# Patient Record
Sex: FEMALE | Race: BLACK OR AFRICAN AMERICAN | Employment: OTHER | URBAN - METROPOLITAN AREA
[De-identification: names, ages, dates, MRNs, and addresses within clinical notes are randomized per-mention and may not be internally consistent; named-entity substitution may affect disease eponyms.]

---

## 2021-04-22 ENCOUNTER — TRANSCRIBE ORDER (OUTPATIENT)
Dept: SCHEDULING | Age: 66
End: 2021-04-22

## 2021-04-22 DIAGNOSIS — Z12.31 ENCOUNTER FOR SCREENING MAMMOGRAM FOR MALIGNANT NEOPLASM OF BREAST: Primary | ICD-10-CM

## 2021-05-07 ENCOUNTER — HOSPITAL ENCOUNTER (OUTPATIENT)
Dept: MAMMOGRAPHY | Age: 66
Discharge: HOME | End: 2021-05-07
Attending: INTERNAL MEDICINE
Payer: COMMERCIAL

## 2021-05-07 DIAGNOSIS — Z12.31 ENCOUNTER FOR SCREENING MAMMOGRAM FOR MALIGNANT NEOPLASM OF BREAST: ICD-10-CM

## 2021-05-07 PROCEDURE — 77067 SCR MAMMO BI INCL CAD: CPT

## 2021-05-18 ENCOUNTER — TRANSCRIBE ORDER (OUTPATIENT)
Dept: SCHEDULING | Age: 66
End: 2021-05-18

## 2021-05-18 DIAGNOSIS — R92.8 ABNORMAL MAMMOGRAM: Primary | ICD-10-CM

## 2021-06-14 ENCOUNTER — HOSPITAL ENCOUNTER (OUTPATIENT)
Dept: MAMMOGRAPHY | Age: 66
Discharge: HOME | End: 2021-06-14
Attending: INTERNAL MEDICINE
Payer: COMMERCIAL

## 2021-06-14 ENCOUNTER — HOSPITAL ENCOUNTER (OUTPATIENT)
Dept: ULTRASOUND IMAGING | Age: 66
Discharge: HOME | End: 2021-06-14
Attending: INTERNAL MEDICINE
Payer: COMMERCIAL

## 2021-06-14 DIAGNOSIS — R92.8 ABNORMAL MAMMOGRAM: ICD-10-CM

## 2021-06-14 PROCEDURE — 76641 ULTRASOUND BREAST COMPLETE: CPT

## 2022-01-20 ENCOUNTER — HOSPITAL ENCOUNTER (EMERGENCY)
Age: 67
Discharge: HOME | End: 2022-01-20
Attending: EMERGENCY MEDICINE | Admitting: EMERGENCY MEDICINE
Payer: MEDICARE

## 2022-01-20 ENCOUNTER — APPOINTMENT (OUTPATIENT)
Dept: ULTRASOUND IMAGING | Age: 67
End: 2022-01-20
Attending: EMERGENCY MEDICINE
Payer: MEDICARE

## 2022-01-20 VITALS
SYSTOLIC BLOOD PRESSURE: 138 MMHG | HEIGHT: 66 IN | RESPIRATION RATE: 18 BRPM | WEIGHT: 200 LBS | BODY MASS INDEX: 32.14 KG/M2 | HEART RATE: 102 BPM | OXYGEN SATURATION: 98 % | DIASTOLIC BLOOD PRESSURE: 77 MMHG | TEMPERATURE: 98.2 F

## 2022-01-20 DIAGNOSIS — K80.50 BILIARY COLIC: Primary | ICD-10-CM

## 2022-01-20 LAB
ALBUMIN SERPL-MCNC: 3.9 G/DL (ref 3.5–5)
ALBUMIN/GLOB SERPL: 0.7 {RATIO} (ref 1.1–2.2)
ALP SERPL-CCNC: 81 U/L (ref 45–117)
ALT SERPL-CCNC: 18 U/L (ref 12–78)
ANION GAP SERPL CALC-SCNC: 12 MMOL/L (ref 5–15)
AST SERPL W P-5'-P-CCNC: 20 U/L (ref 15–37)
BASOPHILS # BLD: 0 K/UL (ref 0–0.2)
BASOPHILS NFR BLD: 0 % (ref 0–2.5)
BILIRUB SERPL-MCNC: 0.4 MG/DL (ref 0.2–1)
BUN SERPL-MCNC: 5 MG/DL (ref 6–20)
BUN/CREAT SERPL: 9 (ref 12–20)
CA-I BLD-MCNC: 8.8 MG/DL (ref 8.5–10.1)
CHLORIDE SERPL-SCNC: 101 MMOL/L (ref 97–108)
CO2 SERPL-SCNC: 27 MMOL/L (ref 21–32)
CREAT SERPL-MCNC: 0.57 MG/DL (ref 0.55–1.02)
EOSINOPHIL # BLD: 0 K/UL (ref 0–0.7)
EOSINOPHIL NFR BLD: 0 % (ref 0.9–2.9)
ERYTHROCYTE [DISTWIDTH] IN BLOOD BY AUTOMATED COUNT: 13.9 % (ref 11.5–14.5)
GLOBULIN SER CALC-MCNC: 5.3 G/DL (ref 2–4)
GLUCOSE SERPL-MCNC: 144 MG/DL (ref 65–100)
HCT VFR BLD AUTO: 37.8 % (ref 36–46)
HGB BLD-MCNC: 13.3 G/DL (ref 13.5–17.5)
LIPASE SERPL-CCNC: 28 U/L (ref 73–393)
LYMPHOCYTES # BLD: 0.4 K/UL (ref 1–4.8)
LYMPHOCYTES NFR BLD: 3 % (ref 20.5–51.1)
MCH RBC QN AUTO: 31.6 PG (ref 31–34)
MCHC RBC AUTO-ENTMCNC: 35.3 G/DL (ref 31–36)
MCV RBC AUTO: 89.6 FL (ref 80–100)
MONOCYTES # BLD: 0.8 K/UL (ref 0.2–2.4)
MONOCYTES NFR BLD: 7 % (ref 1.7–9.3)
NEUTS SEG # BLD: 10.7 K/UL (ref 1.8–7.7)
NEUTS SEG NFR BLD: 90 % (ref 42–75)
NRBC # BLD: 0 K/UL
NRBC BLD-RTO: 0 PER 100 WBC
PLATELET # BLD AUTO: 228 K/UL (ref 150–400)
PMV BLD AUTO: 8.4 FL (ref 6.5–11.5)
POTASSIUM SERPL-SCNC: 4 MMOL/L (ref 3.5–5.1)
PROT SERPL-MCNC: 9.2 G/DL (ref 6.4–8.2)
RBC # BLD AUTO: 4.22 M/UL (ref 4.5–5.9)
SODIUM SERPL-SCNC: 140 MMOL/L (ref 136–145)
WBC # BLD AUTO: 11.9 K/UL (ref 4.4–11.3)

## 2022-01-20 PROCEDURE — 80053 COMPREHEN METABOLIC PANEL: CPT

## 2022-01-20 PROCEDURE — 36415 COLL VENOUS BLD VENIPUNCTURE: CPT

## 2022-01-20 PROCEDURE — 99283 EMERGENCY DEPT VISIT LOW MDM: CPT

## 2022-01-20 PROCEDURE — 74011250637 HC RX REV CODE- 250/637: Performed by: EMERGENCY MEDICINE

## 2022-01-20 PROCEDURE — 96374 THER/PROPH/DIAG INJ IV PUSH: CPT

## 2022-01-20 PROCEDURE — 74011250636 HC RX REV CODE- 250/636: Performed by: EMERGENCY MEDICINE

## 2022-01-20 PROCEDURE — 76705 ECHO EXAM OF ABDOMEN: CPT

## 2022-01-20 PROCEDURE — 83690 ASSAY OF LIPASE: CPT

## 2022-01-20 PROCEDURE — 96375 TX/PRO/DX INJ NEW DRUG ADDON: CPT

## 2022-01-20 PROCEDURE — 96376 TX/PRO/DX INJ SAME DRUG ADON: CPT

## 2022-01-20 PROCEDURE — 85025 COMPLETE CBC W/AUTO DIFF WBC: CPT

## 2022-01-20 RX ORDER — MORPHINE SULFATE 4 MG/ML
4 INJECTION INTRAVENOUS
Status: COMPLETED | OUTPATIENT
Start: 2022-01-20 | End: 2022-01-20

## 2022-01-20 RX ORDER — OXYCODONE AND ACETAMINOPHEN 5; 325 MG/1; MG/1
1 TABLET ORAL
Qty: 12 TABLET | Refills: 0 | Status: SHIPPED | OUTPATIENT
Start: 2022-01-20 | End: 2022-01-23

## 2022-01-20 RX ORDER — ONDANSETRON 4 MG/1
4 TABLET, ORALLY DISINTEGRATING ORAL
Status: COMPLETED | OUTPATIENT
Start: 2022-01-20 | End: 2022-01-20

## 2022-01-20 RX ORDER — ONDANSETRON 2 MG/ML
4 INJECTION INTRAMUSCULAR; INTRAVENOUS
Status: COMPLETED | OUTPATIENT
Start: 2022-01-20 | End: 2022-01-20

## 2022-01-20 RX ADMIN — ONDANSETRON 4 MG: 2 INJECTION INTRAMUSCULAR; INTRAVENOUS at 16:00

## 2022-01-20 RX ADMIN — ONDANSETRON 4 MG: 4 TABLET, ORALLY DISINTEGRATING ORAL at 17:29

## 2022-01-20 RX ADMIN — MORPHINE SULFATE 4 MG: 4 INJECTION, SOLUTION INTRAMUSCULAR; INTRAVENOUS at 16:00

## 2022-01-20 RX ADMIN — SODIUM CHLORIDE 1000 ML: 9 INJECTION, SOLUTION INTRAVENOUS at 15:59

## 2022-01-20 RX ADMIN — MORPHINE SULFATE 4 MG: 4 INJECTION, SOLUTION INTRAMUSCULAR; INTRAVENOUS at 17:29

## 2022-01-20 NOTE — ED PROVIDER NOTES
HPI   Patient reports continuous and worsening right upper quadrant abdominal pain, nausea, and anorexia for the past 12 hours or so. Is described as a sharp and stabbing and is at times severe. She denies vomiting, GI bleeding,  complaints. Nothing has alleviated the pain and is worsened by palpation and certain positions. No past medical history on file. Past Surgical History:   Procedure Laterality Date    HX HYSTERECTOMY      HX OOPHORECTOMY           No family history on file. Social History     Socioeconomic History    Marital status:      Spouse name: Not on file    Number of children: Not on file    Years of education: Not on file    Highest education level: Not on file   Occupational History    Not on file   Tobacco Use    Smoking status: Not on file    Smokeless tobacco: Not on file   Substance and Sexual Activity    Alcohol use: Not on file    Drug use: Not on file    Sexual activity: Not on file   Other Topics Concern    Not on file   Social History Narrative    Not on file     Social Determinants of Health     Financial Resource Strain:     Difficulty of Paying Living Expenses: Not on file   Food Insecurity:     Worried About Running Out of Food in the Last Year: Not on file    David of Food in the Last Year: Not on file   Transportation Needs:     Lack of Transportation (Medical): Not on file    Lack of Transportation (Non-Medical):  Not on file   Physical Activity:     Days of Exercise per Week: Not on file    Minutes of Exercise per Session: Not on file   Stress:     Feeling of Stress : Not on file   Social Connections:     Frequency of Communication with Friends and Family: Not on file    Frequency of Social Gatherings with Friends and Family: Not on file    Attends Jain Services: Not on file    Active Member of Clubs or Organizations: Not on file    Attends Club or Organization Meetings: Not on file    Marital Status: Not on file   Intimate Partner Violence:     Fear of Current or Ex-Partner: Not on file    Emotionally Abused: Not on file    Physically Abused: Not on file    Sexually Abused: Not on file   Housing Stability:     Unable to Pay for Housing in the Last Year: Not on file    Number of [place] in the Last Year: Not on file    Unstable Housing in the Last Year: Not on file         ALLERGIES: Patient has no known allergies. Review of Systems   Constitutional: Negative. HENT: Negative. Eyes: Negative. Respiratory: Negative. Cardiovascular: Negative. Gastrointestinal: Positive for abdominal pain and nausea. Endocrine: Negative. Genitourinary: Negative. Musculoskeletal: Negative. Allergic/Immunologic: Negative. Neurological: Negative. Hematological: Negative. Psychiatric/Behavioral: Negative. All other systems reviewed and are negative. Vitals:    01/20/22 1410   BP: 138/77   Pulse: (!) 102   Resp: 18   Temp: 98.2 °F (36.8 °C)   SpO2: 98%   Weight: 90.7 kg (200 lb)   Height: 5' 6\" (1.676 m)            Physical Exam  Vitals and nursing note reviewed. Constitutional:       General: She is not in acute distress. Appearance: Normal appearance. She is ill-appearing. She is not toxic-appearing or diaphoretic. Comments: Very uncomfortable appearing   HENT:      Head: Normocephalic and atraumatic. Nose: Nose normal.      Mouth/Throat:      Mouth: Mucous membranes are moist.      Pharynx: Oropharynx is clear. Eyes:      Extraocular Movements: Extraocular movements intact. Conjunctiva/sclera: Conjunctivae normal.      Pupils: Pupils are equal, round, and reactive to light. Cardiovascular:      Rate and Rhythm: Normal rate and regular rhythm. Pulses: Normal pulses. Heart sounds: Normal heart sounds. No murmur heard. No friction rub. No gallop. Pulmonary:      Effort: Pulmonary effort is normal. No respiratory distress. Abdominal:      General: Abdomen is flat. There is no distension. Palpations: There is no mass. Tenderness: There is abdominal tenderness. There is guarding. There is no right CVA tenderness, left CVA tenderness or rebound. Hernia: No hernia is present. Comments: Firm and very tender in the right upper quadrant with guarding. Positive Richard sign. Musculoskeletal:         General: No swelling, tenderness, deformity or signs of injury. Normal range of motion. Cervical back: Normal range of motion. No rigidity. Right lower leg: No edema. Left lower leg: No edema. Lymphadenopathy:      Cervical: No cervical adenopathy. Skin:     General: Skin is warm and dry. Coloration: Skin is not jaundiced or pale. Findings: No bruising, erythema, lesion or rash. Neurological:      General: No focal deficit present. Mental Status: She is alert and oriented to person, place, and time. Mental status is at baseline. Cranial Nerves: No cranial nerve deficit. Sensory: No sensory deficit. Motor: No weakness. Coordination: Coordination normal.      Gait: Gait normal.   Psychiatric:         Mood and Affect: Mood normal.         Behavior: Behavior normal.          MDM     Is concerning for acute cholecystitis. Differential includes cholangitis, pancreatitis, common bile duct stone, malignancy. Will check labs and ultrasound. Provide pain relief and IV fluids. Admit versus transfer. Evaluation reveals likely acute cholecystitis. Reexamined patient remains very tender with guarding. Discussed with surgeon Dr. Cathie Merchant, who recommends transfer to [facility] and will see her there. Addendum: Patient reports that she wishes to follow-up outpatient with surgery. Given that the ultrasound was not definitive, patient is afebrile without much leukocytosis this is very reasonable.   Procedures

## 2022-04-29 ENCOUNTER — TRANSCRIBE ORDER (OUTPATIENT)
Dept: SCHEDULING | Age: 67
End: 2022-04-29

## 2022-04-29 DIAGNOSIS — Z12.31 SCREENING MAMMOGRAM FOR HIGH-RISK PATIENT: Primary | ICD-10-CM

## 2022-05-10 ENCOUNTER — HOSPITAL ENCOUNTER (OUTPATIENT)
Dept: MAMMOGRAPHY | Age: 67
Discharge: HOME | End: 2022-05-10
Attending: INTERNAL MEDICINE
Payer: MEDICARE

## 2022-05-10 DIAGNOSIS — Z12.31 SCREENING MAMMOGRAM FOR HIGH-RISK PATIENT: ICD-10-CM

## 2022-05-10 PROCEDURE — 77063 BREAST TOMOSYNTHESIS BI: CPT

## 2022-07-19 ENCOUNTER — HOSPITAL ENCOUNTER (OUTPATIENT)
Dept: RADIATION THERAPY | Age: 67
Discharge: HOME | End: 2022-07-19

## 2022-07-20 ENCOUNTER — HOSPITAL ENCOUNTER (OUTPATIENT)
Dept: RADIATION THERAPY | Age: 67
Discharge: HOME | End: 2022-07-20
Attending: INTERNAL MEDICINE
Payer: MEDICARE

## 2022-07-20 ENCOUNTER — HOSPITAL ENCOUNTER (OUTPATIENT)
Dept: INTERVENTIONAL RADIOLOGY/VASCULAR | Age: 67
Discharge: HOME | End: 2022-07-20
Attending: INTERNAL MEDICINE
Payer: MEDICARE

## 2022-07-20 VITALS
RESPIRATION RATE: 18 BRPM | SYSTOLIC BLOOD PRESSURE: 129 MMHG | HEIGHT: 66 IN | TEMPERATURE: 97.9 F | BODY MASS INDEX: 30.7 KG/M2 | OXYGEN SATURATION: 100 % | DIASTOLIC BLOOD PRESSURE: 99 MMHG | HEART RATE: 70 BPM | WEIGHT: 191 LBS

## 2022-07-20 DIAGNOSIS — C15.9 ESOPHAGEAL CANCER (HCC): ICD-10-CM

## 2022-07-20 DIAGNOSIS — K80.20 BILIARY CALCULUS: ICD-10-CM

## 2022-07-20 LAB
ANION GAP SERPL CALC-SCNC: 7 MMOL/L (ref 5–15)
BASOPHILS # BLD: 0 K/UL (ref 0–0.1)
BASOPHILS NFR BLD: 0 % (ref 0–1)
BUN SERPL-MCNC: 12 MG/DL (ref 6–20)
BUN/CREAT SERPL: 27 (ref 12–20)
CA-I BLD-MCNC: 8.9 MG/DL (ref 8.5–10.1)
CHLORIDE SERPL-SCNC: 108 MMOL/L (ref 97–108)
CO2 SERPL-SCNC: 24 MMOL/L (ref 21–32)
CREAT SERPL-MCNC: 0.45 MG/DL (ref 0.55–1.02)
DIFFERENTIAL METHOD BLD: ABNORMAL
EOSINOPHIL # BLD: 0 K/UL (ref 0–0.4)
EOSINOPHIL NFR BLD: 1 % (ref 0–7)
ERYTHROCYTE [DISTWIDTH] IN BLOOD BY AUTOMATED COUNT: 13.7 % (ref 11.5–14.5)
GLUCOSE SERPL-MCNC: 95 MG/DL (ref 65–100)
HCT VFR BLD AUTO: 30.1 % (ref 35–47)
HGB BLD-MCNC: 10.1 G/DL (ref 11.5–16)
IMM GRANULOCYTES # BLD AUTO: 0 K/UL (ref 0–0.04)
IMM GRANULOCYTES NFR BLD AUTO: 0 % (ref 0–0.5)
INR PPP: 1 (ref 0.9–1.1)
LYMPHOCYTES # BLD: 1.7 K/UL (ref 0.8–3.5)
LYMPHOCYTES NFR BLD: 38 % (ref 12–49)
MCH RBC QN AUTO: 30.5 PG (ref 26–34)
MCHC RBC AUTO-ENTMCNC: 33.6 G/DL (ref 30–36.5)
MCV RBC AUTO: 90.9 FL (ref 80–99)
MONOCYTES # BLD: 0.5 K/UL (ref 0–1)
MONOCYTES NFR BLD: 11 % (ref 5–13)
NEUTS SEG # BLD: 2.2 K/UL (ref 1.8–8)
NEUTS SEG NFR BLD: 50 % (ref 32–75)
NRBC # BLD: 0 K/UL (ref 0–0.01)
NRBC BLD-RTO: 0 PER 100 WBC
PLATELET # BLD AUTO: 235 K/UL (ref 150–400)
PMV BLD AUTO: 10.2 FL (ref 8.9–12.9)
POTASSIUM SERPL-SCNC: 3.7 MMOL/L (ref 3.5–5.1)
PROTHROMBIN TIME: 12.9 SEC (ref 11.9–14.6)
RBC # BLD AUTO: 3.31 M/UL (ref 3.8–5.2)
SODIUM SERPL-SCNC: 139 MMOL/L (ref 136–145)
WBC # BLD AUTO: 4.4 K/UL (ref 3.6–11)

## 2022-07-20 PROCEDURE — 99152 MOD SED SAME PHYS/QHP 5/>YRS: CPT

## 2022-07-20 PROCEDURE — 36415 COLL VENOUS BLD VENIPUNCTURE: CPT

## 2022-07-20 PROCEDURE — 85025 COMPLETE CBC W/AUTO DIFF WBC: CPT

## 2022-07-20 PROCEDURE — 76937 US GUIDE VASCULAR ACCESS: CPT

## 2022-07-20 PROCEDURE — C1788 PORT, INDWELLING, IMP: HCPCS

## 2022-07-20 PROCEDURE — 77334 RADIATION TREATMENT AID(S): CPT

## 2022-07-20 PROCEDURE — 80048 BASIC METABOLIC PNL TOTAL CA: CPT

## 2022-07-20 PROCEDURE — 77001 FLUOROGUIDE FOR VEIN DEVICE: CPT

## 2022-07-20 PROCEDURE — 99153 MOD SED SAME PHYS/QHP EA: CPT

## 2022-07-20 PROCEDURE — 77290 THER RAD SIMULAJ FIELD CPLX: CPT

## 2022-07-20 PROCEDURE — 74011250636 HC RX REV CODE- 250/636: Performed by: STUDENT IN AN ORGANIZED HEALTH CARE EDUCATION/TRAINING PROGRAM

## 2022-07-20 PROCEDURE — 85610 PROTHROMBIN TIME: CPT

## 2022-07-20 PROCEDURE — 36561 INSERT TUNNELED CV CATH: CPT

## 2022-07-20 RX ORDER — LISINOPRIL 5 MG/1
10 TABLET ORAL DAILY
Status: ON HOLD | COMMUNITY

## 2022-07-20 RX ORDER — MIDAZOLAM HYDROCHLORIDE 1 MG/ML
.5-1 INJECTION, SOLUTION INTRAMUSCULAR; INTRAVENOUS
Status: DISCONTINUED | OUTPATIENT
Start: 2022-07-20 | End: 2022-07-29 | Stop reason: HOSPADM

## 2022-07-20 RX ORDER — FENTANYL CITRATE 50 UG/ML
12.5-5 INJECTION, SOLUTION INTRAMUSCULAR; INTRAVENOUS
Status: DISCONTINUED | OUTPATIENT
Start: 2022-07-20 | End: 2022-07-29 | Stop reason: HOSPADM

## 2022-07-20 RX ORDER — CEFAZOLIN SODIUM 1 G/3ML
2 INJECTION, POWDER, FOR SOLUTION INTRAMUSCULAR; INTRAVENOUS ONCE
Status: COMPLETED | OUTPATIENT
Start: 2022-07-20 | End: 2022-07-20

## 2022-07-20 RX ORDER — ATORVASTATIN CALCIUM 20 MG/1
TABLET, FILM COATED ORAL DAILY
Status: ON HOLD | COMMUNITY

## 2022-07-20 RX ORDER — AMLODIPINE BESYLATE 5 MG/1
5 TABLET ORAL DAILY
Status: ON HOLD | COMMUNITY

## 2022-07-20 RX ADMIN — FENTANYL CITRATE 50 MCG: 0.05 INJECTION, SOLUTION INTRAMUSCULAR; INTRAVENOUS at 09:26

## 2022-07-20 RX ADMIN — MIDAZOLAM 1 MG: 1 INJECTION INTRAMUSCULAR; INTRAVENOUS at 09:34

## 2022-07-20 RX ADMIN — MIDAZOLAM 1 MG: 1 INJECTION INTRAMUSCULAR; INTRAVENOUS at 09:26

## 2022-07-20 RX ADMIN — CEFAZOLIN SODIUM 2 G: 1 INJECTION, POWDER, FOR SOLUTION INTRAMUSCULAR; INTRAVENOUS at 09:20

## 2022-07-20 RX ADMIN — FENTANYL CITRATE 50 MCG: 0.05 INJECTION, SOLUTION INTRAMUSCULAR; INTRAVENOUS at 09:34

## 2022-07-20 NOTE — H&P
Radiology History and Physical    Patient: Toya Betts 73 y.o. female       Chief Complaint: No chief complaint on file. History of Present Illness: 73year old woman presents for port placement, for chemotherapy access. History:    Past Medical History:   Diagnosis Date    Cancer ([facility] [address]      Family History   Problem Relation Age of Onset    Breast Cancer Mother      Social History     Socioeconomic History    Marital status:      Spouse name: Not on file    Number of children: Not on file    Years of education: Not on file    Highest education level: Not on file   Occupational History    Not on file   Tobacco Use    Smoking status: Never    Smokeless tobacco: Never   Vaping Use    Vaping Use: Never used   Substance and Sexual Activity    Alcohol use: Not Currently    Drug use: Never    Sexual activity: Not on file   Other Topics Concern    Not on file   Social History Narrative    Not on file     Social Determinants of Health     Financial Resource Strain: Not on file   Food Insecurity: Not on file   Transportation Needs: Not on file   Physical Activity: Not on file   Stress: Not on file   Social Connections: Not on file   Intimate Partner Violence: Not on file   Housing Stability: Not on file       Allergies: No Known Allergies    Current Medications:  Current Outpatient Medications   Medication Sig    lisinopriL (PRINIVIL, ZESTRIL) 5 mg tablet Take 10 mg by mouth in the morning. amLODIPine (NORVASC) 5 mg tablet Take 5 mg by mouth in the morning. atorvastatin (LIPITOR) 20 mg tablet Take  by mouth daily. Current Facility-Administered Medications   Medication Dose Route Frequency    fentaNYL citrate (PF) injection 12.5-50 mcg  12.5-50 mcg IntraVENous Multiple    midazolam (VERSED) injection 0.5-1 mg  0.5-1 mg IntraVENous Multiple    ceFAZolin (ANCEF) injection 2 g  2 g IntraVENous ONCE        Physical Exam:  Blood pressure 128/70, pulse 64, temperature 98.2 °F (36.8 °C), resp.  rate 18, height 5' 6\" (1.676 m), weight 86.6 kg (191 lb), SpO2 100 %. GENERAL: alert, cooperative, no distress, appears stated age,   LUNG: Nonlabored respiration on room air  HEART: regular rate and rhythm    Alerts:      Laboratory:      Recent Labs     07/20/22  0819   HGB 10.1*   HCT 30.1*   WBC 4.4      INR 1.0   BUN 12   CREA 0.45*   K 3.7         Plan of Care/Planned Procedure:  Risks, benefits, and alternatives reviewed with patient and she agrees to proceed with the procedure. Port placement    Deemed appropriate for moderate sedation with versed and fentanyl.     Ingrid Washington MD  Interventional Radiology  [facility]  9:28 AM, 7/20/2022

## 2022-07-20 NOTE — PROCEDURES
Interventional Radiology  Procedure Note        7/20/2022 9:47 AM    Patient: John Wells     Informed consent obtained    Diagnosis: Cancer, need for IV access    Procedure(s): Port placement    Specimens removed:  none    Complications: None    Primary Physician: Cyrus Rudolph MD    Recommendations: N/A    Discharge Disposition: Stable; discharge to home    Full dictated report to follow    Cyrus Rudolph MD  Interventional Radiology  [facility].  9:47 AM, 7/20/2022

## 2022-07-20 NOTE — PROGRESS NOTES
Upon arrival to sds unit, Patient states okay to review and give discharge instructions to  Anup Florence/ daughter.

## 2022-07-20 NOTE — PROGRESS NOTES
Patient's two port incision sites are clean, dry and intact with skin glue.  Assessed at 1001am, and no new finding at reassessment at 1030 am.

## 2022-07-20 NOTE — PROGRESS NOTES
Patient and Daughter, Maryam Matos, given discharge education verballly and gave back verbal understanding. Maryam Matos, daughter signed discharge page. Patient taken to ride's car via wheelchair.

## 2022-07-22 ENCOUNTER — HOSPITAL ENCOUNTER (OUTPATIENT)
Dept: RADIATION THERAPY | Age: 67
Discharge: HOME | End: 2022-07-22
Payer: MEDICARE

## 2022-07-22 PROCEDURE — 77338 DESIGN MLC DEVICE FOR IMRT: CPT

## 2022-07-22 PROCEDURE — 77300 RADIATION THERAPY DOSE PLAN: CPT

## 2022-07-22 PROCEDURE — 77301 RADIOTHERAPY DOSE PLAN IMRT: CPT

## 2022-07-22 PROCEDURE — 77470 SPECIAL RADIATION TREATMENT: CPT

## 2022-07-28 ENCOUNTER — HOSPITAL ENCOUNTER (OUTPATIENT)
Dept: RADIATION THERAPY | Age: 67
Discharge: HOME | End: 2022-07-28
Payer: MEDICARE

## 2022-07-28 PROCEDURE — 77386 HC IMRT TRMT DLVR COMPL: CPT

## 2022-07-29 ENCOUNTER — HOSPITAL ENCOUNTER (OUTPATIENT)
Dept: RADIATION THERAPY | Age: 67
Discharge: HOME | End: 2022-07-29
Payer: MEDICARE

## 2022-07-29 PROCEDURE — 77386 HC IMRT TRMT DLVR COMPL: CPT

## 2022-08-01 ENCOUNTER — HOSPITAL ENCOUNTER (OUTPATIENT)
Dept: RADIATION THERAPY | Age: 67
Discharge: HOME | End: 2022-08-01
Payer: MEDICARE

## 2022-08-01 PROCEDURE — 77386 HC IMRT TRMT DLVR COMPL: CPT

## 2022-08-02 ENCOUNTER — HOSPITAL ENCOUNTER (OUTPATIENT)
Dept: RADIATION THERAPY | Age: 67
Discharge: HOME | End: 2022-08-02
Payer: MEDICARE

## 2022-08-02 PROCEDURE — 77386 HC IMRT TRMT DLVR COMPL: CPT

## 2022-08-03 ENCOUNTER — HOSPITAL ENCOUNTER (OUTPATIENT)
Dept: RADIATION THERAPY | Age: 67
Discharge: HOME | End: 2022-08-03
Payer: MEDICARE

## 2022-08-03 PROCEDURE — 77386 HC IMRT TRMT DLVR COMPL: CPT

## 2022-08-03 PROCEDURE — 77336 RADIATION PHYSICS CONSULT: CPT

## 2022-08-04 ENCOUNTER — HOSPITAL ENCOUNTER (OUTPATIENT)
Dept: RADIATION THERAPY | Age: 67
Discharge: HOME | End: 2022-08-04
Payer: MEDICARE

## 2022-08-04 PROCEDURE — 77386 HC IMRT TRMT DLVR COMPL: CPT

## 2022-08-05 ENCOUNTER — HOSPITAL ENCOUNTER (OUTPATIENT)
Dept: RADIATION THERAPY | Age: 67
Discharge: HOME | End: 2022-08-05
Payer: MEDICARE

## 2022-08-05 PROCEDURE — 77386 HC IMRT TRMT DLVR COMPL: CPT

## 2022-08-08 ENCOUNTER — HOSPITAL ENCOUNTER (OUTPATIENT)
Dept: RADIATION THERAPY | Age: 67
Discharge: HOME | End: 2022-08-08
Payer: MEDICARE

## 2022-08-08 PROCEDURE — 77386 HC IMRT TRMT DLVR COMPL: CPT

## 2022-08-09 ENCOUNTER — HOSPITAL ENCOUNTER (OUTPATIENT)
Dept: RADIATION THERAPY | Age: 67
Discharge: HOME | End: 2022-08-09
Payer: MEDICARE

## 2022-08-09 PROCEDURE — 77386 HC IMRT TRMT DLVR COMPL: CPT

## 2022-08-10 ENCOUNTER — HOSPITAL ENCOUNTER (OUTPATIENT)
Dept: RADIATION THERAPY | Age: 67
Discharge: HOME | End: 2022-08-10
Payer: MEDICARE

## 2022-08-10 PROCEDURE — 77336 RADIATION PHYSICS CONSULT: CPT

## 2022-08-10 PROCEDURE — 77386 HC IMRT TRMT DLVR COMPL: CPT

## 2022-08-11 ENCOUNTER — HOSPITAL ENCOUNTER (OUTPATIENT)
Dept: RADIATION THERAPY | Age: 67
Discharge: HOME | End: 2022-08-11
Payer: MEDICARE

## 2022-08-11 PROCEDURE — 77386 HC IMRT TRMT DLVR COMPL: CPT

## 2022-08-12 ENCOUNTER — HOSPITAL ENCOUNTER (OUTPATIENT)
Dept: RADIATION THERAPY | Age: 67
Discharge: HOME | End: 2022-08-12
Payer: MEDICARE

## 2022-08-12 PROCEDURE — 77386 HC IMRT TRMT DLVR COMPL: CPT

## 2022-08-15 ENCOUNTER — HOSPITAL ENCOUNTER (OUTPATIENT)
Dept: RADIATION THERAPY | Age: 67
Discharge: HOME | End: 2022-08-15
Payer: MEDICARE

## 2022-08-15 PROCEDURE — 77386 HC IMRT TRMT DLVR COMPL: CPT

## 2022-08-16 ENCOUNTER — HOSPITAL ENCOUNTER (OUTPATIENT)
Dept: RADIATION THERAPY | Age: 67
Discharge: HOME | End: 2022-08-16
Payer: MEDICARE

## 2022-08-16 PROCEDURE — 77386 HC IMRT TRMT DLVR COMPL: CPT

## 2022-08-17 ENCOUNTER — HOSPITAL ENCOUNTER (OUTPATIENT)
Dept: RADIATION THERAPY | Age: 67
Discharge: HOME | End: 2022-08-17
Payer: MEDICARE

## 2022-08-17 PROCEDURE — 77336 RADIATION PHYSICS CONSULT: CPT

## 2022-08-17 PROCEDURE — 77386 HC IMRT TRMT DLVR COMPL: CPT

## 2022-08-18 ENCOUNTER — HOSPITAL ENCOUNTER (OUTPATIENT)
Dept: RADIATION THERAPY | Age: 67
Discharge: HOME | End: 2022-08-18
Payer: MEDICARE

## 2022-08-18 PROCEDURE — 77386 HC IMRT TRMT DLVR COMPL: CPT

## 2022-08-19 ENCOUNTER — HOSPITAL ENCOUNTER (OUTPATIENT)
Dept: RADIATION THERAPY | Age: 67
Discharge: HOME | End: 2022-08-19
Payer: MEDICARE

## 2022-08-19 PROCEDURE — 77386 HC IMRT TRMT DLVR COMPL: CPT

## 2022-08-22 ENCOUNTER — HOSPITAL ENCOUNTER (OUTPATIENT)
Dept: RADIATION THERAPY | Age: 67
Discharge: HOME | End: 2022-08-22
Payer: MEDICARE

## 2022-08-22 PROCEDURE — 77386 HC IMRT TRMT DLVR COMPL: CPT

## 2022-08-23 ENCOUNTER — HOSPITAL ENCOUNTER (OUTPATIENT)
Dept: RADIATION THERAPY | Age: 67
Discharge: HOME | End: 2022-08-23
Payer: MEDICARE

## 2022-08-23 PROCEDURE — 77386 HC IMRT TRMT DLVR COMPL: CPT

## 2022-08-24 ENCOUNTER — HOSPITAL ENCOUNTER (OUTPATIENT)
Dept: RADIATION THERAPY | Age: 67
Discharge: HOME | End: 2022-08-24
Payer: MEDICARE

## 2022-08-24 PROCEDURE — 77336 RADIATION PHYSICS CONSULT: CPT

## 2022-08-24 PROCEDURE — 77386 HC IMRT TRMT DLVR COMPL: CPT

## 2022-08-25 ENCOUNTER — HOSPITAL ENCOUNTER (OUTPATIENT)
Dept: RADIATION THERAPY | Age: 67
Discharge: HOME | End: 2022-08-25
Payer: MEDICARE

## 2022-08-25 PROCEDURE — 77386 HC IMRT TRMT DLVR COMPL: CPT

## 2022-08-26 ENCOUNTER — HOSPITAL ENCOUNTER (OUTPATIENT)
Dept: RADIATION THERAPY | Age: 67
Discharge: HOME | End: 2022-08-26
Payer: MEDICARE

## 2022-08-26 PROCEDURE — 77386 HC IMRT TRMT DLVR COMPL: CPT

## 2022-08-29 ENCOUNTER — HOSPITAL ENCOUNTER (OUTPATIENT)
Dept: RADIATION THERAPY | Age: 67
Discharge: HOME | End: 2022-08-29
Payer: MEDICARE

## 2022-08-30 ENCOUNTER — HOSPITAL ENCOUNTER (OUTPATIENT)
Dept: RADIATION THERAPY | Age: 67
Discharge: HOME | End: 2022-08-30
Payer: MEDICARE

## 2022-08-30 PROCEDURE — 77338 DESIGN MLC DEVICE FOR IMRT: CPT

## 2022-08-30 PROCEDURE — 77300 RADIATION THERAPY DOSE PLAN: CPT

## 2022-09-02 ENCOUNTER — HOSPITAL ENCOUNTER (OUTPATIENT)
Dept: RADIATION THERAPY | Age: 67
Discharge: HOME | End: 2022-09-02
Payer: MEDICARE

## 2022-09-06 ENCOUNTER — HOSPITAL ENCOUNTER (OUTPATIENT)
Dept: RADIATION THERAPY | Age: 67
Discharge: HOME | End: 2022-09-06
Payer: MEDICARE

## 2022-09-06 PROCEDURE — 77386 HC IMRT TRMT DLVR COMPL: CPT

## 2022-09-07 ENCOUNTER — HOSPITAL ENCOUNTER (OUTPATIENT)
Dept: RADIATION THERAPY | Age: 67
Discharge: HOME | End: 2022-09-07
Payer: MEDICARE

## 2022-09-07 PROCEDURE — 77386 HC IMRT TRMT DLVR COMPL: CPT

## 2022-09-08 ENCOUNTER — HOSPITAL ENCOUNTER (OUTPATIENT)
Dept: RADIATION THERAPY | Age: 67
Discharge: HOME | End: 2022-09-08
Payer: MEDICARE

## 2022-09-08 PROCEDURE — 77386 HC IMRT TRMT DLVR COMPL: CPT

## 2022-09-08 PROCEDURE — 77336 RADIATION PHYSICS CONSULT: CPT

## 2022-09-09 ENCOUNTER — HOSPITAL ENCOUNTER (OUTPATIENT)
Dept: RADIATION THERAPY | Age: 67
Discharge: HOME | End: 2022-09-09
Payer: MEDICARE

## 2022-09-09 PROCEDURE — 77386 HC IMRT TRMT DLVR COMPL: CPT

## 2022-09-12 ENCOUNTER — HOSPITAL ENCOUNTER (OUTPATIENT)
Dept: RADIATION THERAPY | Age: 67
Discharge: HOME | End: 2022-09-12
Payer: MEDICARE

## 2022-09-12 PROCEDURE — 77386 HC IMRT TRMT DLVR COMPL: CPT

## 2022-09-13 ENCOUNTER — HOSPITAL ENCOUNTER (OUTPATIENT)
Dept: RADIATION THERAPY | Age: 67
Discharge: HOME | End: 2022-09-13
Payer: MEDICARE

## 2022-09-13 PROCEDURE — 77386 HC IMRT TRMT DLVR COMPL: CPT

## 2022-09-14 ENCOUNTER — HOSPITAL ENCOUNTER (OUTPATIENT)
Dept: RADIATION THERAPY | Age: 67
Discharge: HOME | End: 2022-09-14
Payer: MEDICARE

## 2022-09-14 PROCEDURE — 77386 HC IMRT TRMT DLVR COMPL: CPT

## 2022-09-15 ENCOUNTER — HOSPITAL ENCOUNTER (OUTPATIENT)
Dept: RADIATION THERAPY | Age: 67
Discharge: HOME | End: 2022-09-15
Payer: MEDICARE

## 2022-09-15 PROCEDURE — 77386 HC IMRT TRMT DLVR COMPL: CPT

## 2022-09-15 PROCEDURE — 77336 RADIATION PHYSICS CONSULT: CPT

## 2022-09-16 ENCOUNTER — APPOINTMENT (OUTPATIENT)
Dept: RADIATION THERAPY | Age: 67
End: 2022-09-16
Payer: MEDICARE

## 2022-10-20 ENCOUNTER — HOSPITAL ENCOUNTER (OUTPATIENT)
Dept: RADIATION THERAPY | Age: 67
Discharge: HOME | End: 2022-10-20

## 2022-11-03 ENCOUNTER — APPOINTMENT (OUTPATIENT)
Dept: GENERAL RADIOLOGY | Age: 67
DRG: 375 | End: 2022-11-03
Attending: EMERGENCY MEDICINE
Payer: MEDICARE

## 2022-11-03 ENCOUNTER — HOSPITAL ENCOUNTER (INPATIENT)
Age: 67
LOS: 8 days | Discharge: HOME | DRG: 375 | End: 2022-11-11
Attending: EMERGENCY MEDICINE | Admitting: INTERNAL MEDICINE
Payer: MEDICARE

## 2022-11-03 DIAGNOSIS — E87.20 METABOLIC ACIDOSIS: ICD-10-CM

## 2022-11-03 DIAGNOSIS — E87.6 HYPOKALEMIA: Primary | ICD-10-CM

## 2022-11-03 LAB
ALBUMIN SERPL-MCNC: 3.6 G/DL (ref 3.5–5)
ALBUMIN/GLOB SERPL: 0.8 {RATIO} (ref 1.1–2.2)
ALP SERPL-CCNC: 99 U/L (ref 45–117)
ALT SERPL-CCNC: 10 U/L (ref 12–78)
ANION GAP SERPL CALC-SCNC: 12 MMOL/L (ref 5–15)
ANION GAP SERPL CALC-SCNC: 18 MMOL/L (ref 5–15)
APAP SERPL-MCNC: <10 UG/ML (ref 10–30)
AST SERPL W P-5'-P-CCNC: 16 U/L (ref 15–37)
ATRIAL RATE: 96 BPM
BASOPHILS # BLD: 0 K/UL (ref 0–0.1)
BASOPHILS NFR BLD: 0 % (ref 0–1)
BILIRUB SERPL-MCNC: 0.4 MG/DL (ref 0.2–1)
BUN SERPL-MCNC: 5 MG/DL (ref 6–20)
BUN SERPL-MCNC: 5 MG/DL (ref 6–20)
BUN/CREAT SERPL: 4 (ref 12–20)
BUN/CREAT SERPL: 6 (ref 12–20)
CA-I BLD-MCNC: 8.9 MG/DL (ref 8.5–10.1)
CA-I BLD-MCNC: 9.9 MG/DL (ref 8.5–10.1)
CALCULATED P AXIS, ECG09: 71 DEGREES
CALCULATED R AXIS, ECG10: 45 DEGREES
CALCULATED T AXIS, ECG11: 49 DEGREES
CHLORIDE SERPL-SCNC: 115 MMOL/L (ref 97–108)
CHLORIDE SERPL-SCNC: 121 MMOL/L (ref 97–108)
CO2 SERPL-SCNC: 12 MMOL/L (ref 21–32)
CO2 SERPL-SCNC: 13 MMOL/L (ref 21–32)
CREAT SERPL-MCNC: 0.88 MG/DL (ref 0.55–1.02)
CREAT SERPL-MCNC: 1.35 MG/DL (ref 0.55–1.02)
DIAGNOSIS, 93000: NORMAL
DIFFERENTIAL METHOD BLD: ABNORMAL
EOSINOPHIL # BLD: 0 K/UL (ref 0–0.4)
EOSINOPHIL NFR BLD: 0 % (ref 0–7)
ERYTHROCYTE [DISTWIDTH] IN BLOOD BY AUTOMATED COUNT: 20 % (ref 11.5–14.5)
ETHANOL SERPL-MCNC: <10 MG/DL
GLOBULIN SER CALC-MCNC: 4.7 G/DL (ref 2–4)
GLUCOSE SERPL-MCNC: 126 MG/DL (ref 65–100)
GLUCOSE SERPL-MCNC: 155 MG/DL (ref 65–100)
HCT VFR BLD AUTO: 33 % (ref 35–47)
HGB BLD-MCNC: 11.8 G/DL (ref 11.5–16)
IMM GRANULOCYTES # BLD AUTO: 0 K/UL (ref 0–0.04)
IMM GRANULOCYTES NFR BLD AUTO: 0 % (ref 0–0.5)
LYMPHOCYTES # BLD: 1 K/UL (ref 0.8–3.5)
LYMPHOCYTES NFR BLD: 12 % (ref 12–49)
MAGNESIUM SERPL-MCNC: 1.9 MG/DL (ref 1.6–2.4)
MCH RBC QN AUTO: 32.9 PG (ref 26–34)
MCHC RBC AUTO-ENTMCNC: 35.8 G/DL (ref 30–36.5)
MCV RBC AUTO: 91.9 FL (ref 80–99)
MONOCYTES # BLD: 0.6 K/UL (ref 0–1)
MONOCYTES NFR BLD: 7 % (ref 5–13)
NEUTS SEG # BLD: 6.4 K/UL (ref 1.8–8)
NEUTS SEG NFR BLD: 81 % (ref 32–75)
NRBC # BLD: 0.02 K/UL (ref 0–0.01)
NRBC BLD-RTO: 0.3 PER 100 WBC
P-R INTERVAL, ECG05: 152 MS
PLATELET # BLD AUTO: 129 K/UL (ref 150–400)
PMV BLD AUTO: 11.1 FL (ref 8.9–12.9)
POTASSIUM SERPL-SCNC: 2 MMOL/L (ref 3.5–5.1)
POTASSIUM SERPL-SCNC: 2.5 MMOL/L (ref 3.5–5.1)
PROT SERPL-MCNC: 8.3 G/DL (ref 6.4–8.2)
Q-T INTERVAL, ECG07: 464 MS
QRS DURATION, ECG06: 86 MS
QTC CALCULATION (BEZET), ECG08: 586 MS
RBC # BLD AUTO: 3.59 M/UL (ref 3.8–5.2)
SALICYLATES SERPL-MCNC: <1.7 MG/DL (ref 2.8–20)
SODIUM SERPL-SCNC: 145 MMOL/L (ref 136–145)
SODIUM SERPL-SCNC: 146 MMOL/L (ref 136–145)
TROPONIN-HIGH SENSITIVITY: 37 NG/L (ref 0–51)
VENTRICULAR RATE, ECG03: 96 BPM
WBC # BLD AUTO: 8 K/UL (ref 3.6–11)

## 2022-11-03 PROCEDURE — 80143 DRUG ASSAY ACETAMINOPHEN: CPT

## 2022-11-03 PROCEDURE — 74011000250 HC RX REV CODE- 250: Performed by: INTERNAL MEDICINE

## 2022-11-03 PROCEDURE — 85025 COMPLETE CBC W/AUTO DIFF WBC: CPT

## 2022-11-03 PROCEDURE — 82077 ASSAY SPEC XCP UR&BREATH IA: CPT

## 2022-11-03 PROCEDURE — 74011250636 HC RX REV CODE- 250/636: Performed by: EMERGENCY MEDICINE

## 2022-11-03 PROCEDURE — 65270000029 HC RM PRIVATE

## 2022-11-03 PROCEDURE — 84484 ASSAY OF TROPONIN QUANT: CPT

## 2022-11-03 PROCEDURE — 71045 X-RAY EXAM CHEST 1 VIEW: CPT

## 2022-11-03 PROCEDURE — 96368 THER/DIAG CONCURRENT INF: CPT

## 2022-11-03 PROCEDURE — 94761 N-INVAS EAR/PLS OXIMETRY MLT: CPT

## 2022-11-03 PROCEDURE — 80179 DRUG ASSAY SALICYLATE: CPT

## 2022-11-03 PROCEDURE — 93005 ELECTROCARDIOGRAM TRACING: CPT

## 2022-11-03 PROCEDURE — 80048 BASIC METABOLIC PNL TOTAL CA: CPT

## 2022-11-03 PROCEDURE — 74011250636 HC RX REV CODE- 250/636: Performed by: INTERNAL MEDICINE

## 2022-11-03 PROCEDURE — 96366 THER/PROPH/DIAG IV INF ADDON: CPT

## 2022-11-03 PROCEDURE — 36415 COLL VENOUS BLD VENIPUNCTURE: CPT

## 2022-11-03 PROCEDURE — 96365 THER/PROPH/DIAG IV INF INIT: CPT

## 2022-11-03 PROCEDURE — 83735 ASSAY OF MAGNESIUM: CPT

## 2022-11-03 PROCEDURE — 74011250637 HC RX REV CODE- 250/637: Performed by: EMERGENCY MEDICINE

## 2022-11-03 PROCEDURE — 99285 EMERGENCY DEPT VISIT HI MDM: CPT

## 2022-11-03 PROCEDURE — 80053 COMPREHEN METABOLIC PANEL: CPT

## 2022-11-03 RX ORDER — ACETAMINOPHEN 325 MG/1
650 TABLET ORAL
Status: DISCONTINUED | OUTPATIENT
Start: 2022-11-03 | End: 2022-11-11 | Stop reason: HOSPADM

## 2022-11-03 RX ORDER — MAGNESIUM SULFATE 1 G/100ML
1 INJECTION INTRAVENOUS
Status: COMPLETED | OUTPATIENT
Start: 2022-11-03 | End: 2022-11-03

## 2022-11-03 RX ORDER — ATORVASTATIN CALCIUM 20 MG/1
20 TABLET, FILM COATED ORAL DAILY
Status: DISCONTINUED | OUTPATIENT
Start: 2022-11-04 | End: 2022-11-11 | Stop reason: HOSPADM

## 2022-11-03 RX ORDER — ACETAMINOPHEN 650 MG/1
650 SUPPOSITORY RECTAL
Status: DISCONTINUED | OUTPATIENT
Start: 2022-11-03 | End: 2022-11-11 | Stop reason: HOSPADM

## 2022-11-03 RX ORDER — ONDANSETRON 4 MG/1
4 TABLET, ORALLY DISINTEGRATING ORAL
Status: DISCONTINUED | OUTPATIENT
Start: 2022-11-03 | End: 2022-11-11 | Stop reason: HOSPADM

## 2022-11-03 RX ORDER — POTASSIUM CHLORIDE 750 MG/1
60 TABLET, FILM COATED, EXTENDED RELEASE ORAL
Status: COMPLETED | OUTPATIENT
Start: 2022-11-03 | End: 2022-11-03

## 2022-11-03 RX ORDER — LISINOPRIL 10 MG/1
10 TABLET ORAL DAILY
Status: DISCONTINUED | OUTPATIENT
Start: 2022-11-04 | End: 2022-11-11 | Stop reason: HOSPADM

## 2022-11-03 RX ORDER — SODIUM CHLORIDE 0.9 % (FLUSH) 0.9 %
5-40 SYRINGE (ML) INJECTION EVERY 8 HOURS
Status: DISCONTINUED | OUTPATIENT
Start: 2022-11-03 | End: 2022-11-11 | Stop reason: HOSPADM

## 2022-11-03 RX ORDER — SODIUM CHLORIDE 0.9 % (FLUSH) 0.9 %
5-40 SYRINGE (ML) INJECTION AS NEEDED
Status: DISCONTINUED | OUTPATIENT
Start: 2022-11-03 | End: 2022-11-11 | Stop reason: HOSPADM

## 2022-11-03 RX ORDER — DEXTROSE, SODIUM CHLORIDE, AND POTASSIUM CHLORIDE 5; .45; .15 G/100ML; G/100ML; G/100ML
50 INJECTION INTRAVENOUS CONTINUOUS
Status: DISCONTINUED | OUTPATIENT
Start: 2022-11-03 | End: 2022-11-08

## 2022-11-03 RX ORDER — AMLODIPINE BESYLATE 5 MG/1
5 TABLET ORAL DAILY
Status: DISCONTINUED | OUTPATIENT
Start: 2022-11-04 | End: 2022-11-11 | Stop reason: HOSPADM

## 2022-11-03 RX ORDER — ENOXAPARIN SODIUM 100 MG/ML
40 INJECTION SUBCUTANEOUS DAILY
Status: DISCONTINUED | OUTPATIENT
Start: 2022-11-04 | End: 2022-11-07

## 2022-11-03 RX ORDER — POTASSIUM CHLORIDE 1.5 G/1.77G
POWDER, FOR SOLUTION ORAL
Status: DISCONTINUED
Start: 2022-11-03 | End: 2022-11-03 | Stop reason: WASHOUT

## 2022-11-03 RX ORDER — ONDANSETRON 2 MG/ML
4 INJECTION INTRAMUSCULAR; INTRAVENOUS
Status: DISCONTINUED | OUTPATIENT
Start: 2022-11-03 | End: 2022-11-11 | Stop reason: HOSPADM

## 2022-11-03 RX ORDER — POLYETHYLENE GLYCOL 3350 17 G/17G
17 POWDER, FOR SOLUTION ORAL DAILY PRN
Status: DISCONTINUED | OUTPATIENT
Start: 2022-11-03 | End: 2022-11-11 | Stop reason: HOSPADM

## 2022-11-03 RX ORDER — POTASSIUM CHLORIDE 7.45 MG/ML
10 INJECTION INTRAVENOUS
Status: COMPLETED | OUTPATIENT
Start: 2022-11-03 | End: 2022-11-03

## 2022-11-03 RX ADMIN — ONDANSETRON 4 MG: 2 INJECTION INTRAMUSCULAR; INTRAVENOUS at 18:23

## 2022-11-03 RX ADMIN — SODIUM CHLORIDE, PRESERVATIVE FREE 10 ML: 5 INJECTION INTRAVENOUS at 23:38

## 2022-11-03 RX ADMIN — POTASSIUM CHLORIDE 10 MEQ: 7.46 INJECTION, SOLUTION INTRAVENOUS at 14:27

## 2022-11-03 RX ADMIN — POTASSIUM CHLORIDE 10 MEQ: 7.46 INJECTION, SOLUTION INTRAVENOUS at 13:29

## 2022-11-03 RX ADMIN — DEXTROSE MONOHYDRATE, SODIUM CHLORIDE, AND POTASSIUM CHLORIDE 50 ML/HR: 50; 4.5; 1.49 INJECTION, SOLUTION INTRAVENOUS at 18:23

## 2022-11-03 RX ADMIN — MAGNESIUM SULFATE HEPTAHYDRATE 1 G: 1 INJECTION, SOLUTION INTRAVENOUS at 14:22

## 2022-11-03 RX ADMIN — ONDANSETRON 4 MG: 2 INJECTION INTRAMUSCULAR; INTRAVENOUS at 23:38

## 2022-11-03 RX ADMIN — POTASSIUM CHLORIDE 60 MEQ: 750 TABLET, FILM COATED, EXTENDED RELEASE ORAL at 13:29

## 2022-11-03 RX ADMIN — POTASSIUM CHLORIDE 10 MEQ: 7.46 INJECTION, SOLUTION INTRAVENOUS at 16:38

## 2022-11-03 RX ADMIN — POTASSIUM CHLORIDE 10 MEQ: 7.46 INJECTION, SOLUTION INTRAVENOUS at 15:27

## 2022-11-03 RX ADMIN — SODIUM BICARBONATE: 84 INJECTION, SOLUTION INTRAVENOUS at 18:22

## 2022-11-03 NOTE — ED PROVIDER NOTES
EMERGENCY DEPARTMENT HISTORY AND PHYSICAL EXAM      Date: 11/3/2022  Patient Name: Letty Wooten    History of Presenting Illness     Chief Complaint   Patient presents with    Shortness of Breath    Fatigue    Vomiting       History Provided By: Patient    HPI: Letty Wooten, 78 y.o. female with no past medical history significant for CVA undergoing treatment presents with weakness and nausea and fatigue with some mild shortness of breath. Is going on for the past couple days and worse each day. Have not treated with anything is felt to improve. She does endorse some diarrhea at times. She has not treated it with anything either. There are no other complaints, changes, or physical findings at this time. PCP: Lisa Myers MD    Current Facility-Administered Medications   Medication Dose Route Frequency Provider Last Rate Last Admin    potassium chloride 10 mEq in 100 ml IVPB  10 mEq IntraVENous Q1H Juanjo Nava  mL/hr at 11/03/22 1638 10 mEq at 11/03/22 1638     Current Outpatient Medications   Medication Sig Dispense Refill    lisinopriL (PRINIVIL, ZESTRIL) 5 mg tablet Take 10 mg by mouth in the morning. amLODIPine (NORVASC) 5 mg tablet Take 5 mg by mouth in the morning. atorvastatin (LIPITOR) 20 mg tablet Take  by mouth daily.          Past History   Past Medical History:  Past Medical History:   Diagnosis Date    Cancer [facility])        Past Surgical History:  Past Surgical History:   Procedure Laterality Date    HX CATARACT REMOVAL      HX HYSTERECTOMY      HX OOPHORECTOMY      IR CHOLECYSTOSTOMY PERCUTANEOUS      IR INSERT TUNL CVC W PORT OVER 5 YEARS  7/20/2022       Family History:  Family History   Problem Relation Age of Onset    Breast Cancer Mother        Social History:  Social History     Tobacco Use    Smoking status: Never    Smokeless tobacco: Never   Vaping Use    Vaping Use: Never used   Substance Use Topics    Alcohol use: Not Currently    Drug use: Never Allergies:  No Known Allergies  Review of Systems   Review of Systems   Constitutional:  Positive for fatigue. Gastrointestinal:  Positive for diarrhea. Neurological:  Positive for weakness. Physical Exam   Physical Exam  Vitals and nursing note reviewed. Constitutional:       General: She is not in acute distress. Appearance: She is well-developed. HENT:      Head: Normocephalic and atraumatic. Nose: Nose normal.      Mouth/Throat:      Mouth: Mucous membranes are moist.      Pharynx: Oropharynx is clear. No oropharyngeal exudate. Eyes:      General:         Right eye: No discharge. Left eye: No discharge. Conjunctiva/sclera: Conjunctivae normal.      Pupils: Pupils are equal, round, and reactive to light. Cardiovascular:      Rate and Rhythm: Normal rate and regular rhythm. Chest Wall: PMI is not displaced. No thrill. Heart sounds: Normal heart sounds. No murmur heard. No friction rub. No gallop. Comments: Port in right upper chest  Pulmonary:      Effort: Pulmonary effort is normal. No respiratory distress. Breath sounds: Normal breath sounds. No wheezing or rales. Chest:      Chest wall: No tenderness. Abdominal:      General: Bowel sounds are normal. There is no distension. Palpations: Abdomen is soft. There is no mass. Tenderness: There is no abdominal tenderness. There is no guarding or rebound. Musculoskeletal:         General: Normal range of motion. Cervical back: Normal range of motion and neck supple. Lymphadenopathy:      Cervical: No cervical adenopathy. Skin:     General: Skin is warm and dry. Capillary Refill: Capillary refill takes less than 2 seconds. Findings: No erythema or rash. Neurological:      Mental Status: She is alert and oriented to person, place, and time. Cranial Nerves: No cranial nerve deficit.       Coordination: Coordination normal.   Psychiatric:         Mood and Affect: Mood normal.         Behavior: Behavior normal.       Lab and Diagnostic Study Results   Labs -     Recent Results (from the past 12 hour(s))   EKG, 12 LEAD, INITIAL    Collection Time: 11/03/22 11:55 AM   Result Value Ref Range    Ventricular Rate 96 BPM    Atrial Rate 96 BPM    P-R Interval 152 ms    QRS Duration 86 ms    Q-T Interval 464 ms    QTC Calculation (Bezet) 586 ms    Calculated P Axis 71 degrees    Calculated R Axis 45 degrees    Calculated T Axis 49 degrees    Diagnosis       Normal sinus rhythm  ST & T wave abnormality, consider inferior ischemia  ST & T wave abnormality, consider anterolateral ischemia  Abnormal ECG  No previous ECGs available  Confirmed by AYAZ ELLER, Mira Redding ([id number]) on 11/3/2022 12:35:14 PM     CBC WITH AUTOMATED DIFF    Collection Time: 11/03/22 12:08 PM   Result Value Ref Range    WBC 8.0 3.6 - 11.0 K/uL    RBC 3.59 (L) 3.80 - 5.20 M/uL    HGB 11.8 11.5 - 16.0 g/dL    HCT 33.0 (L) 35.0 - 47.0 %    MCV 91.9 80.0 - 99.0 FL    MCH 32.9 26.0 - 34.0 PG    MCHC 35.8 30.0 - 36.5 g/dL    RDW 20.0 (H) 11.5 - 14.5 %    PLATELET 266 (L) 405 - 400 K/uL    MPV 11.1 8.9 - 12.9 FL    NRBC 0.3 (H) 0.0  WBC    ABSOLUTE NRBC 0.02 (H) 0.00 - 0.01 K/uL    NEUTROPHILS 81 (H) 32 - 75 %    LYMPHOCYTES 12 12 - 49 %    MONOCYTES 7 5 - 13 %    EOSINOPHILS 0 0 - 7 %    BASOPHILS 0 0 - 1 %    IMMATURE GRANULOCYTES 0 0 - 0.5 %    ABS. NEUTROPHILS 6.4 1.8 - 8.0 K/UL    ABS. LYMPHOCYTES 1.0 0.8 - 3.5 K/UL    ABS. MONOCYTES 0.6 0.0 - 1.0 K/UL    ABS. EOSINOPHILS 0.0 0.0 - 0.4 K/UL    ABS. BASOPHILS 0.0 0.0 - 0.1 K/UL    ABS. IMM.  GRANS. 0.0 0.00 - 0.04 K/UL    DF AUTOMATED     METABOLIC PANEL, COMPREHENSIVE    Collection Time: 11/03/22 12:08 PM   Result Value Ref Range    Sodium 145 136 - 145 mmol/L    Potassium 2.0 (LL) 3.5 - 5.1 mmol/L    Chloride 115 (H) 97 - 108 mmol/L    CO2 12 (LL) 21 - 32 mmol/L    Anion gap 18 (H) 5 - 15 mmol/L    Glucose 155 (H) 65 - 100 mg/dL    BUN 5 (L) 6 - 20 mg/dL    Creatinine 1.35 (H) 0.55 - 1.02 mg/dL    BUN/Creatinine ratio 4 (L) 12 - 20      eGFR 43 (L) >60 ml/min/1.73m2    Calcium 9.9 8.5 - 10.1 mg/dL    Bilirubin, total 0.4 0.2 - 1.0 mg/dL    AST (SGOT) 16 15 - 37 U/L    ALT (SGPT) 10 (L) 12 - 78 U/L    Alk. phosphatase 99 45 - 117 U/L    Protein, total 8.3 (H) 6.4 - 8.2 g/dL    Albumin 3.6 3.5 - 5.0 g/dL    Globulin 4.7 (H) 2.0 - 4.0 g/dL    A-G Ratio 0.8 (L) 1.1 - 2.2     TROPONIN-HIGH SENSITIVITY    Collection Time: 11/03/22 12:08 PM   Result Value Ref Range    Troponin-High Sensitivity 37 0 - 51 ng/L   MAGNESIUM    Collection Time: 11/03/22  2:26 PM   Result Value Ref Range    Magnesium 1.9 1.6 - 2.4 mg/dL       Radiologic Studies -   [unfilled]  CT Results  (Last 48 hours)      None          CXR Results  (Last 48 hours)                 11/03/22 1521  XR CHEST PORT Final result    Impression:  No Acute Disease. Narrative:  EXAM: Portable CXR. 1507 hours. INDICATION: Chest Pain       FINDINGS:   The lungs appear clear. Heart is normal in size. There is no pulmonary edema. There is no evident pneumothorax or pleural effusion. Port catheter appears   satisfactory. Medical Decision Making and ED Course   Differential Diagnosis & Medical Decision Making Provider Note:   Hydration, also an abnormality, UTI, ACS, arrhythmia. Will assess with basic cardiac labs EKG. - I am the first and primary provider for this patient. I reviewed the vital signs, available nursing notes, past medical history, past surgical history, family history and social history. The patient's presenting problems have been discussed, and the staff are in agreement with the care plan formulated and outlined with them. I have encouraged them to ask questions as they arise throughout their visit. Vital Signs-Reviewed the patient's vital signs.   Patient Vitals for the past 12 hrs:   Temp Pulse Resp BP SpO2   11/03/22 1639 98.4 °F (36.9 °C) 89 20 136/83 100 %   11/03/22 1600 -- 81 14 126/80 100 %   11/03/22 1526 98.2 °F (36.8 °C) 81 18 134/85 100 %   11/03/22 1430 -- -- -- -- 100 %   11/03/22 1400 -- 80 16 131/89 --   11/03/22 1315 98.6 °F (37 °C) 68 -- 134/83 100 %   11/03/22 1152 98.2 °F (36.8 °C) 98 19 (!) 128/90 100 %       EKG interpretation: (Preliminary): EKG Interpreted by me. Shows: Rate 96 bpm,  ms, QRS duration 86 ms,  ms. Interpretation: Normal sinus rhythm with ST-T wave abnormality. Abnormal EKG. ED Course:   Has low potassium. Will be repleted. Profound loss of potassium we will need to admit the patient for further treatment and causality. Procedures and Critical Care     Performed by: Naima Banuelos MD  Procedures      CRITICAL CARE NOTE :    2:38 PM    IMPENDING DETERIORATION -Metabolic  ASSOCIATED RISK FACTORS - Metabolic changes  MANAGEMENT- Bedside Assessment and Supervision of Care  INTERPRETATION -  ECG and Cardiac Output Measures   INTERVENTIONS - Metobolic interventions  CASE REVIEW - Medical Sub-Specialist  TREATMENT RESPONSE -Improved  PERFORMED BY - Self    NOTES   :  I have spent 45 minutes of critical care time involved in lab review, consultations with specialist, family decision- making, bedside attention and documentation. This time excludes time spent in any separate billed procedures. During this entire length of time I was immediately available to the patient . Naima Banuelos MD    Disposition   Disposition: Condition stable        Diagnosis/Clinical Impression     Clinical Impression:   1. Hypokalemia    2. Metabolic acidosis        Attestations: Edy Ch MD, am the primary clinician of record. Please note that this dictation was completed with [employer], the [employer] voice recognition software. Quite often unanticipated grammatical, syntax, homophones, and other interpretive errors are inadvertently transcribed by the computer software. Please disregard these errors.   Please excuse any errors that have escaped final proofreading. Thank you.

## 2022-11-03 NOTE — H&P
History and Physical    Patient: Kevin El MRN: [id number]  SSN: [id number]    YOB: 1955  Age: 78 y.o. Sex: female      Subjective:      Kevin El is a 78 y.o. female who follows with Dr. Jonnathan Reddy for Esophageal CA and is s/p XRT and Chemo over past 2 months with increasing fatigue, n/v. Outpatient w/up showed low potassium for which she was prescribed replacement and she took. However, d/t persistent weakness, she came to ER. She is accompanied by her daughter at bedside. Patient lives by herself. + Sob, cough. No definite fever. In ER, K+ was 2.0 with CO2 of 12. Replacement as started. PMH: HTN, HLD, Esphageal CA    Past Medical History:   Diagnosis Date    Cancer ([facility] [address]      Past Surgical History:   Procedure Laterality Date    HX CATARACT REMOVAL      HX HYSTERECTOMY      HX OOPHORECTOMY      IR CHOLECYSTOSTOMY PERCUTANEOUS      IR INSERT TUNL CVC W PORT OVER 5 YEARS  7/20/2022      Family History   Problem Relation Age of Onset    Breast Cancer Mother      Social History     Tobacco Use    Smoking status: Never    Smokeless tobacco: Never   Substance Use Topics    Alcohol use: Not Currently      Prior to Admission medications    Medication Sig Start Date End Date Taking? Authorizing Provider   lisinopriL (PRINIVIL, ZESTRIL) 5 mg tablet Take 10 mg by mouth in the morning. Provider, Historical   amLODIPine (NORVASC) 5 mg tablet Take 5 mg by mouth in the morning. Provider, Historical   atorvastatin (LIPITOR) 20 mg tablet Take  by mouth daily.     Provider, Historical        No Known Allergies    Review of Systems:  Constitutional: No fevers, No chills, No night sweats, + fatigue/weakness, No significant weight change  Eyes: No visual disturbance, No loss of vision  HENT: No nasal congestion, No sore throat, No head lesion, No hearing deficit  Respiratory: No cough, No sputum, No wheezing, No SOB, No hemoptysis, No pleuritic CP  Cardiovascular: No chest pain, No lower extremity edema, No palpitations, No PND, No orthopnea, No JIANG  Gastrointestinal: + nausea/vomiting, No diarrhea, No constipation, No abdominal pain, No Melena, No hematemesis, No BRBPR. PEG  Genitourinary: No frequency, No dysuria, No hematuria, No discharge  Integument: No rash, No new skin lesion(s), No dryness, No new palpable nodule  Musculoskeletal: No arthralgias, No neck pain, No back pain, No joint pain, No fall or injury  Neurological: No headaches, No dizziness, No confusion,  No seizures, No focal weakness, No LOC, No paresthesia  Heme/Onc: No overt bleeding noted, No obvious swollen glands  Behavioral/Psychiatric: No change in mood; no SI; no hallucination      Objective:     Vitals:    11/03/22 1430 11/03/22 1526 11/03/22 1600 11/03/22 1639   BP:  134/85 126/80 136/83   Pulse:  81 81 89   Resp:  18 14 20   Temp:  98.2 °F (36.8 °C)  98.4 °F (36.9 °C)   SpO2: 100% 100% 100% 100%   Weight:       Height:            Physical Exam:    General: Awake and responsive, NAD  Head: atraumatic  Eye: No overt orbital findings, PERRLA   ENT: No overt hearing loss noted; No nasal lesion; Throat clear  Neck: Supple, radiation scarring and inflammatory changes  Vascular: No carotid bruit, palpable pulses bilat  Lung: CTA bilat, vesicular breath sounds  Heart: S1S2, No significant murmur appreciated; Sinus on Tele  Abdomen: Soft, NT, No rigidity, No rebound, Bowel sounds +; PEG in place  Extremities: No edema  M/S: No traumatic change, active mobility noted  Skin: Keloid scar upper chest  Neurologic: No overt focal motor deficit. AxOx3.    Psychiatric: Coherent and age appropriate affect    Recent Results (from the past 24 hour(s))   EKG, 12 LEAD, INITIAL    Collection Time: 11/03/22 11:55 AM   Result Value Ref Range    Ventricular Rate 96 BPM    Atrial Rate 96 BPM    P-R Interval 152 ms    QRS Duration 86 ms    Q-T Interval 464 ms    QTC Calculation (Bezet) 586 ms    Calculated P Axis 71 degrees    Calculated R Axis 45 degrees    Calculated T Axis 49 degrees    Diagnosis       Normal sinus rhythm  ST & T wave abnormality, consider inferior ischemia  ST & T wave abnormality, consider anterolateral ischemia  Abnormal ECG  No previous ECGs available  Confirmed by AYAZ ELLER, Jermaine John ([id number]) on 11/3/2022 12:35:14 PM     CBC WITH AUTOMATED DIFF    Collection Time: 11/03/22 12:08 PM   Result Value Ref Range    WBC 8.0 3.6 - 11.0 K/uL    RBC 3.59 (L) 3.80 - 5.20 M/uL    HGB 11.8 11.5 - 16.0 g/dL    HCT 33.0 (L) 35.0 - 47.0 %    MCV 91.9 80.0 - 99.0 FL    MCH 32.9 26.0 - 34.0 PG    MCHC 35.8 30.0 - 36.5 g/dL    RDW 20.0 (H) 11.5 - 14.5 %    PLATELET 999 (L) 508 - 400 K/uL    MPV 11.1 8.9 - 12.9 FL    NRBC 0.3 (H) 0.0  WBC    ABSOLUTE NRBC 0.02 (H) 0.00 - 0.01 K/uL    NEUTROPHILS 81 (H) 32 - 75 %    LYMPHOCYTES 12 12 - 49 %    MONOCYTES 7 5 - 13 %    EOSINOPHILS 0 0 - 7 %    BASOPHILS 0 0 - 1 %    IMMATURE GRANULOCYTES 0 0 - 0.5 %    ABS. NEUTROPHILS 6.4 1.8 - 8.0 K/UL    ABS. LYMPHOCYTES 1.0 0.8 - 3.5 K/UL    ABS. MONOCYTES 0.6 0.0 - 1.0 K/UL    ABS. EOSINOPHILS 0.0 0.0 - 0.4 K/UL    ABS. BASOPHILS 0.0 0.0 - 0.1 K/UL    ABS. IMM. GRANS. 0.0 0.00 - 0.04 K/UL    DF AUTOMATED     METABOLIC PANEL, COMPREHENSIVE    Collection Time: 11/03/22 12:08 PM   Result Value Ref Range    Sodium 145 136 - 145 mmol/L    Potassium 2.0 (LL) 3.5 - 5.1 mmol/L    Chloride 115 (H) 97 - 108 mmol/L    CO2 12 (LL) 21 - 32 mmol/L    Anion gap 18 (H) 5 - 15 mmol/L    Glucose 155 (H) 65 - 100 mg/dL    BUN 5 (L) 6 - 20 mg/dL    Creatinine 1.35 (H) 0.55 - 1.02 mg/dL    BUN/Creatinine ratio 4 (L) 12 - 20      eGFR 43 (L) >60 ml/min/1.73m2    Calcium 9.9 8.5 - 10.1 mg/dL    Bilirubin, total 0.4 0.2 - 1.0 mg/dL    AST (SGOT) 16 15 - 37 U/L    ALT (SGPT) 10 (L) 12 - 78 U/L    Alk.  phosphatase 99 45 - 117 U/L    Protein, total 8.3 (H) 6.4 - 8.2 g/dL    Albumin 3.6 3.5 - 5.0 g/dL    Globulin 4.7 (H) 2.0 - 4.0 g/dL    A-G Ratio 0.8 (L) 1.1 - 2.2     TROPONIN-HIGH SENSITIVITY    Collection Time: 11/03/22 12:08 PM   Result Value Ref Range    Troponin-High Sensitivity 37 0 - 51 ng/L   MAGNESIUM    Collection Time: 11/03/22  2:26 PM   Result Value Ref Range    Magnesium 1.9 1.6 - 2.4 mg/dL   ETHYL ALCOHOL    Collection Time: 11/03/22  4:42 PM   Result Value Ref Range    ALCOHOL(ETHYL),SERUM <10 <10 mg/dL   ACETAMINOPHEN    Collection Time: 11/03/22  4:42 PM   Result Value Ref Range    Acetaminophen level <10 (L) 10 - 30 ug/mL   SALICYLATE    Collection Time: 11/03/22  4:42 PM   Result Value Ref Range    Salicylate level <6.0 (L) 2.8 - 20.0 mg/dL     Assessment/Plan:     78 AA Female with Esophageal and has completed XRT + Chemo with:    # Critical Life-threatening Hypokalemia  # SEBAS with Metabolic Acidosis  # Hx HTN    => IVF with NaHCO3 + K+  => Repeat labs  => Sx control  => C/s Renal & GI  => Hold ACE-I  => Reviewed home meds in Epic    Disposition Status: Admit to tele  DVT Prophylaxis: Lovenox  GI Prophylaxis: Protonix  Code Status: FULL  POA: Self/Daughter  Advanced Directive Discussion: N/A    Critical Care Total Time: N/A      Signed By: Shahram Lewis MD     November 3, 2022

## 2022-11-03 NOTE — ED TRIAGE NOTES
Presents with c/o sob, fatigue and vomiting x 1 week. Hx of esophageal CA, last chemo appt sept 2022.

## 2022-11-04 LAB
ALBUMIN SERPL-MCNC: 2.8 G/DL (ref 3.5–5)
ALBUMIN/GLOB SERPL: 0.8 {RATIO} (ref 1.1–2.2)
ALP SERPL-CCNC: 77 U/L (ref 45–117)
ALT SERPL-CCNC: 8 U/L (ref 12–78)
ANION GAP SERPL CALC-SCNC: 10 MMOL/L (ref 5–15)
ANION GAP SERPL CALC-SCNC: 7 MMOL/L (ref 5–15)
AST SERPL W P-5'-P-CCNC: 13 U/L (ref 15–37)
ATRIAL RATE: 82 BPM
BASOPHILS # BLD: 0 K/UL (ref 0–0.1)
BASOPHILS NFR BLD: 0 % (ref 0–1)
BILIRUB SERPL-MCNC: 0.6 MG/DL (ref 0.2–1)
BUN SERPL-MCNC: 14 MG/DL (ref 6–20)
BUN SERPL-MCNC: 14 MG/DL (ref 6–20)
BUN/CREAT SERPL: 17 (ref 12–20)
BUN/CREAT SERPL: 18 (ref 12–20)
CA-I BLD-MCNC: 8.6 MG/DL (ref 8.5–10.1)
CA-I BLD-MCNC: 8.7 MG/DL (ref 8.5–10.1)
CALCULATED P AXIS, ECG09: 68 DEGREES
CALCULATED R AXIS, ECG10: 70 DEGREES
CALCULATED T AXIS, ECG11: 50 DEGREES
CHLORIDE SERPL-SCNC: 117 MMOL/L (ref 97–108)
CHLORIDE SERPL-SCNC: 118 MMOL/L (ref 97–108)
CO2 SERPL-SCNC: 19 MMOL/L (ref 21–32)
CO2 SERPL-SCNC: 23 MMOL/L (ref 21–32)
CREAT SERPL-MCNC: 0.8 MG/DL (ref 0.55–1.02)
CREAT SERPL-MCNC: 0.82 MG/DL (ref 0.55–1.02)
DIAGNOSIS, 93000: NORMAL
DIFFERENTIAL METHOD BLD: ABNORMAL
EOSINOPHIL # BLD: 0 K/UL (ref 0–0.4)
EOSINOPHIL NFR BLD: 0 % (ref 0–7)
ERYTHROCYTE [DISTWIDTH] IN BLOOD BY AUTOMATED COUNT: 19.3 % (ref 11.5–14.5)
GLOBULIN SER CALC-MCNC: 3.6 G/DL (ref 2–4)
GLUCOSE SERPL-MCNC: 111 MG/DL (ref 65–100)
GLUCOSE SERPL-MCNC: 95 MG/DL (ref 65–100)
HCT VFR BLD AUTO: 24.9 % (ref 35–47)
HGB BLD-MCNC: 9.1 G/DL (ref 11.5–16)
IMM GRANULOCYTES # BLD AUTO: 0 K/UL (ref 0–0.04)
IMM GRANULOCYTES NFR BLD AUTO: 1 % (ref 0–0.5)
LIPASE SERPL-CCNC: 140 U/L (ref 73–393)
LYMPHOCYTES # BLD: 0.8 K/UL (ref 0.8–3.5)
LYMPHOCYTES NFR BLD: 14 % (ref 12–49)
MAGNESIUM SERPL-MCNC: 2 MG/DL (ref 1.6–2.4)
MCH RBC QN AUTO: 32.7 PG (ref 26–34)
MCHC RBC AUTO-ENTMCNC: 36.5 G/DL (ref 30–36.5)
MCV RBC AUTO: 89.6 FL (ref 80–99)
MONOCYTES # BLD: 0.5 K/UL (ref 0–1)
MONOCYTES NFR BLD: 8 % (ref 5–13)
NEUTS SEG # BLD: 4.5 K/UL (ref 1.8–8)
NEUTS SEG NFR BLD: 77 % (ref 32–75)
NRBC # BLD: 0.02 K/UL (ref 0–0.01)
NRBC BLD-RTO: 0.3 PER 100 WBC
P-R INTERVAL, ECG05: 156 MS
PLATELET # BLD AUTO: 102 K/UL (ref 150–400)
PMV BLD AUTO: 11.2 FL (ref 8.9–12.9)
POTASSIUM SERPL-SCNC: 2.6 MMOL/L (ref 3.5–5.1)
POTASSIUM SERPL-SCNC: 2.8 MMOL/L (ref 3.5–5.1)
PROCALCITONIN SERPL-MCNC: <0.05 NG/ML
PROT SERPL-MCNC: 6.4 G/DL (ref 6.4–8.2)
Q-T INTERVAL, ECG07: 416 MS
QRS DURATION, ECG06: 84 MS
QTC CALCULATION (BEZET), ECG08: 486 MS
RBC # BLD AUTO: 2.78 M/UL (ref 3.8–5.2)
SODIUM SERPL-SCNC: 147 MMOL/L (ref 136–145)
SODIUM SERPL-SCNC: 147 MMOL/L (ref 136–145)
VENTRICULAR RATE, ECG03: 82 BPM
WBC # BLD AUTO: 5.8 K/UL (ref 3.6–11)

## 2022-11-04 PROCEDURE — 97161 PT EVAL LOW COMPLEX 20 MIN: CPT

## 2022-11-04 PROCEDURE — 83735 ASSAY OF MAGNESIUM: CPT

## 2022-11-04 PROCEDURE — 97530 THERAPEUTIC ACTIVITIES: CPT

## 2022-11-04 PROCEDURE — 74011250636 HC RX REV CODE- 250/636: Performed by: INTERNAL MEDICINE

## 2022-11-04 PROCEDURE — 85025 COMPLETE CBC W/AUTO DIFF WBC: CPT

## 2022-11-04 PROCEDURE — 84145 PROCALCITONIN (PCT): CPT

## 2022-11-04 PROCEDURE — 74011250637 HC RX REV CODE- 250/637: Performed by: INTERNAL MEDICINE

## 2022-11-04 PROCEDURE — 65270000029 HC RM PRIVATE

## 2022-11-04 PROCEDURE — 97165 OT EVAL LOW COMPLEX 30 MIN: CPT

## 2022-11-04 PROCEDURE — 36415 COLL VENOUS BLD VENIPUNCTURE: CPT

## 2022-11-04 PROCEDURE — 80048 BASIC METABOLIC PNL TOTAL CA: CPT

## 2022-11-04 PROCEDURE — 93005 ELECTROCARDIOGRAM TRACING: CPT

## 2022-11-04 PROCEDURE — 74011000250 HC RX REV CODE- 250: Performed by: INTERNAL MEDICINE

## 2022-11-04 PROCEDURE — C9113 INJ PANTOPRAZOLE SODIUM, VIA: HCPCS | Performed by: INTERNAL MEDICINE

## 2022-11-04 PROCEDURE — 83690 ASSAY OF LIPASE: CPT

## 2022-11-04 PROCEDURE — 80053 COMPREHEN METABOLIC PANEL: CPT

## 2022-11-04 RX ORDER — LIDOCAINE HYDROCHLORIDE 20 MG/ML
SOLUTION ORAL; TOPICAL
COMMUNITY
Start: 2022-08-16

## 2022-11-04 RX ORDER — ONDANSETRON 4 MG/1
TABLET, ORALLY DISINTEGRATING ORAL
COMMUNITY
Start: 2022-08-22

## 2022-11-04 RX ORDER — SODIUM CHLORIDE 9 MG/ML
75 INJECTION, SOLUTION INTRAVENOUS CONTINUOUS
Status: CANCELLED | OUTPATIENT
Start: 2022-11-04

## 2022-11-04 RX ORDER — POTASSIUM CHLORIDE 7.45 MG/ML
10 INJECTION INTRAVENOUS
Status: ACTIVE | OUTPATIENT
Start: 2022-11-04 | End: 2022-11-04

## 2022-11-04 RX ORDER — POTASSIUM CHLORIDE 40 MEQ/15ML
SOLUTION ORAL
COMMUNITY
Start: 2022-10-24 | End: 2022-11-11

## 2022-11-04 RX ORDER — SODIUM CHLORIDE 0.9 % (FLUSH) 0.9 %
5-40 SYRINGE (ML) INJECTION AS NEEDED
Status: CANCELLED | OUTPATIENT
Start: 2022-11-04

## 2022-11-04 RX ORDER — SODIUM CHLORIDE 0.9 % (FLUSH) 0.9 %
5-40 SYRINGE (ML) INJECTION EVERY 8 HOURS
Status: CANCELLED | OUTPATIENT
Start: 2022-11-04

## 2022-11-04 RX ORDER — PROCHLORPERAZINE EDISYLATE 5 MG/ML
10 INJECTION INTRAMUSCULAR; INTRAVENOUS
Status: DISCONTINUED | OUTPATIENT
Start: 2022-11-04 | End: 2022-11-11 | Stop reason: HOSPADM

## 2022-11-04 RX ORDER — SODIUM BICARBONATE 650 MG/1
650 TABLET ORAL 3 TIMES DAILY
Status: DISCONTINUED | OUTPATIENT
Start: 2022-11-04 | End: 2022-11-09

## 2022-11-04 RX ORDER — POTASSIUM CHLORIDE 1.5 G/1.77G
40 POWDER, FOR SOLUTION ORAL
Status: COMPLETED | OUTPATIENT
Start: 2022-11-04 | End: 2022-11-04

## 2022-11-04 RX ORDER — POTASSIUM CHLORIDE 1.5 G/1.77G
20 POWDER, FOR SOLUTION ORAL
Status: COMPLETED | OUTPATIENT
Start: 2022-11-04 | End: 2022-11-04

## 2022-11-04 RX ORDER — SILVER SULFADIAZINE 10 G/1000G
CREAM TOPICAL
COMMUNITY
Start: 2022-09-21

## 2022-11-04 RX ADMIN — AMLODIPINE BESYLATE 5 MG: 5 TABLET ORAL at 11:42

## 2022-11-04 RX ADMIN — SODIUM CHLORIDE, PRESERVATIVE FREE 10 ML: 5 INJECTION INTRAVENOUS at 18:34

## 2022-11-04 RX ADMIN — ATORVASTATIN CALCIUM 20 MG: 20 TABLET, FILM COATED ORAL at 18:34

## 2022-11-04 RX ADMIN — DEXTROSE MONOHYDRATE, SODIUM CHLORIDE, AND POTASSIUM CHLORIDE 50 ML/HR: 50; 4.5; 1.49 INJECTION, SOLUTION INTRAVENOUS at 22:47

## 2022-11-04 RX ADMIN — SODIUM CHLORIDE, PRESERVATIVE FREE 10 ML: 5 INJECTION INTRAVENOUS at 22:52

## 2022-11-04 RX ADMIN — SODIUM CHLORIDE, PRESERVATIVE FREE 10 ML: 5 INJECTION INTRAVENOUS at 11:30

## 2022-11-04 RX ADMIN — SODIUM BICARBONATE 650 MG: 650 TABLET ORAL at 22:44

## 2022-11-04 RX ADMIN — SODIUM CHLORIDE, PRESERVATIVE FREE 40 MG: 5 INJECTION INTRAVENOUS at 22:44

## 2022-11-04 RX ADMIN — ENOXAPARIN SODIUM 40 MG: 100 INJECTION SUBCUTANEOUS at 11:41

## 2022-11-04 RX ADMIN — POTASSIUM CHLORIDE 40 MEQ: 1.5 FOR SOLUTION ORAL at 11:42

## 2022-11-04 RX ADMIN — POTASSIUM CHLORIDE 20 MEQ: 1.5 FOR SOLUTION ORAL at 18:34

## 2022-11-04 RX ADMIN — SODIUM CHLORIDE, PRESERVATIVE FREE 40 MG: 5 INJECTION INTRAVENOUS at 11:41

## 2022-11-04 NOTE — PROGRESS NOTES
Hospitalist Progress Note    Daily Progress Note: 11/4/2022 2:10 PM    Assessment/Plan     78 AA Female with Esophageal and has completed XRT + Chemo with:     # Critical Life-threatening Hypokalemia  # SEBAS with Metabolic Acidosis  # Hx HTN     => IVF with NaHCO3 + K+  => Repeat labs  => Sx control  => C/s Renal & GI  => Hold ACE-I  => Reviewed home meds in Epic     Disposition Status: Continue Inpatient Mgmt; SNF ~24-48h  DVT Prophylaxis: Lovenox  GI Prophylaxis: Protonix  Code Status: FULL  POA: Self/Daughter  Advanced Directive Discussion: N/A  -----------------------------------------------------    Admission Hx/HPI:  Krish Sinha is a 78 y.o. female who follows with Dr. Keturah Gillespie for Esophageal CA and is s/p XRT and Chemo over past 2 months with increasing fatigue, n/v. Outpatient w/up showed low potassium for which she was prescribed replacement and she took. However, d/t persistent weakness, she came to ER. She is accompanied by her daughter at bedside. Patient lives by herself. + Sob, cough. No definite fever. In ER, K+ was 2.0 with CO2 of 12. Replacement as started. ----------------------------------------    Subjective:  Feeling better. No active nausea. No recurrent vomiting. K still low, CO2 improving. Spoke with Dr. Nereida Ferreira; will be seeing shortly. Per Nutrition:    Advance diet when medically appropriate to Puree   Consult SLP for diet texture/consistency   When able Restart Feeding via Peg (per home regimen) of Two Alok 237 ml   Bolus 4 x day   4.   Flush tube with 180 ml water q 4 hours  This will provide 1896 kcal (100%), 79 g pro (85%), 1744 ml (97%)  Monitor diet advancement, initiation of enteral feeding, I/O's, weight     Per PT: SNF      Review of Systems    Constitutional: No fevers, No chills, No night sweats, + fatigue/weakness, No significant weight change  Eyes: No visual disturbance, No loss of vision  HENT: No nasal congestion, No sore throat, No head lesion, No hearing deficit  Respiratory: No cough, No sputum, No wheezing, No SOB, No hemoptysis, No pleuritic CP  Cardiovascular: No chest pain, No lower extremity edema, No palpitations, No PND, No orthopnea, No JIANG  Gastrointestinal: + nausea/vomiting, No diarrhea, No constipation, No abdominal pain, No Melena, No hematemesis, No BRBPR.  PEG  Genitourinary: No frequency, No dysuria, No hematuria, No discharge  Integument: No rash, No new skin lesion(s), No dryness, No new palpable nodule  Musculoskeletal: No arthralgias, No neck pain, No back pain, No joint pain, No fall or injury  Neurological: No headaches, No dizziness, No confusion,  No seizures, No focal weakness, No LOC, No paresthesia  Heme/Onc: No overt bleeding noted, No obvious swollen glands  Behavioral/Psychiatric: No change in mood; no SI; no hallucination      Current Facility-Administered Medications   Medication Dose Route Frequency    famotidine (PF) (PEPCID) 20 mg in 0.9% sodium chloride 10 mL injection  20 mg IntraVENous Q12H PRN    sodium chloride (NS) flush 5-40 mL  5-40 mL IntraVENous Q8H    sodium chloride (NS) flush 5-40 mL  5-40 mL IntraVENous PRN    acetaminophen (TYLENOL) tablet 650 mg  650 mg Oral Q6H PRN    Or    acetaminophen (TYLENOL) suppository 650 mg  650 mg Rectal Q6H PRN    polyethylene glycol (MIRALAX) packet 17 g  17 g Oral DAILY PRN    ondansetron (ZOFRAN ODT) tablet 4 mg  4 mg Oral Q8H PRN    Or    ondansetron (ZOFRAN) injection 4 mg  4 mg IntraVENous Q6H PRN    enoxaparin (LOVENOX) injection 40 mg  40 mg SubCUTAneous DAILY    amLODIPine (NORVASC) tablet 5 mg  5 mg Oral DAILY    atorvastatin (LIPITOR) tablet 10 mg  10 mg Oral DAILY    [Held by provider] lisinopriL (PRINIVIL, ZESTRIL) tablet 10 mg  10 mg Oral DAILY    dextrose 5% - 0.45% NaCl with KCl 20 mEq/L infusion  50 mL/hr IntraVENous CONTINUOUS    sodium bicarbonate (8.4%) 50 mEq in 0.45% sodium chloride 1,000 mL infusion   IntraVENous CONTINUOUS    pantoprazole (PROTONIX) 40 mg in 0.9% sodium chloride 10 mL injection  40 mg IntraVENous Q12H       Objective     Visit Vitals  /76 (BP 1 Location: Left upper arm, BP Patient Position: Semi fowlers)   Pulse 78   Temp 97.9 °F (36.6 °C)   Resp 18   Ht 5' 6\" (1.676 m)   Wt 71.8 kg (158 lb 4.8 oz)   SpO2 100%   Breastfeeding No   BMI 25.55 kg/m²      O2 Device: None (Room air)    Temp (24hrs), Av.2 °F (36.8 °C), Min:97.9 °F (36.6 °C), Max:98.4 °F (36.9 °C)      No intake/output data recorded.  1901 -  0700  In: -   Out: 450 [Urine:450]      PHYSICAL EXAM    General: Awake and responsive, NAD; sitting in chair  Neck: Supple, radiation scarring and inflammatory changes  Vascular: No carotid bruit, palpable pulses bilat  Lung: CTA bilat, vesicular breath sounds  Heart: S1S2, No significant murmur appreciated; Sinus on Tele  Abdomen: Soft, NT, No rigidity, No rebound, Bowel sounds +; PEG in place  Extremities: No edema  M/S: No traumatic change, active mobility noted  Skin: Keloid scar upper chest  Neurologic: No overt focal motor deficit. AxOx3.    Psychiatric: Coherent and age appropriate affect      Data Review    Recent Results (from the past 24 hour(s))   MAGNESIUM    Collection Time: 22  2:26 PM   Result Value Ref Range    Magnesium 1.9 1.6 - 2.4 mg/dL   ETHYL ALCOHOL    Collection Time: 22  4:42 PM   Result Value Ref Range    ALCOHOL(ETHYL),SERUM <10 <10 mg/dL   ACETAMINOPHEN    Collection Time: 22  4:42 PM   Result Value Ref Range    Acetaminophen level <10 (L) 10 - 30 ug/mL   SALICYLATE    Collection Time: 22  4:42 PM   Result Value Ref Range    Salicylate level <6.1 (L) 2.8 - 53.8 mg/dL   METABOLIC PANEL, BASIC    Collection Time: 22  8:29 PM   Result Value Ref Range    Sodium 146 (H) 136 - 145 mmol/L    Potassium 2.5 (LL) 3.5 - 5.1 mmol/L    Chloride 121 (H) 97 - 108 mmol/L    CO2 13 (LL) 21 - 32 mmol/L    Anion gap 12 5 - 15 mmol/L    Glucose 126 (H) 65 - 100 mg/dL    BUN 5 (L) 6 - 20 mg/dL Creatinine 0.88 0.55 - 1.02 mg/dL    BUN/Creatinine ratio 6 (L) 12 - 20      eGFR >60 >60 ml/min/1.73m2    Calcium 8.9 8.5 - 10.1 mg/dL   EKG, 12 LEAD, INITIAL    Collection Time: 11/04/22  5:07 AM   Result Value Ref Range    Ventricular Rate 82 BPM    Atrial Rate 82 BPM    P-R Interval 156 ms    QRS Duration 84 ms    Q-T Interval 416 ms    QTC Calculation (Bezet) 486 ms    Calculated P Axis 68 degrees    Calculated R Axis 70 degrees    Calculated T Axis 50 degrees    Diagnosis       Normal sinus rhythm  ST & T wave abnormality, consider anterior ischemia  Abnormal ECG  No previous ECGs available  Confirmed by AYAZ ELLER, Marimar Suarez ([id number]) on 11/4/2022 2:05:55 PM     CBC WITH AUTOMATED DIFF    Collection Time: 11/04/22  7:57 AM   Result Value Ref Range    WBC 5.8 3.6 - 11.0 K/uL    RBC 2.78 (L) 3.80 - 5.20 M/uL    HGB 9.1 (L) 11.5 - 16.0 g/dL    HCT 24.9 (L) 35.0 - 47.0 %    MCV 89.6 80.0 - 99.0 FL    MCH 32.7 26.0 - 34.0 PG    MCHC 36.5 30.0 - 36.5 g/dL    RDW 19.3 (H) 11.5 - 14.5 %    PLATELET 663 (L) 692 - 400 K/uL    MPV 11.2 8.9 - 12.9 FL    NRBC 0.3 (H) 0.0  WBC    ABSOLUTE NRBC 0.02 (H) 0.00 - 0.01 K/uL    NEUTROPHILS 77 (H) 32 - 75 %    LYMPHOCYTES 14 12 - 49 %    MONOCYTES 8 5 - 13 %    EOSINOPHILS 0 0 - 7 %    BASOPHILS 0 0 - 1 %    IMMATURE GRANULOCYTES 1 (H) 0 - 0.5 %    ABS. NEUTROPHILS 4.5 1.8 - 8.0 K/UL    ABS. LYMPHOCYTES 0.8 0.8 - 3.5 K/UL    ABS. MONOCYTES 0.5 0.0 - 1.0 K/UL    ABS. EOSINOPHILS 0.0 0.0 - 0.4 K/UL    ABS. BASOPHILS 0.0 0.0 - 0.1 K/UL    ABS. IMM.  GRANS. 0.0 0.00 - 0.04 K/UL    DF AUTOMATED     METABOLIC PANEL, COMPREHENSIVE    Collection Time: 11/04/22  7:57 AM   Result Value Ref Range    Sodium 147 (H) 136 - 145 mmol/L    Potassium 2.6 (LL) 3.5 - 5.1 mmol/L    Chloride 118 (H) 97 - 108 mmol/L    CO2 19 (L) 21 - 32 mmol/L    Anion gap 10 5 - 15 mmol/L    Glucose 111 (H) 65 - 100 mg/dL    BUN 14 6 - 20 mg/dL    Creatinine 0.80 0.55 - 1.02 mg/dL    BUN/Creatinine ratio 18 12 - 20 eGFR >60 >60 ml/min/1.73m2    Calcium 8.6 8.5 - 10.1 mg/dL    Bilirubin, total 0.6 0.2 - 1.0 mg/dL    AST (SGOT) 13 (L) 15 - 37 U/L    ALT (SGPT) 8 (L) 12 - 78 U/L    Alk.  phosphatase 77 45 - 117 U/L    Protein, total 6.4 6.4 - 8.2 g/dL    Albumin 2.8 (L) 3.5 - 5.0 g/dL    Globulin 3.6 2.0 - 4.0 g/dL    A-G Ratio 0.8 (L) 1.1 - 2.2     MAGNESIUM    Collection Time: 11/04/22  7:57 AM   Result Value Ref Range    Magnesium 2.0 1.6 - 2.4 mg/dL   PROCALCITONIN    Collection Time: 11/04/22  7:57 AM   Result Value Ref Range    Procalcitonin <0.05 (H) 0 ng/mL   LIPASE    Collection Time: 11/04/22  7:57 AM   Result Value Ref Range    Lipase 140 73 - 393 U/L       XR CHEST PORT   Final Result   No Acute Disease.              ________________________________________  Randi Starr MD

## 2022-11-04 NOTE — CONSULTS
Gastroenterology Consult     Referring Physician: Jordan Cleveland MD     Consult Date: 11/4/2022     Subjective:     Chief Complaint: shortness of breath, fatigue, and vomiting. History of Present Illness: Chris Winn is a 78 y.o. female who is seen in consultation for  nausea, vomiting esophageal cancer. Ms. Virgie Alexandre has a past medical history significant for Esophageal cancer status post chemo and radiation, She had her last treatment in September, CVA, hypertension,and  hyperlipidemia. She presented to the ED on 11/3 with complaints of increased weakness, nausea and fatigue, with mild shortness of breath over the past couple days. She denies hematemesis, or abdominal pain. She does have a peg tube but she does eat by mouth. The peg tube has not been used since admission. She reports she is feeling weak and tires. She does admit to weight loss. Plan for EGD on Monday 11/7. Start on dysphagia puree diet.      Past Medical History:   Diagnosis Date    Cancer [facility])      Past Surgical History:   Procedure Laterality Date    HX CATARACT REMOVAL      HX HYSTERECTOMY      HX OOPHORECTOMY      IR CHOLECYSTOSTOMY PERCUTANEOUS      IR INSERT TUNL CVC W PORT OVER 5 YEARS  7/20/2022      Family History   Problem Relation Age of Onset    Breast Cancer Mother      Social History     Tobacco Use    Smoking status: Never    Smokeless tobacco: Never   Substance Use Topics    Alcohol use: Not Currently      No Known Allergies  Current Facility-Administered Medications   Medication Dose Route Frequency    famotidine (PF) (PEPCID) 20 mg in 0.9% sodium chloride 10 mL injection  20 mg IntraVENous Q12H PRN    potassium chloride (KLOR-CON) packet for solution 20 mEq  20 mEq Per G Tube NOW    sodium bicarbonate tablet 650 mg  650 mg Per G Tube TID    sodium chloride (NS) flush 5-40 mL  5-40 mL IntraVENous Q8H    sodium chloride (NS) flush 5-40 mL  5-40 mL IntraVENous PRN    acetaminophen (TYLENOL) tablet 650 mg  650 mg Oral Q6H PRN    Or    acetaminophen (TYLENOL) suppository 650 mg  650 mg Rectal Q6H PRN    polyethylene glycol (MIRALAX) packet 17 g  17 g Oral DAILY PRN    ondansetron (ZOFRAN ODT) tablet 4 mg  4 mg Oral Q8H PRN    Or    ondansetron (ZOFRAN) injection 4 mg  4 mg IntraVENous Q6H PRN    enoxaparin (LOVENOX) injection 40 mg  40 mg SubCUTAneous DAILY    amLODIPine (NORVASC) tablet 5 mg  5 mg Oral DAILY    atorvastatin (LIPITOR) tablet 20 mg  20 mg Oral DAILY    [Held by provider] lisinopriL (PRINIVIL, ZESTRIL) tablet 10 mg  10 mg Oral DAILY    dextrose 5% - 0.45% NaCl with KCl 20 mEq/L infusion  50 mL/hr IntraVENous CONTINUOUS    pantoprazole (PROTONIX) 40 mg in 0.9% sodium chloride 10 mL injection  40 mg IntraVENous Q12H        Review of Systems:  A detailed 10 organ review of systems is obtained with pertinent positives as listed in the History of Present Illness and Past Medical History. All others are negative. Objective:     Physical Exam:  Visit Vitals  BP (!) 145/73   Pulse 85   Temp 98.1 °F (36.7 °C)   Resp 18   Ht 5' 6\" (1.676 m)   Wt 71.8 kg (158 lb 4.8 oz)   SpO2 100%   Breastfeeding No   BMI 25.55 kg/m²        Skin:  Extremities and face reveal no rashes. No beavers erythema. No telangiectasias on the chest wall. HEENT: Sclerae anicteric. Extra-occular muscles are intact. No oral ulcers. Cardiovascular: Regular rate and rhythm. Respiratory:  Comfortable breathing with no accessory muscle use. GI:  Abdomen nondistended, soft, and nontender. Normal active bowel sounds. No enlargement of the liver or spleen. No masses palpable. Rectal:  Deferred  Musculoskeletal: Generalized weakness  Neurological:  Gross memory appears intact. Patient is alert and oriented. Psychiatric:  Mood appears appropriate with judgement intact. Lymphatic:  No cervical or supraclavicular adenopathy.     Lab/Data Review:  CMP:   Lab Results   Component Value Date/Time     (H) 11/04/2022 03:26 PM    K 2.8 (L) 11/04/2022 03:26 PM     (H) 11/04/2022 03:26 PM    CO2 23 11/04/2022 03:26 PM    AGAP 7 11/04/2022 03:26 PM    GLU 95 11/04/2022 03:26 PM    BUN 14 11/04/2022 03:26 PM    CREA 0.82 11/04/2022 03:26 PM    CA 8.7 11/04/2022 03:26 PM    MG 2.0 11/04/2022 07:57 AM    ALB 2.8 (L) 11/04/2022 07:57 AM    TP 6.4 11/04/2022 07:57 AM    GLOB 3.6 11/04/2022 07:57 AM    AGRAT 0.8 (L) 11/04/2022 07:57 AM    ALT 8 (L) 11/04/2022 07:57 AM     CBC:   Lab Results   Component Value Date/Time    WBC 5.8 11/04/2022 07:57 AM    HGB 9.1 (L) 11/04/2022 07:57 AM    HCT 24.9 (L) 11/04/2022 07:57 AM     (L) 11/04/2022 07:57 AM         Assessment/Plan:   Nausea, vomiting, Esophageal Cancer      Zofran as needed      Compazine as needed      Low fat dysphagia diet, strict aspiration Precautions      EGD on 11/7/22      NPO at midnight on 11/7/22    Thank you for allowing me to participate in this patients care  Plan discussed with Dr. Elva Aguirre and he approves.

## 2022-11-04 NOTE — ROUTINE PROCESS
TRANSFER - OUT REPORT:    Verbal report given to Brenda Calvillo RN on [facility]  being transferred to [address] [place] [address] for routine progression of care       Report consisted of patients Situation, Background, Assessment and   Recommendations(SBAR). Information from the following report(s) SBAR and ED Summary was reviewed with the receiving nurse. Lines:   Peripheral IV 11/03/22 Right Antecubital (Active)       Peripheral IV 11/03/22 Left Antecubital (Active)        Opportunity for questions and clarification was provided.       Patient transported with:   Monitor  Tech

## 2022-11-04 NOTE — PROGRESS NOTES
OCCUPATIONAL THERAPY EVALUATION  Patient: Lakia Felipe (44 y.o. female)  Date: 11/4/2022  Primary Diagnosis: Hypokalemia [P43.6]  Metabolic acidosis [N32.35]       Precautions: fall risk       ASSESSMENT  Pt is a 78 y.o. female presenting to [facility] with c/o increasing fatigue, N/V; per chart outpatient workup showed low potassium, however pt with persistent weakness therefore came to ED. In ED, K+ was 2.0 with CO2 of 12. Pt admitted 11/3/22 and currently being treated for hypokalemia, SEBAS with metabolic acidosis. Pt received semi-supine in bed upon arrival, AXO x4, and agreeable to OT evaluation. Based on current observations, pt presents with deficits in generalized strength/AROM, static/dynamic standing balance (see PT note for gait details), and functional activity tolerance currently impacting overall performance of ADLs and functional transfers/mobility (see below for objective details and assist levels). Overall, pt tolerates session fair with no c/o of pain or dizziness during session; requesting to get cleaned up s/t wet gown/linens and able to sit EOB for ADLs and transfer bed>chair using gt belt and RW w/CGA with additional time for balance/safety. Pt completed remainder of ADLs sitting in chair before transferring back to bedside CGA at end of session. Overall mobility limited s/t low potassium today. Pt would benefit from continued skilled OT services to address current impairments and improve IND and safety with self cares and functional transfers/mobility. Current OT d/c recommendation Alex Richardson once medically appropriate, however may progress to Pavan Boss with family care and RW pending overall progress during hospital stay. Other factors to consider for discharge: family/social support, DME, time since onset, severity of deficits, functional baseline     Patient will benefit from skilled therapy intervention to address the above noted impairments.        PLAN :  Recommendations and Planned Interventions: self care training, functional mobility training, therapeutic exercise, balance training, therapeutic activities, endurance activities, neuromuscular re-education, patient education, home safety training, and family training/education    Recommend with staff: Out of bed to chair for meals and Amb to bathroom for toileting with gt belt and AD    Recommend next session: Toileting    Frequency/Duration: Patient will be followed by occupational therapy:  3-5x/week to address goals. Recommendation for discharge: (in order for the patient to meet his/her long term goals)  Alex Richardson    This discharge recommendation:  Has been made in collaboration with the attending provider and/or case management    IF patient discharges home will need the following DME: walker: rolling       SUBJECTIVE:   Patient stated Jojo Furlough you help me get cleaned up some?     OBJECTIVE DATA SUMMARY:   HISTORY:   Past Medical History:   Diagnosis Date    Cancer ([address]      Past Surgical History:   Procedure Laterality Date    HX CATARACT REMOVAL      HX HYSTERECTOMY      HX OOPHORECTOMY      IR CHOLECYSTOSTOMY PERCUTANEOUS      IR INSERT TUNL CVC W PORT OVER 5 YEARS  7/20/2022       Per pt report:   Home Situation  Home Environment: Private residence (Replaced by [facility])  # Steps to Enter: 3  Rails to Enter: Yes  Hand Rails : Bilateral  Wheelchair Ramp: No  One/Two Story Residence: One story  Living Alone: Yes  Support Systems: Other Family Member(s) (daughter nearby)  Patient Expects to be Discharged to[de-identified] Unable to determine at this time  Current DME Used/Available at Home: None      EXAMINATION OF PERFORMANCE DEFICITS:  Cognitive/Behavioral Status:  Neurologic State: Alert  Orientation Level: Oriented X4  Cognition: Follows commands             Hearing:   Auditory  Auditory Impairment: None    Range of Motion:  AROM: Generally decreased, functional                         Strength:  Strength: Generally decreased, functional                Coordination:  Coordination: Within functional limits  Fine Motor Skills-Upper: Left Intact; Right Intact    Gross Motor Skills-Upper: Left Intact; Right Intact    Tone & Sensation:  Tone: Normal  Sensation: Intact                      Balance:  Sitting: Impaired; Without support  Sitting - Static: Good (unsupported)  Sitting - Dynamic: Fair (occasional)  Standing: Impaired; With support  Standing - Static: Good;Constant support  Standing - Dynamic : Fair;Constant support    Functional Mobility and Transfers for ADLs:  Bed Mobility:  Rolling: Stand-by assistance  Supine to Sit: Stand-by assistance  Scooting: Stand-by assistance    Transfers:  Sit to Stand: Contact guard assistance  Stand to Sit: Contact guard assistance  Bed to Chair: Contact guard assistance      ADL Intervention and task modifications:       Grooming  Grooming Assistance: Independent  Position Performed: Seated in chair  Washing Face: Independent              Upper [address]: Minimum  assistance         Toileting  Bowel Hygiene: Contact guard assistance         Therapeutic Exercise:  Pt would benefit from UE HEP to improve overall UE AROM/strength and can be further educated in next treatment session. Functional Measure:    [employer] AM-PACTM \"6 Clicks\"                                                       Daily Activity Inpatient Short Form  How much help from another person does the patient currently need. .. Total; A Lot A Little None   1. Putting on and taking off regular lower body clothing? []  1 []  2 [x]  3 []  4   2. Bathing (including washing, rinsing, drying)? []  1 []  2 [x]  3 []  4   3. Toileting, which includes using toilet, bedpan or urinal? [] 1 []  2 [x]  3 []  4   4. Putting on and taking off regular upper body clothing? []  1 []  2 [x]  3 []  4   5. Taking care of personal grooming such as brushing teeth? []  1 []  2 []  3 [x]  4   6. Eating meals?  []  1 [] 2 []  3 [x]  4   © , Trustees of [address] [place] [address] [address] [id number], under license to [employer]. All rights reserved     Score: 20/24     Interpretation of Tool:  Represents clinically-significant functional categories (i.e. Activities of daily living). Percentage of Impairment CH    0%   CI    1-19% CJ    20-39% CK    40-59% CL    60-79% CM    80-99% CN     100%   AMPA  Score 6-24 24 23 20-22 15-19 10-14 7-9 6     Occupational Therapy Evaluation Charge Determination   History Examination Decision-Making   LOW Complexity : Brief history review  LOW Complexity : 1-3 performance deficits relating to physical, cognitive , or psychosocial skils that result in activity limitations and / or participation restrictions  MEDIUM Complexity : Patient may present with comorbidities that affect occupational performnce. Miniml to moderate modification of tasks or assistance (eg, physical or verbal ) with assesment(s) is necessary to enable patient to complete evaluation       Based on the above components, the patient evaluation is determined to be of the following complexity level: LOW   Pain Ratin/10    Activity Tolerance:   Fair    After treatment patient left in no apparent distress:    Supine in bed, Call bell within reach, Bed / chair alarm activated, and Side rails x 3, bed locked and in lowest position    COMMUNICATION/EDUCATION:   The patients plan of care was discussed with: Physical therapist and Registered nurse. Patient/family have participated as able in goal setting and plan of care. and Patient/family agree to work toward stated goals and plan of care. This patients plan of care is appropriate for delegation to [facility]. Thank you for this referral.  Afshin Scott  Time Calculation: 31 mins   Problem: Self Care Deficits Care Plan (Adult)  Goal: *Acute Goals and Plan of Care (Insert Text)  Description: FUNCTIONAL STATUS PRIOR TO ADMISSION: Patient was independent and active without use of DME.  Patient was independent for basic and instrumental ADLs. HOME SUPPORT: The patient lived alone, daughter nearby. Pt states her daughter does not work and pt may potentially d/c to daughter's house to stay with her for a while.      Occupational Therapy Goals  Initiated 11/4/2022    Pt stated goal \"to go home\"  Pt will be IND sup <> sit in prep for EOB ADLs  Pt will be MI grooming standing sink side LRAD  Pt will be IND UB dressing sitting EOB/long sit   Pt will be MI LE dressing sitting EOB/long sit  Pt will be MI sit <>  prep for toileting LRAD  Pt will be IND toileting/toilet transfer/cloth mgmt LRAD  Pt will be IND following UE HEP in prep for self care tasks      Outcome: Not Met

## 2022-11-04 NOTE — PROGRESS NOTES
Tele Call:      Tele call at [phone] to notify nurse that \"patient looks like they might be going into a flutter\". Nurse requested strips be printed and brought over, patient assessed, and md on call notified. 12 lead EKG ordered per protocol. Patient asymptomatic, heart rate of 80 in Normal sinus per tele read out on strips and on monitor. 12 lead showed normal sinus with artifact. Tele and MD on call updated with results of 12lead. Will continue to monitor and follow plan of care for patient.  Will update with any further changes

## 2022-11-04 NOTE — PROGRESS NOTES
Comprehensive Nutrition Assessment    Type and Reason for Visit: Consult, Initial, Positive nutrition screen        Nutrition Recommendations/Plan:   Advance diet when medically appropriate to Puree   Consult SLP for diet texture/consistency   When able Restart Feeding via Peg (per home regimen) of Two Alok 237 ml   Bolus 4 x day   4. Flush tube with 180 ml water q 4 hours  This will provide 1896 kcal (100%), 79 g pro (85%), 1744 ml (97%)  Monitor diet advancement, initiation of enteral feeding, I/O's, weight        Malnutrition Assessment:  Malnutrition Status: Moderate malnutrition (11/04/22 1331)    Context:  Acute illness     Findings of the 6 clinical characteristics of malnutrition:   Energy Intake:  No significant decrease in energy intake (receives feeding via peg at home x 60d 100% needs)  Weight Loss:  Greater than 7.5% over 3 months (#, showing~>7.5% x 3m)     Body Fat Loss:  Mild body fat loss, Triceps   Muscle Mass Loss:  Mild muscle mass loss, Scapula (trapezius), Thigh (quadriceps), Hand (interosseous)  Fluid Accumulation:  No significant fluid accumulation,     Strength:  Not performed     Nutrition Assessment:    Pt admitted with hypokalemia and metabolic acidosis. Pt with increasing fatigue over 2 mo, n/v and weakness. Currently Npo. She does have a peg tube in place for most nutritional needs @ home s/t esophageal CA. Takes some foods, but limited. Reports weight loss but weight noted as stable since FT placement in September. Meds: Norvasc, Lovenox, Zofran PRN, Klor-con. Labs:  Na 147, K 2.6, Gluc 111, Alb 2.8, H/H 9.1/24.9. Nutrition Related Findings:    NFPE findings for Moderate malnutrition. No n/v/c/d. +PEG. Requires puree foods s/t esophageal ca, no coughing reported with liquids. No Bm recorded. No edema. + taste changes.  Wound Type: None    Current Nutrition Intake & Therapies:  DIET NPO    Anthropometric Measures:  Height: 5' 6\" (167.6 cm)  Ideal Body Weight (IBW): 130 lbs (59 kg)  Current Body Wt:  71.8 kg (158 lb 4.6 oz), 121.8 % IBW. Bed scale  Current BMI (kg/m2): 25.6  Weight Adjustment: No adjustment  BMI Category: Normal weight (BMI 22.0-24.9) age over 72      Estimated Daily Nutrient Needs:  Energy Requirements Based On: Kcal/kg  Weight Used for Energy Requirements: Current  Energy (kcal/day): 8244-6464 kcal (25-28 kcal/kg)  Protein (g/day):  g (1.3-1.4 g/kg, CA)  Method Used for Fluid Requirements: 1 ml/kcal  Fluid (ml/day): ~1800 ml        Nutrition Diagnosis: In context of acute illness or injury, Moderate malnutrition related to catabolic illness, other (specify) (esophageal cancer) as evidenced by Criteria as identified in malnutrition assessment, weight loss 7.5% in 3 months    Nutrition Interventions:   Food and/or Nutrient Delivery: Start oral diet, Start tube feeding  Nutrition Education/Counseling: No recommendations at this time  Coordination of Nutrition Care: Continue to monitor while inpatient, Speech therapy  Plan of Care discussed with: Pt    Goals:  Goals: Initiate nutrition support, Initiate PO diet, by next RD assessment       Nutrition Monitoring and Evaluation:   Behavioral-Environmental Outcomes: None identified  Food/Nutrient Intake Outcomes: Diet advancement/tolerance, Enteral nutrition intake/tolerance  Physical Signs/Symptoms Outcomes: Chewing or swallowing, Weight, Biochemical data    Discharge Planning:     Too soon to determine    Iris Boyd RD  Contact: [phone]

## 2022-11-04 NOTE — PROGRESS NOTES
Reason for Admission:   Hypokalemia                    RUR Score:     17%             PCP: First and Last name:   Asha Saldaña MD     Name of Practice:    Are you a current patient: Yes/No: yes   Approximate date of last visit: Sept, per patient   Can you participate in a virtual visit if needed: with assistance    Do you (patient/family) have any concerns for transition/discharge? None reported at this time              Plan for utilizing home health:   patient deciding between [facility] and SNF    Current Advanced Directive/Advance Care Plan:  Full Code      Healthcare Decision Maker:   Click here to complete [address] including selection of the Healthcare Decision Maker Relationship (ie \"Primary\")              Transition of Care Plan:      undecided at this time    CM met with patient and her sister in room to complete DCP assessment. Patient reported that she lives alone in a duplex [place] [address] in [facility], [facility] , patient reports that she has no DME, was independent in her ADL's prior to admission. Patient's daughter transports her to all her appointments, reports no issues obtaining her medications. CM discussed PT/OT recc of HH with 24/7 care vs SNF. Patient hesitant to make a decision, stated her daughter would allow her to come to her home, daughter does not work and can be home with the patient 24/7, but she was also considering SNF. CM provided patient with weekend CM contact info if she had questions, CM to follow up about DCP at a later time. CM continues to follow and monitor for needs.

## 2022-11-04 NOTE — CONSULTS
Renal Consult Note    Admit Date: 11/3/2022      HPI:   58-year-old unfortunate lady  was diagnosed with esophageal cancer earlier this year. This was then followed up by radiation therapy and intravenous chemo. She was admitted with potassium of 2 mEq and a bicarbonate of 12 with complains of weakness and shortness of breath. She was started on IV fluids with bicarbonate and potassium and today the potassium is 2.6 mEq and the bicarbonate is 19. Her renal function has been normal at 14 and 0.8 mg BUN and creatinine respectively. She is known to have hypertension and hyperlipidemia. She has intermittent diarrhea. She has been fed through PEG tube. She takes about 8 cans of TwoCal HN daily. Her last chemotherapy was in September. There is no history of nonsteroidal drug use. There is no history of renal stones. At present she feels weak. Her legs are heavy. She denies orthopnea or PND. She denies abdominal pain.     Current Facility-Administered Medications   Medication Dose Route Frequency    famotidine (PF) (PEPCID) 20 mg in 0.9% sodium chloride 10 mL injection  20 mg IntraVENous Q12H PRN    sodium chloride (NS) flush 5-40 mL  5-40 mL IntraVENous Q8H    sodium chloride (NS) flush 5-40 mL  5-40 mL IntraVENous PRN    acetaminophen (TYLENOL) tablet 650 mg  650 mg Oral Q6H PRN    Or    acetaminophen (TYLENOL) suppository 650 mg  650 mg Rectal Q6H PRN    polyethylene glycol (MIRALAX) packet 17 g  17 g Oral DAILY PRN    ondansetron (ZOFRAN ODT) tablet 4 mg  4 mg Oral Q8H PRN    Or    ondansetron (ZOFRAN) injection 4 mg  4 mg IntraVENous Q6H PRN    enoxaparin (LOVENOX) injection 40 mg  40 mg SubCUTAneous DAILY    amLODIPine (NORVASC) tablet 5 mg  5 mg Oral DAILY    atorvastatin (LIPITOR) tablet 20 mg  20 mg Oral DAILY    [Held by provider] lisinopriL (PRINIVIL, ZESTRIL) tablet 10 mg  10 mg Oral DAILY    dextrose 5% - 0.45% NaCl with KCl 20 mEq/L infusion  50 mL/hr IntraVENous CONTINUOUS    sodium bicarbonate (8.4%) 50 mEq in 0.45% sodium chloride 1,000 mL infusion   IntraVENous CONTINUOUS    pantoprazole (PROTONIX) 40 mg in 0.9% sodium chloride 10 mL injection  40 mg IntraVENous Q12H        Past Medical History:   Diagnosis Date    Cancer ([address]       Past Surgical History:   Procedure Laterality Date    HX CATARACT REMOVAL      HX HYSTERECTOMY      HX OOPHORECTOMY      IR CHOLECYSTOSTOMY PERCUTANEOUS      IR INSERT TUNL CVC W PORT OVER 5 YEARS  7/20/2022     Family History   Problem Relation Age of Onset    Breast Cancer Mother       Social History     Tobacco Use    Smoking status: Never    Smokeless tobacco: Never   Substance Use Topics    Alcohol use: Not Currently         Review of Systems    Review of Systems   Constitutional:  Negative for fever. Respiratory:  Negative for cough and shortness of breath. Gastrointestinal:  Negative for abdominal pain. Neurological:  Negative for headaches. Physical Exam:     Physical Exam  Cardiovascular:      Rate and Rhythm: Regular rhythm. Pulmonary:      Breath sounds: Normal breath sounds. Abdominal:      General: Bowel sounds are normal.      Palpations: Abdomen is soft. Comments: PEG tube present   Musculoskeletal:         General: No swelling. Neurological:      Mental Status: She is alert. Hyperpigmentation her on the neck since radiation therapy. No edema. Patient Vitals for the past 8 hrs:   BP Temp Pulse Resp SpO2 Height   11/04/22 1601 (!) 145/73 98.1 °F (36.7 °C) 85 18 100 % --   11/04/22 1150 -- -- -- -- -- 5' 6\" (1.676 m)     No intake/output data recorded. 11/02 1901 - 11/04 0700  In: -   Out: 450 [Urine:450]          XR CHEST PORT   Final Result   No Acute Disease.                Data Review   Recent Labs     11/04/22  0757 11/03/22  1208   WBC 5.8 8.0   HGB 9.1* 11.8   HCT 24.9* 33.0*   * 129*     Recent Labs     11/04/22  0757 11/03/22 2029 11/03/22  1426 11/03/22  1208   * 146*  --  145   K 2.6* 2.5*  --  2.0*   CL 118* 121*  --  115*   CO2 19* 13*  --  12*   * 126*  --  155*   BUN 14 5*  --  5*   CREA 0.80 0.88  --  1.35*   CA 8.6 8.9  --  9.9   MG 2.0  --  1.9  --    ALB 2.8*  --   --  3.6   ALT 8*  --   --  10*     No components found for: GLPOC  No results for input(s): PH, PCO2, PO2, HCO3, FIO2 in the last 72 hours. No results for input(s): INR, INREXT, INREXT in the last 72 hours. Assessment:           Active Problems:    Hypokalemia (95/8/5043)      Metabolic acidosis (32/7/3355)    Hypokalemia, and metabolic acidosis and acute kidney injury with a creatinine of 1.35 mg on admission. Suspect that this is due to diarrhea. Chemotherapy induced Fanconi syndrome is a possibility as well. I wonder if she received carboplatinum based chemotherapy. SEBAS secondary to volume contraction resolved. Hypertension, essential, under fairly good control. Anemia    Thrombocytopenia    Esophageal cancer s/p radiation and chemotherapy    Plan:   Check a phosphorus level  Discontinue bicarbonate infusion  IV D5 half-normal saline +20 mEq of potassium  Check urine electrolytes. Add  sodium bicarbonate via the PEG. Will follow electrolytes and renal function closely.   Thank you for this consult

## 2022-11-04 NOTE — PROGRESS NOTES
Problem: Mobility Impaired (Adult and Pediatric)  Goal: *Acute Goals and Plan of Care (Insert Text)  Description: FUNCTIONAL STATUS PRIOR TO ADMISSION: Patient was independent and active without use of DME.    HOME SUPPORT PRIOR TO ADMISSION: The patient lived  alone and did not require assist.    Physical Therapy Goals  Initiated 11/4/2022  Patient/family stated goal: I want to get stronger  1. Patient will move from supine to sit and sit to supine  in bed with modified independence within 7 day(s). 2.  Patient will transfer from bed to chair and chair to bed with modified independence using the least restrictive device within 7 day(s). 3.  Patient will perform sit to stand with modified independence within 7 day(s). 4.  Patient will ambulate with modified independence for 100 feet with the least restrictive device within 7 day(s). 5.  Patient will participate in lower extremity therapeutic exercise with independence  within 7 day(s). Outcome: Not Met    PHYSICAL THERAPY EVALUATION  Patient: Skyla Bernstein (10 y.o. female)  Date: 11/4/2022  Primary Diagnosis: Hypokalemia [N99.9]  Metabolic acidosis [G19.62]       Precautions: falls      ASSESSMENT  Pt is a 78 y.o. female with PMH of Esophageal CA and is s/p XRT and Chemo over past 2 months with increasing fatigue, n/v, came to [facility] with c/o persistent weakness, admitted on 11/3/2022 for hypokalemia and metabolic acidosis. Pt is A&O x 4, received semi-supine in bed, agreeable for PT evaluation. Based on current observations, pt  presents with deficits in generalized strength/AROM, balance in sitting and standing, functional activity tolerance, coordination impacting overall performance of functional transfers/mobility.  Pt  needs SBA for bed mobility, fair dynamic sitting balance , CGA/Lo for sit <>stand and CGA for bed>chair  transfers, good static  standing balance , is able to ambulate - 10' with RW, Lo with slow kelin and decreased step length b/l Le. Limited mobility sec to low potassium. Overall, pt tolerates session fair today and would benefit from  skilled PT services to address noted deficits and maximize IND/safety with functional transfers/mobility. Recommend d/c to SNF vs HHPT with 24 x 7 care pending caregiver availability when medically appropriate. Current Level of Function Impacting Discharge (mobility/balance): Pt requires CGA/Lo for functional transfers and Lo for ambulation with RW    Other factors to consider for discharge: Decline from functional baseline, lives alone, PLOF     PLAN :  Recommendations and Planned Interventions: bed mobility training, transfer training, gait training, therapeutic exercises, neuromuscular re-education, patient and family training/education, and therapeutic activities      Recommend for staff: Out of bed to chair for meals, Encourage HEP in prep for ADLs/mobility, Amb to bathroom for toileting with gt belt and AD, and Use of bed/chair alarm for safety    Frequency/Duration: Patient will be followed by physical therapy:  3-5x/week to address goals.     Recommendation for discharge: (in order for the patient to meet his/her long term goals)  Alex Richardson    This discharge recommendation:  Has been made in collaboration with the attending provider and/or case management    IF patient discharges home will need the following DME: rolling walker         SUBJECTIVE:   Patient stated  I feel weak    OBJECTIVE DATA SUMMARY:   HISTORY:    Past Medical History:   Diagnosis Date    Cancer ([facility] [address]      Past Surgical History:   Procedure Laterality Date    HX CATARACT REMOVAL      HX HYSTERECTOMY      HX OOPHORECTOMY      IR CHOLECYSTOSTOMY PERCUTANEOUS      IR INSERT TUNL CVC W PORT OVER 5 YEARS  7/20/2022       Home Situation  Home Environment: Apartment  # Steps to Enter: 3  Rails to Enter: Yes  Hand Rails : Bilateral  Wheelchair Ramp: No  One/Two Story Residence: Two story, live on 1st floor (duplex)  Living Alone: Yes  Support Systems: Child(arleen)  Patient Expects to be Discharged to[de-identified] Unable to determine at this time  Current DME Used/Available at Home: None    EXAMINATION/PRESENTATION/DECISION MAKING:   Critical Behavior:  Neurologic State: Alert  Orientation Level: Oriented X4  Cognition: Follows commands     Hearing: Auditory  Auditory Impairment: None  Skin:  intact where exposed    Range Of Motion:  AROM: Generally decreased, functional    Strength:    Strength: Generally decreased, functional    Tone & Sensation:   Tone: Normal    Sensation: Intact    Coordination:  Coordination: Generally decreased, functional    Functional Mobility:  Bed Mobility:  Rolling: Stand-by assistance  Supine to Sit: Stand-by assistance     Scooting: Stand-by assistance  Transfers:  Sit to Stand: Contact guard assistance;Minimum assistance  Stand to Sit: Contact guard assistance        Bed to Chair: Contact guard assistance    Balance:   Sitting: Impaired; Without support  Sitting - Static: Good (unsupported)  Sitting - Dynamic: Fair (occasional)  Standing: Impaired; With support  Standing - Static: Good;Constant support  Standing - Dynamic : Fair;Constant support  Ambulation/Gait Training:  Distance (ft): 10 Feet (ft)  Assistive Device: Walker, rolling;Gait belt  Ambulation - Level of Assistance: Minimal assistance    Gait Abnormalities: Decreased step clearance    Base of Support: Narrowed    Speed/Shelby: Slow  Step Length: Right shortened;Left shortened    Functional Measure:  Estefany Ovalle -PAC 6 Clicks         Basic Mobility Inpatient Short Form  How much difficulty does the patient currently have. .. Unable A Lot A Little None   1. Turning over in bed (including adjusting bedclothes, sheets and blankets)? [] 1   [] 2   [x] 3   [] 4   2. Sitting down on and standing up from a chair with arms ( e.g., wheelchair, bedside commode, etc.)   [] 1   [] 2   [x] 3   [] 4   3.   Moving from lying on back to sitting on the side of the bed? [] 1   [] 2   [x] 3   [] 4          How much help from another person does the patient currently need. .. Total A Lot A Little None   4. Moving to and from a bed to a chair (including a wheelchair)? [] 1   [] 2   [x] 3   [] 4   5. Need to walk in hospital room? [] 1   [] 2   [x] 3   [] 4   6. Climbing 3-5 steps with a railing? [] 1   [x] 2   [] 3   [] 4   © , Trustees of [facility], under license to [employer]. All rights reserved     Score:  Initial: 17 Most Recent: X (Date: 22 )   Interpretation of Tool:  Represents activities that are increasingly more difficult (i.e. Bed mobility, Transfers, Gait). Score 24 23 22-20 19-15 14-10 9-7 6   Modifier CH CI CJ CK CL CM CN         Physical Therapy Evaluation Charge Determination   History Examination Presentation Decision-Making   MEDIUM  Complexity : 1-2 comorbidities / personal factors will impact the outcome/ POC  MEDIUM Complexity : 3 Standardized tests and measures addressing body structure, function, activity limitation and / or participation in recreation  LOW Complexity : Stable, uncomplicated  Other outcome measures [facility] 6  medium complexity      Based on the above components, the patient evaluation is determined to be of the following complexity level: LOW     Pain Ratin/10     Activity Tolerance:   Fair    After treatment patient left in no apparent distress:    Sitting in chair, Call bell within reach, and Bed / chair alarm activated and nsg updated. COMMUNICATION/EDUCATION:   The patients plan of care was discussed with: Registered nurse. Fall prevention education was provided and the patient/caregiver indicated understanding. and Patient/family agree to work toward stated goals and plan of care.          Thank you for this referral.  Tessa De La Paz, PT   Time Calculation: 36 mins

## 2022-11-05 LAB
ALBUMIN SERPL-MCNC: 2.7 G/DL (ref 3.5–5)
ANION GAP SERPL CALC-SCNC: 5 MMOL/L (ref 5–15)
BASOPHILS # BLD: 0 K/UL (ref 0–0.1)
BASOPHILS NFR BLD: 0 % (ref 0–1)
BUN SERPL-MCNC: 9 MG/DL (ref 6–20)
BUN/CREAT SERPL: 15 (ref 12–20)
CA-I BLD-MCNC: 8.4 MG/DL (ref 8.5–10.1)
CHLORIDE SERPL-SCNC: 116 MMOL/L (ref 97–108)
CO2 SERPL-SCNC: 27 MMOL/L (ref 21–32)
CREAT SERPL-MCNC: 0.62 MG/DL (ref 0.55–1.02)
CREAT UR-MCNC: 62 MG/DL
DIFFERENTIAL METHOD BLD: ABNORMAL
EOSINOPHIL # BLD: 0 K/UL (ref 0–0.4)
EOSINOPHIL NFR BLD: 0 % (ref 0–7)
ERYTHROCYTE [DISTWIDTH] IN BLOOD BY AUTOMATED COUNT: 20.5 % (ref 11.5–14.5)
GLUCOSE SERPL-MCNC: 102 MG/DL (ref 65–100)
HCT VFR BLD AUTO: 24.8 % (ref 35–47)
HGB BLD-MCNC: 8.6 G/DL (ref 11.5–16)
IMM GRANULOCYTES # BLD AUTO: 0 K/UL (ref 0–0.04)
IMM GRANULOCYTES NFR BLD AUTO: 0 % (ref 0–0.5)
LYMPHOCYTES # BLD: 0.5 K/UL (ref 0.8–3.5)
LYMPHOCYTES NFR BLD: 15 % (ref 12–49)
MAGNESIUM SERPL-MCNC: 1.9 MG/DL (ref 1.6–2.4)
MCH RBC QN AUTO: 32.5 PG (ref 26–34)
MCHC RBC AUTO-ENTMCNC: 34.7 G/DL (ref 30–36.5)
MCV RBC AUTO: 93.6 FL (ref 80–99)
MONOCYTES # BLD: 0.3 K/UL (ref 0–1)
MONOCYTES NFR BLD: 9 % (ref 5–13)
NEUTS SEG # BLD: 2.6 K/UL (ref 1.8–8)
NEUTS SEG NFR BLD: 76 % (ref 32–75)
NRBC # BLD: 0 K/UL (ref 0–0.01)
NRBC BLD-RTO: 0 PER 100 WBC
PHOSPHATE SERPL-MCNC: 1.2 MG/DL (ref 2.6–4.7)
PLATELET # BLD AUTO: 91 K/UL (ref 150–400)
PMV BLD AUTO: 11 FL (ref 8.9–12.9)
POTASSIUM SERPL-SCNC: 2.6 MMOL/L (ref 3.5–5.1)
POTASSIUM UR-SCNC: 15 MMOL/L
RBC # BLD AUTO: 2.65 M/UL (ref 3.8–5.2)
SODIUM SERPL-SCNC: 148 MMOL/L (ref 136–145)
WBC # BLD AUTO: 3.5 K/UL (ref 3.6–11)

## 2022-11-05 PROCEDURE — 65270000029 HC RM PRIVATE

## 2022-11-05 PROCEDURE — 83735 ASSAY OF MAGNESIUM: CPT

## 2022-11-05 PROCEDURE — 74011250636 HC RX REV CODE- 250/636: Performed by: INTERNAL MEDICINE

## 2022-11-05 PROCEDURE — 74011250637 HC RX REV CODE- 250/637: Performed by: INTERNAL MEDICINE

## 2022-11-05 PROCEDURE — 74011000250 HC RX REV CODE- 250: Performed by: INTERNAL MEDICINE

## 2022-11-05 PROCEDURE — 85025 COMPLETE CBC W/AUTO DIFF WBC: CPT

## 2022-11-05 PROCEDURE — 84133 ASSAY OF URINE POTASSIUM: CPT

## 2022-11-05 PROCEDURE — 80069 RENAL FUNCTION PANEL: CPT

## 2022-11-05 PROCEDURE — 82570 ASSAY OF URINE CREATININE: CPT

## 2022-11-05 PROCEDURE — C9113 INJ PANTOPRAZOLE SODIUM, VIA: HCPCS | Performed by: INTERNAL MEDICINE

## 2022-11-05 RX ORDER — POTASSIUM CHLORIDE 1.5 G/1.77G
40 POWDER, FOR SOLUTION ORAL EVERY 6 HOURS
Status: COMPLETED | OUTPATIENT
Start: 2022-11-05 | End: 2022-11-05

## 2022-11-05 RX ADMIN — SODIUM BICARBONATE 650 MG: 650 TABLET ORAL at 09:25

## 2022-11-05 RX ADMIN — SODIUM BICARBONATE 650 MG: 650 TABLET ORAL at 21:00

## 2022-11-05 RX ADMIN — AMLODIPINE BESYLATE 5 MG: 5 TABLET ORAL at 09:25

## 2022-11-05 RX ADMIN — POTASSIUM CHLORIDE 40 MEQ: 1.5 FOR SOLUTION ORAL at 16:51

## 2022-11-05 RX ADMIN — SODIUM CHLORIDE, PRESERVATIVE FREE 40 MG: 5 INJECTION INTRAVENOUS at 20:58

## 2022-11-05 RX ADMIN — ATORVASTATIN CALCIUM 20 MG: 20 TABLET, FILM COATED ORAL at 09:25

## 2022-11-05 RX ADMIN — DEXTROSE MONOHYDRATE, SODIUM CHLORIDE, AND POTASSIUM CHLORIDE 50 ML/HR: 50; 4.5; 1.49 INJECTION, SOLUTION INTRAVENOUS at 20:57

## 2022-11-05 RX ADMIN — SODIUM CHLORIDE, PRESERVATIVE FREE 10 ML: 5 INJECTION INTRAVENOUS at 14:07

## 2022-11-05 RX ADMIN — SODIUM BICARBONATE 650 MG: 650 TABLET ORAL at 16:52

## 2022-11-05 RX ADMIN — SODIUM CHLORIDE, PRESERVATIVE FREE 10 ML: 5 INJECTION INTRAVENOUS at 21:01

## 2022-11-05 RX ADMIN — ENOXAPARIN SODIUM 40 MG: 100 INJECTION SUBCUTANEOUS at 09:25

## 2022-11-05 RX ADMIN — POTASSIUM PHOSPHATE, MONOBASIC 500 MG: 500 TABLET, SOLUBLE ORAL at 21:00

## 2022-11-05 RX ADMIN — SODIUM CHLORIDE, PRESERVATIVE FREE 10 ML: 5 INJECTION INTRAVENOUS at 05:27

## 2022-11-05 RX ADMIN — POTASSIUM CHLORIDE 40 MEQ: 1.5 FOR SOLUTION ORAL at 22:19

## 2022-11-05 RX ADMIN — SODIUM CHLORIDE, PRESERVATIVE FREE 40 MG: 5 INJECTION INTRAVENOUS at 09:25

## 2022-11-05 NOTE — PROGRESS NOTES
Problem: Falls - Risk of  Goal: *Absence of Falls  Description: Document Georgina Daly Fall Risk and appropriate interventions in the flowsheet.   Note: Fall Risk Interventions:  Mobility Interventions: Assess mobility with egress test, Bed/chair exit alarm         Medication Interventions: Assess postural VS orthostatic hypotension, Bed/chair exit alarm    Elimination Interventions: Bed/chair exit alarm, Call light in reach              Problem: Patient Education: Go to Patient Education Activity  Goal: Patient/Family Education  Outcome: Progressing Towards Goal

## 2022-11-05 NOTE — PROGRESS NOTES
Hospitalist Progress Note    Daily Progress Note: 11/5/2022 2:10 PM    Assessment/Plan     78 AA Female with Esophageal and has completed XRT + Chemo with:     # Critical Life-threatening Hypokalemia  # SEBAS with Metabolic Acidosis; resolved  # Hx HTN  # N/V     => S/p IVF with NaHCO3  => Repeat labs; cont replacing K+  => Sx control  => C/s Renal/Damodar following  => C/s GI/[place] --> for endoscopy Monday 11/7/22  => Hold ACE-I  => Reviewed home meds in Epic     Disposition Status: Continue Inpatient Mgmt; SNF   DVT Prophylaxis: Lovenox  GI Prophylaxis: Protonix  Code Status: FULL  POA: Self/Daughter  Advanced Directive Discussion: N/A  -----------------------------------------------------    Admission Hx/HPI:  Ellis Lopez is a 78 y.o. female who follows with Dr. Kendy Montemayor for Esophageal CA and is s/p XRT and Chemo over past 2 months with increasing fatigue, n/v. Outpatient w/up showed low potassium for which she was prescribed replacement and she took. However, d/t persistent weakness, she came to ER. She is accompanied by her daughter at bedside. Patient lives by herself. + Sob, cough. No definite fever. In ER, K+ was 2.0 with CO2 of 12. Replacement as started. ----------------------------------------    Subjective:  Continues to feel well. Ate a little last night orally. No active nausea. No recurrent vomiting. K still low, CO2 corrected. Per Nutrition:    Advance diet when medically appropriate to Puree   Consult SLP for diet texture/consistency   When able Restart Feeding via Peg (per home regimen) of Two Alok 237 ml   Bolus 4 x day   4.   Flush tube with 180 ml water q 4 hours  This will provide 1896 kcal (100%), 79 g pro (85%), 1744 ml (97%)  Monitor diet advancement, initiation of enteral feeding, I/O's, weight     Per Physical Therapist: [facility]      Review of Systems    Constitutional: No fevers, No chills, No night sweats, + fatigue/weakness, No significant weight change  Eyes: No visual disturbance, No loss of vision  HENT: No nasal congestion, No sore throat, No head lesion, No hearing deficit  Respiratory: No cough, No sputum, No wheezing, No SOB, No hemoptysis, No pleuritic CP  Cardiovascular: No chest pain, No lower extremity edema, No palpitations, No PND, No orthopnea, No JIANG  Gastrointestinal: + nausea/vomiting, No diarrhea, No constipation, No abdominal pain, No Melena, No hematemesis, No BRBPR.  PEG  Genitourinary: No frequency, No dysuria, No hematuria, No discharge  Integument: No rash, No new skin lesion(s), No dryness, No new palpable nodule  Musculoskeletal: No arthralgias, No neck pain, No back pain, No joint pain, No fall or injury  Neurological: No headaches, No dizziness, No confusion,  No seizures, No focal weakness, No LOC, No paresthesia  Heme/Onc: No overt bleeding noted, No obvious swollen glands  Behavioral/Psychiatric: No change in mood; no SI; no hallucination      Current Facility-Administered Medications   Medication Dose Route Frequency    famotidine (PF) (PEPCID) 20 mg in 0.9% sodium chloride 10 mL injection  20 mg IntraVENous Q12H PRN    sodium bicarbonate tablet 650 mg  650 mg Per G Tube TID    prochlorperazine (COMPAZINE) injection 10 mg  10 mg IntraVENous Q6H PRN    sodium chloride (NS) flush 5-40 mL  5-40 mL IntraVENous Q8H    sodium chloride (NS) flush 5-40 mL  5-40 mL IntraVENous PRN    acetaminophen (TYLENOL) tablet 650 mg  650 mg Oral Q6H PRN    Or    acetaminophen (TYLENOL) suppository 650 mg  650 mg Rectal Q6H PRN    polyethylene glycol (MIRALAX) packet 17 g  17 g Oral DAILY PRN    ondansetron (ZOFRAN ODT) tablet 4 mg  4 mg Oral Q8H PRN    Or    ondansetron (ZOFRAN) injection 4 mg  4 mg IntraVENous Q6H PRN    enoxaparin (LOVENOX) injection 40 mg  40 mg SubCUTAneous DAILY    amLODIPine (NORVASC) tablet 5 mg  5 mg Oral DAILY    atorvastatin (LIPITOR) tablet 20 mg  20 mg Oral DAILY    [Held by provider] lisinopriL (PRINIVIL, ZESTRIL) tablet 10 mg  10 mg Oral DAILY    dextrose 5% - 0.45% NaCl with KCl 20 mEq/L infusion  50 mL/hr IntraVENous CONTINUOUS    pantoprazole (PROTONIX) 40 mg in 0.9% sodium chloride 10 mL injection  40 mg IntraVENous Q12H       Objective     Visit Vitals  /68 (BP 1 Location: Left upper arm, BP Patient Position: Supine)   Pulse 77   Temp 99.3 °F (37.4 °C)   Resp 18   Ht 5' 6\" (1.676 m)   Wt 71.8 kg (158 lb 4.8 oz)   SpO2 100%   Breastfeeding No   BMI 25.55 kg/m²      O2 Device: None (Room air)    Temp (24hrs), Av.6 °F (37 °C), Min:98 °F (36.7 °C), Max:99.3 °F (37.4 °C)      No intake/output data recorded.  1901 -  0700  In: -   Out: 950 [Urine:950]      PHYSICAL EXAM    General: Awake and responsive, NAD; sitting in chair  Neck: Supple, radiation scarring and inflammatory changes  Vascular: No carotid bruit, palpable pulses bilat  Lung: CTA bilat, vesicular breath sounds  Heart: S1S2, No significant murmur appreciated; Sinus on Tele  Abdomen: Soft, NT, No rigidity, No rebound, Bowel sounds +; PEG in place  Extremities: No edema  M/S: No traumatic change, active mobility noted  Skin: Keloid scar upper chest  Neurologic: No overt focal motor deficit. AxOx3.    Psychiatric: Coherent and age appropriate affect      Data Review    Recent Results (from the past 24 hour(s))   METABOLIC PANEL, BASIC    Collection Time: 22  3:26 PM   Result Value Ref Range    Sodium 147 (H) 136 - 145 mmol/L    Potassium 2.8 (L) 3.5 - 5.1 mmol/L    Chloride 117 (H) 97 - 108 mmol/L    CO2 23 21 - 32 mmol/L    Anion gap 7 5 - 15 mmol/L    Glucose 95 65 - 100 mg/dL    BUN 14 6 - 20 mg/dL    Creatinine 0.82 0.55 - 1.02 mg/dL    BUN/Creatinine ratio 17 12 - 20      eGFR >60 >60 ml/min/1.73m2    Calcium 8.7 8.5 - 10.1 mg/dL   POTASSIUM, UR, RANDOM    Collection Time: 22 12:25 AM   Result Value Ref Range    Potassium urine, random 15 mmol/L   CREATININE, UR, RANDOM    Collection Time: 22 12:25 AM   Result Value Ref Range    Creatinine, urine random 62.00 mg/dL RENAL FUNCTION PANEL    Collection Time: 11/05/22  1:11 PM   Result Value Ref Range    Sodium 148 (H) 136 - 145 mmol/L    Potassium 2.6 (LL) 3.5 - 5.1 mmol/L    Chloride 116 (H) 97 - 108 mmol/L    CO2 27 21 - 32 mmol/L    Anion gap 5 5 - 15 mmol/L    Glucose 102 (H) 65 - 100 mg/dL    BUN 9 6 - 20 mg/dL    Creatinine 0.62 0.55 - 1.02 mg/dL    BUN/Creatinine ratio 15 12 - 20      eGFR >60 >60 ml/min/1.73m2    Calcium 8.4 (L) 8.5 - 10.1 mg/dL    Phosphorus 1.2 (L) 2.6 - 4.7 mg/dL    Albumin 2.7 (L) 3.5 - 5.0 g/dL   MAGNESIUM    Collection Time: 11/05/22  1:11 PM   Result Value Ref Range    Magnesium 1.9 1.6 - 2.4 mg/dL   CBC WITH AUTOMATED DIFF    Collection Time: 11/05/22  1:11 PM   Result Value Ref Range    WBC 3.5 (L) 3.6 - 11.0 K/uL    RBC 2.65 (L) 3.80 - 5.20 M/uL    HGB 8.6 (L) 11.5 - 16.0 g/dL    HCT 24.8 (L) 35.0 - 47.0 %    MCV 93.6 80.0 - 99.0 FL    MCH 32.5 26.0 - 34.0 PG    MCHC 34.7 30.0 - 36.5 g/dL    RDW 20.5 (H) 11.5 - 14.5 %    PLATELET 91 (L) 295 - 400 K/uL    MPV 11.0 8.9 - 12.9 FL    NRBC 0.0 0.0  WBC    ABSOLUTE NRBC 0.00 0.00 - 0.01 K/uL    NEUTROPHILS 76 (H) 32 - 75 %    LYMPHOCYTES 15 12 - 49 %    MONOCYTES 9 5 - 13 %    EOSINOPHILS 0 0 - 7 %    BASOPHILS 0 0 - 1 %    IMMATURE GRANULOCYTES 0 0 - 0.5 %    ABS. NEUTROPHILS 2.6 1.8 - 8.0 K/UL    ABS. LYMPHOCYTES 0.5 (L) 0.8 - 3.5 K/UL    ABS. MONOCYTES 0.3 0.0 - 1.0 K/UL    ABS. EOSINOPHILS 0.0 0.0 - 0.4 K/UL    ABS. BASOPHILS 0.0 0.0 - 0.1 K/UL    ABS. IMM.  GRANS. 0.0 0.00 - 0.04 K/UL    DF AUTOMATED         XR CHEST PORT   Final Result   No Acute Disease.              ________________________________________  Ju Elaine MD

## 2022-11-05 NOTE — PROGRESS NOTES
Pt has been compliant and stabled throughout the day. Meds given per MAR. POC was gone over with pt. Meds were administered through Pts PEG tube, she was able to tolerate it well. Vitals have been stabled. K+ has been treated with k+ medication. Call bell within reach, standard fall precautions implemented. Problem: Falls - Risk of  Goal: *Absence of Falls  Description: Document [place] Fall Risk and appropriate interventions in the flowsheet. Outcome: Progressing Towards Goal  Note: Fall Risk Interventions:  Mobility Interventions: Bed/chair exit alarm         Medication Interventions: Patient to call before getting OOB    Elimination Interventions: Bed/chair exit alarm, Call light in reach              Problem: Patient Education: Go to Patient Education Activity  Goal: Patient/Family Education  Outcome: Progressing Towards Goal     Problem: Pressure Injury - Risk of  Goal: *Prevention of pressure injury  Description: Document Wesly Scale and appropriate interventions in the flowsheet.   Outcome: Progressing Towards Goal  Note: Pressure Injury Interventions:  Sensory Interventions: Assess changes in LOC    Moisture Interventions: Absorbent underpads    Activity Interventions: Increase time out of bed    Mobility Interventions: PT/OT evaluation    Nutrition Interventions: Document food/fluid/supplement intake                     Problem: Patient Education: Go to Patient Education Activity  Goal: Patient/Family Education  Outcome: Progressing Towards Goal     Problem: Aspiration - Risk of  Goal: *Absence of aspiration  Outcome: Progressing Towards Goal     Problem: Patient Education: Go to Patient Education Activity  Goal: Patient/Family Education  Outcome: Progressing Towards Goal     Problem: Nausea/Vomiting (Adult)  Goal: *Absence of nausea/vomiting  Outcome: Progressing Towards Goal  Goal: *Palliation of nausea/vomiting (Palliative Care)  Outcome: Progressing Towards Goal     Problem: Patient Education: Go to Patient Education Activity  Goal: Patient/Family Education  Outcome: Progressing Towards Goal     Problem: Patient Education: Go to Patient Education Activity  Goal: Patient/Family Education  Outcome: Progressing Towards Goal     Problem: Patient Education: Go to Patient Education Activity  Goal: Patient/Family Education  Outcome: Progressing Towards Goal

## 2022-11-05 NOTE — PROGRESS NOTES
Renal Consult Note    Admit Date: 11/3/2022      HPI:   Seen and examined follow-up presents accompanied by daughter. States that she is feeling better. Mostly in bed. Generalized weakness. Not nauseous. No diarrhea reported.     Current Facility-Administered Medications   Medication Dose Route Frequency    famotidine (PF) (PEPCID) 20 mg in 0.9% sodium chloride 10 mL injection  20 mg IntraVENous Q12H PRN    sodium bicarbonate tablet 650 mg  650 mg Per G Tube TID    prochlorperazine (COMPAZINE) injection 10 mg  10 mg IntraVENous Q6H PRN    sodium chloride (NS) flush 5-40 mL  5-40 mL IntraVENous Q8H    sodium chloride (NS) flush 5-40 mL  5-40 mL IntraVENous PRN    acetaminophen (TYLENOL) tablet 650 mg  650 mg Oral Q6H PRN    Or    acetaminophen (TYLENOL) suppository 650 mg  650 mg Rectal Q6H PRN    polyethylene glycol (MIRALAX) packet 17 g  17 g Oral DAILY PRN    ondansetron (ZOFRAN ODT) tablet 4 mg  4 mg Oral Q8H PRN    Or    ondansetron (ZOFRAN) injection 4 mg  4 mg IntraVENous Q6H PRN    enoxaparin (LOVENOX) injection 40 mg  40 mg SubCUTAneous DAILY    amLODIPine (NORVASC) tablet 5 mg  5 mg Oral DAILY    atorvastatin (LIPITOR) tablet 20 mg  20 mg Oral DAILY    [Held by provider] lisinopriL (PRINIVIL, ZESTRIL) tablet 10 mg  10 mg Oral DAILY    dextrose 5% - 0.45% NaCl with KCl 20 mEq/L infusion  50 mL/hr IntraVENous CONTINUOUS    pantoprazole (PROTONIX) 40 mg in 0.9% sodium chloride 10 mL injection  40 mg IntraVENous Q12H        Past Medical History:   Diagnosis Date    Cancer ([facility] [address]       Past Surgical History:   Procedure Laterality Date    HX CATARACT REMOVAL      HX HYSTERECTOMY      HX OOPHORECTOMY      IR CHOLECYSTOSTOMY PERCUTANEOUS      IR INSERT TUNL CVC W PORT OVER 5 YEARS  7/20/2022     Family History   Problem Relation Age of Onset    Breast Cancer Mother       Social History     Tobacco Use    Smoking status: Never    Smokeless tobacco: Never   Substance Use Topics    Alcohol use: Not Currently Review of Systems    Review of Systems   Constitutional:  Negative for fever. Respiratory:  Negative for cough and shortness of breath. Gastrointestinal:  Negative for abdominal pain. Neurological:  Negative for headaches. Physical Exam:     Physical Exam  Cardiovascular:      Rate and Rhythm: Regular rhythm. Pulmonary:      Breath sounds: Normal breath sounds. Abdominal:      General: Bowel sounds are normal.      Palpations: Abdomen is soft. Comments: PEG tube present   Musculoskeletal:         General: No swelling. Neurological:      Mental Status: She is alert. Hyperpigmentation her on the neck since radiation therapy. No edema. Next  Alert oriented x3  No neuropsych abnormalities    Patient Vitals for the past 8 hrs:   BP Temp Pulse Resp SpO2   11/05/22 0913 115/68 99.3 °F (37.4 °C) 77 18 100 %   11/05/22 0800 -- -- 78 -- --       No intake/output data recorded. 11/03 1901 - 11/05 0700  In: -   Out: 950 [Urine:950]          XR CHEST PORT   Final Result   No Acute Disease. Data Review   Recent Labs     11/04/22  0757 11/03/22  1208   WBC 5.8 8.0   HGB 9.1* 11.8   HCT 24.9* 33.0*   * 129*       Recent Labs     11/04/22  1526 11/04/22  0757 11/03/22  2029 11/03/22  1426 11/03/22  1208   * 147* 146*  --  145   K 2.8* 2.6* 2.5*  --  2.0*   * 118* 121*  --  115*   CO2 23 19* 13*  --  12*   GLU 95 111* 126*  --  155*   BUN 14 14 5*  --  5*   CREA 0.82 0.80 0.88  --  1.35*   CA 8.7 8.6 8.9  --  9.9   MG  --  2.0  --  1.9  --    ALB  --  2.8*  --   --  3.6   ALT  --  8*  --   --  10*       No components found for: GLPOC  No results for input(s): PH, PCO2, PO2, HCO3, FIO2 in the last 72 hours. No results for input(s): INR, INREXT, INREXT, INREXT in the last 72 hours. Assessment:           Active Problems:    Hypokalemia (23/8/8946)      Metabolic acidosis (83/5/3734)  Hypokalemia, moderate - being corrected.     metabolic acidosis and acute kidney injury with a creatinine of 1.35 mg on admission.-Resolved ever since; unclear etiology not yet elucidated. Still with severe hypokalemia  Hypernatremia      Hypertension, essential, under fairly good control. Anemia    Thrombocytopenia    Esophageal cancer s/p radiation and chemotherapy    Plan:   Continue with current plan. Replace potassium. Acidosis resolved. If acidosis develops again, obtain UA and urine electrolytes concurrent with renal function panel. That way we can evaluate for possible Fanconi's.   Thank you for this consult

## 2022-11-05 NOTE — PROGRESS NOTES
Bedside shift change report given to OSCAR (oncoming nurse) by Ap Torres (offgoing nurse). Report included the following information SBAR, Kardex, MAR, and Recent Results.

## 2022-11-05 NOTE — PROGRESS NOTES
Bedside and Verbal shift change report given to Rajiv Shearer RN (oncoming nurse) by Eulalia Owens RN (offgoing nurse). Report included the following information SBAR, Kardex, and MAR.

## 2022-11-06 LAB
ANION GAP SERPL CALC-SCNC: 5 MMOL/L (ref 5–15)
BASOPHILS # BLD: 0 K/UL (ref 0–0.1)
BASOPHILS NFR BLD: 0 % (ref 0–1)
BUN SERPL-MCNC: 7 MG/DL (ref 6–20)
BUN/CREAT SERPL: 15 (ref 12–20)
CA-I BLD-MCNC: 8.1 MG/DL (ref 8.5–10.1)
CHLORIDE SERPL-SCNC: 115 MMOL/L (ref 97–108)
CO2 SERPL-SCNC: 27 MMOL/L (ref 21–32)
CREAT SERPL-MCNC: 0.46 MG/DL (ref 0.55–1.02)
DIFFERENTIAL METHOD BLD: ABNORMAL
EOSINOPHIL # BLD: 0 K/UL (ref 0–0.4)
EOSINOPHIL NFR BLD: 0 % (ref 0–7)
ERYTHROCYTE [DISTWIDTH] IN BLOOD BY AUTOMATED COUNT: 20.9 % (ref 11.5–14.5)
GLUCOSE SERPL-MCNC: 102 MG/DL (ref 65–100)
HCT VFR BLD AUTO: 27.3 % (ref 35–47)
HGB BLD-MCNC: 9.3 G/DL (ref 11.5–16)
IMM GRANULOCYTES # BLD AUTO: 0 K/UL (ref 0–0.04)
IMM GRANULOCYTES NFR BLD AUTO: 1 % (ref 0–0.5)
LYMPHOCYTES # BLD: 0.6 K/UL (ref 0.8–3.5)
LYMPHOCYTES NFR BLD: 19 % (ref 12–49)
MAGNESIUM SERPL-MCNC: 1.7 MG/DL (ref 1.6–2.4)
MCH RBC QN AUTO: 32.9 PG (ref 26–34)
MCHC RBC AUTO-ENTMCNC: 34.1 G/DL (ref 30–36.5)
MCV RBC AUTO: 96.5 FL (ref 80–99)
MONOCYTES # BLD: 0.4 K/UL (ref 0–1)
MONOCYTES NFR BLD: 11 % (ref 5–13)
NEUTS SEG # BLD: 2.1 K/UL (ref 1.8–8)
NEUTS SEG NFR BLD: 69 % (ref 32–75)
NRBC # BLD: 0 K/UL (ref 0–0.01)
NRBC BLD-RTO: 0 PER 100 WBC
PHOSPHATE SERPL-MCNC: 1.6 MG/DL (ref 2.6–4.7)
PLATELET # BLD AUTO: 85 K/UL (ref 150–400)
PMV BLD AUTO: 10.7 FL (ref 8.9–12.9)
POTASSIUM SERPL-SCNC: 2.9 MMOL/L (ref 3.5–5.1)
RBC # BLD AUTO: 2.83 M/UL (ref 3.8–5.2)
SODIUM SERPL-SCNC: 147 MMOL/L (ref 136–145)
WBC # BLD AUTO: 3.2 K/UL (ref 3.6–11)

## 2022-11-06 PROCEDURE — 74011250636 HC RX REV CODE- 250/636: Performed by: INTERNAL MEDICINE

## 2022-11-06 PROCEDURE — 83735 ASSAY OF MAGNESIUM: CPT

## 2022-11-06 PROCEDURE — 74011250637 HC RX REV CODE- 250/637: Performed by: INTERNAL MEDICINE

## 2022-11-06 PROCEDURE — 80048 BASIC METABOLIC PNL TOTAL CA: CPT

## 2022-11-06 PROCEDURE — 84100 ASSAY OF PHOSPHORUS: CPT

## 2022-11-06 PROCEDURE — 74011000250 HC RX REV CODE- 250: Performed by: INTERNAL MEDICINE

## 2022-11-06 PROCEDURE — 85025 COMPLETE CBC W/AUTO DIFF WBC: CPT

## 2022-11-06 PROCEDURE — C9113 INJ PANTOPRAZOLE SODIUM, VIA: HCPCS | Performed by: INTERNAL MEDICINE

## 2022-11-06 PROCEDURE — 36415 COLL VENOUS BLD VENIPUNCTURE: CPT

## 2022-11-06 PROCEDURE — 65270000029 HC RM PRIVATE

## 2022-11-06 RX ORDER — POTASSIUM CHLORIDE 1.5 G/1.77G
40 POWDER, FOR SOLUTION ORAL
Status: COMPLETED | OUTPATIENT
Start: 2022-11-06 | End: 2022-11-06

## 2022-11-06 RX ORDER — UREA 10 %
1 LOTION (ML) TOPICAL 3 TIMES DAILY
Status: DISCONTINUED | OUTPATIENT
Start: 2022-11-06 | End: 2022-11-11 | Stop reason: HOSPADM

## 2022-11-06 RX ORDER — PANTOPRAZOLE SODIUM 40 MG/1
40 GRANULE, DELAYED RELEASE ORAL 2 TIMES DAILY
Status: DISCONTINUED | OUTPATIENT
Start: 2022-11-06 | End: 2022-11-08

## 2022-11-06 RX ADMIN — SODIUM CHLORIDE, PRESERVATIVE FREE 10 ML: 5 INJECTION INTRAVENOUS at 23:36

## 2022-11-06 RX ADMIN — PANTOPRAZOLE SODIUM 40 MG: 40 GRANULE, DELAYED RELEASE ORAL at 22:41

## 2022-11-06 RX ADMIN — SODIUM BICARBONATE 650 MG: 650 TABLET ORAL at 22:42

## 2022-11-06 RX ADMIN — SODIUM CHLORIDE, PRESERVATIVE FREE 40 MG: 5 INJECTION INTRAVENOUS at 08:47

## 2022-11-06 RX ADMIN — POTASSIUM CHLORIDE 40 MEQ: 1.5 POWDER, FOR SOLUTION ORAL at 15:14

## 2022-11-06 RX ADMIN — SODIUM CHLORIDE, PRESERVATIVE FREE 10 ML: 5 INJECTION INTRAVENOUS at 05:13

## 2022-11-06 RX ADMIN — AMLODIPINE BESYLATE 5 MG: 5 TABLET ORAL at 08:47

## 2022-11-06 RX ADMIN — Medication 27 MG: at 22:42

## 2022-11-06 RX ADMIN — POTASSIUM PHOSPHATE, MONOBASIC 500 MG: 500 TABLET, SOLUBLE ORAL at 15:14

## 2022-11-06 RX ADMIN — SODIUM BICARBONATE 650 MG: 650 TABLET ORAL at 08:47

## 2022-11-06 RX ADMIN — POTASSIUM PHOSPHATE, MONOBASIC 500 MG: 500 TABLET, SOLUBLE ORAL at 22:00

## 2022-11-06 RX ADMIN — ATORVASTATIN CALCIUM 20 MG: 20 TABLET, FILM COATED ORAL at 08:47

## 2022-11-06 RX ADMIN — POTASSIUM PHOSPHATE, MONOBASIC 500 MG: 500 TABLET, SOLUBLE ORAL at 10:51

## 2022-11-06 RX ADMIN — ENOXAPARIN SODIUM 40 MG: 100 INJECTION SUBCUTANEOUS at 08:47

## 2022-11-06 RX ADMIN — Medication 27 MG: at 15:14

## 2022-11-06 RX ADMIN — SODIUM CHLORIDE, PRESERVATIVE FREE 10 ML: 5 INJECTION INTRAVENOUS at 14:24

## 2022-11-06 RX ADMIN — SODIUM BICARBONATE 650 MG: 650 TABLET ORAL at 15:14

## 2022-11-06 NOTE — PROGRESS NOTES
Problem: Falls - Risk of  Goal: *Absence of Falls  Description: Document Dot Kennedy Fall Risk and appropriate interventions in the flowsheet.   Outcome: Progressing Towards Goal  Note: Fall Risk Interventions:  Mobility Interventions: Bed/chair exit alarm         Medication Interventions: Bed/chair exit alarm, Patient to call before getting OOB    Elimination Interventions: Call light in reach, Bed/chair exit alarm              Problem: Patient Education: Go to Patient Education Activity  Goal: Patient/Family Education  Outcome: Progressing Towards Goal

## 2022-11-06 NOTE — PROGRESS NOTES
Bedside and Verbal shift change report given to Candance Deer, RN (oncoming nurse) by Nida Riley RN (offgoing nurse). Report included the following information SBAR, Kardex, and MAR.

## 2022-11-06 NOTE — PROGRESS NOTES
Hospitalist Progress Note    Daily Progress Note: 11/6/2022 2:10 PM    Assessment/Plan     78 AA Female with Esophageal Cancer and has completed XRT + Chemo with:     # Critical Life-threatening Hypokalemia  # SEBAS with Metabolic Acidosis; resolved  # Hx HTN  # N/V  # Pancytopenia     => S/p IVF with NaHCO3  => Repeat labs; cont replacing K+  => Sx control  => C/s Renal/Damodar following  => C/s GI/[place] --> for endoscopy Monday 11/7/22; however, patient now declining EGD  => Hold ACE-I  => Reviewed home meds in [facility]  => Free water 100cc 3x daily via PEG     Disposition Status: Continue Inpatient Mgmt; SNF   DVT Prophylaxis: Lovenox  GI Prophylaxis: Protonix  Code Status: FULL  POA: Self/Daughter  Advanced Directive Discussion: N/A  -----------------------------------------------------    Admission Hx/HPI:  Marc Quinn is a 78 y.o. female who follows with Dr. Murvin Bosworth for Esophageal CA and is s/p XRT and Chemo over past 2 months with increasing fatigue, n/v. Outpatient w/up showed low potassium for which she was prescribed replacement and she took. However, d/t persistent weakness, she came to ER. She is accompanied by her daughter at bedside. Patient lives by herself. + Sob, cough. No definite fever. In ER, K+ was 2.0 with CO2 of 12. Replacement as started. ----------------------------------------    Subjective:  Continues to feel well. No active nausea. No recurrent vomiting. Does not want to have EGD as she had one in September at [facility].    Per Nutrition:    Advance diet when medically appropriate to Puree   Consult SLP for diet texture/consistency   When able Restart Feeding via Peg (per home regimen) of Two Alok 237 ml   Bolus 4 x day   4.   Flush tube with 180 ml water q 4 hours  This will provide 1896 kcal (100%), 79 g pro (85%), 1744 ml (97%)  Monitor diet advancement, initiation of enteral feeding, I/O's, weight     Per Physical Therapist: SNF      Review of Systems    Constitutional: No fevers, No chills, No night sweats, + fatigue/weakness, No significant weight change  Eyes: No visual disturbance, No loss of vision  HENT: No nasal congestion, No sore throat, No head lesion, No hearing deficit  Respiratory: No cough, No sputum, No wheezing, No SOB, No hemoptysis, No pleuritic CP  Cardiovascular: No chest pain, No lower extremity edema, No palpitations, No PND, No orthopnea, No JIANG  Gastrointestinal: + nausea/vomiting, No diarrhea, No constipation, No abdominal pain, No Melena, No hematemesis, No BRBPR.  PEG  Genitourinary: No frequency, No dysuria, No hematuria, No discharge  Integument: No rash, No new skin lesion(s), No dryness, No new palpable nodule  Musculoskeletal: No arthralgias, No neck pain, No back pain, No joint pain, No fall or injury  Neurological: No headaches, No dizziness, No confusion,  No seizures, No focal weakness, No LOC, No paresthesia  Heme/Onc: No overt bleeding noted, No obvious swollen glands  Behavioral/Psychiatric: No change in mood; no SI; no hallucination      Current Facility-Administered Medications   Medication Dose Route Frequency    pantoprazole (PROTONIX) granules for oral suspension 40 mg  40 mg Per NG tube BID    potassium phosphate, monobasic (K-PHOS) tablet 500 mg  1 Tablet Oral TID    potassium chloride (KLOR-CON) packet for solution 40 mEq  40 mEq Per G Tube NOW    magnesium gluconate tablet 27 mg [elemental]  1 Tablet Oral TID    potassium phosphate, monobasic (K-PHOS) tablet 500 mg  1 Tablet Oral BID    famotidine (PF) (PEPCID) 20 mg in 0.9% sodium chloride 10 mL injection  20 mg IntraVENous Q12H PRN    sodium bicarbonate tablet 650 mg  650 mg Per G Tube TID    prochlorperazine (COMPAZINE) injection 10 mg  10 mg IntraVENous Q6H PRN    sodium chloride (NS) flush 5-40 mL  5-40 mL IntraVENous Q8H    sodium chloride (NS) flush 5-40 mL  5-40 mL IntraVENous PRN    acetaminophen (TYLENOL) tablet 650 mg  650 mg Oral Q6H PRN    Or    acetaminophen (TYLENOL) suppository 650 mg  650 mg Rectal Q6H PRN    polyethylene glycol (MIRALAX) packet 17 g  17 g Oral DAILY PRN    ondansetron (ZOFRAN ODT) tablet 4 mg  4 mg Oral Q8H PRN    Or    ondansetron (ZOFRAN) injection 4 mg  4 mg IntraVENous Q6H PRN    enoxaparin (LOVENOX) injection 40 mg  40 mg SubCUTAneous DAILY    amLODIPine (NORVASC) tablet 5 mg  5 mg Oral DAILY    atorvastatin (LIPITOR) tablet 20 mg  20 mg Oral DAILY    [Held by provider] lisinopriL (PRINIVIL, ZESTRIL) tablet 10 mg  10 mg Oral DAILY    dextrose 5% - 0.45% NaCl with KCl 20 mEq/L infusion  50 mL/hr IntraVENous CONTINUOUS       Objective     Visit Vitals  /71 (BP 1 Location: Left upper arm, BP Patient Position: At rest)   Pulse 68   Temp 98.2 °F (36.8 °C)   Resp 18   Ht 5' 6\" (1.676 m)   Wt 71.8 kg (158 lb 4.8 oz)   SpO2 100%   Breastfeeding No   BMI 25.55 kg/m²      O2 Device: None (Room air)    Temp (24hrs), Av.5 °F (36.9 °C), Min:98.2 °F (36.8 °C), Max:99 °F (37.2 °C)      No intake/output data recorded.  1901 -  0700  In: -   Out: 900 [Urine:900]      PHYSICAL EXAM    General: Awake and responsive, NAD; sitting in chair  Neck: Supple, radiation scarring and inflammatory changes  Vascular: No carotid bruit, palpable pulses bilat  Lung: CTA bilat, vesicular breath sounds  Heart: S1S2, No significant murmur appreciated; Sinus on Tele  Abdomen: Soft, NT, No rigidity, No rebound, Bowel sounds +; PEG in place  Extremities: No edema  M/S: No traumatic change, active mobility noted  Skin: Keloid scar upper chest  Neurologic: No overt focal motor deficit. AxOx3.    Psychiatric: Coherent and age appropriate affect      Data Review    Recent Results (from the past 24 hour(s))   CBC WITH AUTOMATED DIFF    Collection Time: 22 12:16 PM   Result Value Ref Range    WBC 3.2 (L) 3.6 - 11.0 K/uL    RBC 2.83 (L) 3.80 - 5.20 M/uL    HGB 9.3 (L) 11.5 - 16.0 g/dL    HCT 27.3 (L) 35.0 - 47.0 %    MCV 96.5 80.0 - 99.0 FL    MCH 32.9 26.0 - 34.0 PG    MCHC 34.1 30.0 - 36.5 g/dL    RDW 20.9 (H) 11.5 - 14.5 %    PLATELET 85 (L) 056 - 400 K/uL    MPV 10.7 8.9 - 12.9 FL    NRBC 0.0 0.0  WBC    ABSOLUTE NRBC 0.00 0.00 - 0.01 K/uL    NEUTROPHILS 69 32 - 75 %    LYMPHOCYTES 19 12 - 49 %    MONOCYTES 11 5 - 13 %    EOSINOPHILS 0 0 - 7 %    BASOPHILS 0 0 - 1 %    IMMATURE GRANULOCYTES 1 (H) 0 - 0.5 %    ABS. NEUTROPHILS 2.1 1.8 - 8.0 K/UL    ABS. LYMPHOCYTES 0.6 (L) 0.8 - 3.5 K/UL    ABS. MONOCYTES 0.4 0.0 - 1.0 K/UL    ABS. EOSINOPHILS 0.0 0.0 - 0.4 K/UL    ABS. BASOPHILS 0.0 0.0 - 0.1 K/UL    ABS. IMM.  GRANS. 0.0 0.00 - 0.04 K/UL    DF AUTOMATED     MAGNESIUM    Collection Time: 11/06/22 12:16 PM   Result Value Ref Range    Magnesium 1.7 1.6 - 2.4 mg/dL   METABOLIC PANEL, BASIC    Collection Time: 11/06/22 12:16 PM   Result Value Ref Range    Sodium 147 (H) 136 - 145 mmol/L    Potassium 2.9 (L) 3.5 - 5.1 mmol/L    Chloride 115 (H) 97 - 108 mmol/L    CO2 27 21 - 32 mmol/L    Anion gap 5 5 - 15 mmol/L    Glucose 102 (H) 65 - 100 mg/dL    BUN 7 6 - 20 mg/dL    Creatinine 0.46 (L) 0.55 - 1.02 mg/dL    BUN/Creatinine ratio 15 12 - 20      eGFR >60 >60 ml/min/1.73m2    Calcium 8.1 (L) 8.5 - 10.1 mg/dL   PHOSPHORUS    Collection Time: 11/06/22 12:16 PM   Result Value Ref Range    Phosphorus 1.6 (L) 2.6 - 4.7 mg/dL       XR CHEST PORT   Final Result   No Acute Disease.              ________________________________________  Giuseppe Loza MD

## 2022-11-06 NOTE — PROGRESS NOTES
Pt has been compliant and stabled throughout the day. Meds given per MAR. POC was gone over with pt. Pt stated to MD that she is not certain if she wants to do the EGD on 11/7/2022. Writer spoke with Pt and she stated she feels like she is on the fence about it, and will make a decision  tomorrow in the morning. Pt shows no signs of distress. Pt is tolerating meds through PEG with no issues. Call bel within reach. Problem: Falls - Risk of  Goal: *Absence of Falls  Description: Document Vernia Colder Fall Risk and appropriate interventions in the flowsheet. Outcome: Progressing Towards Goal  Note: Fall Risk Interventions:  Mobility Interventions: Patient to call before getting OOB         Medication Interventions: Patient to call before getting OOB    Elimination Interventions: Call light in reach              Problem: Patient Education: Go to Patient Education Activity  Goal: Patient/Family Education  Outcome: Progressing Towards Goal     Problem: Pressure Injury - Risk of  Goal: *Prevention of pressure injury  Description: Document Wesly Scale and appropriate interventions in the flowsheet.   Outcome: Progressing Towards Goal  Note: Pressure Injury Interventions:  Sensory Interventions: Assess changes in LOC    Moisture Interventions: Absorbent underpads    Activity Interventions: Increase time out of bed    Mobility Interventions: PT/OT evaluation    Nutrition Interventions: Document food/fluid/supplement intake                     Problem: Patient Education: Go to Patient Education Activity  Goal: Patient/Family Education  Outcome: Progressing Towards Goal     Problem: Aspiration - Risk of  Goal: *Absence of aspiration  Outcome: Progressing Towards Goal     Problem: Patient Education: Go to Patient Education Activity  Goal: Patient/Family Education  Outcome: Progressing Towards Goal     Problem: Nausea/Vomiting (Adult)  Goal: *Absence of nausea/vomiting  Outcome: Progressing Towards Goal  Goal: *Palliation of nausea/vomiting (Palliative Care)  Outcome: Progressing Towards Goal     Problem: Patient Education: Go to Patient Education Activity  Goal: Patient/Family Education  Outcome: Progressing Towards Goal     Problem: Patient Education: Go to Patient Education Activity  Goal: Patient/Family Education  Outcome: Progressing Towards Goal     Problem: Patient Education: Go to Patient Education Activity  Goal: Patient/Family Education  Outcome: Progressing Towards Goal

## 2022-11-07 ENCOUNTER — APPOINTMENT (OUTPATIENT)
Dept: ENDOSCOPY | Age: 67
DRG: 375 | End: 2022-11-07
Attending: INTERNAL MEDICINE
Payer: MEDICARE

## 2022-11-07 LAB
ANION GAP SERPL CALC-SCNC: 8 MMOL/L (ref 5–15)
BASOPHILS # BLD: 0 K/UL (ref 0–0.1)
BASOPHILS NFR BLD: 0 % (ref 0–1)
BUN SERPL-MCNC: 4 MG/DL (ref 6–20)
BUN/CREAT SERPL: 9 (ref 12–20)
CA-I BLD-MCNC: 7.9 MG/DL (ref 8.5–10.1)
CHLORIDE SERPL-SCNC: 113 MMOL/L (ref 97–108)
CO2 SERPL-SCNC: 27 MMOL/L (ref 21–32)
CREAT SERPL-MCNC: 0.47 MG/DL (ref 0.55–1.02)
DIFFERENTIAL METHOD BLD: ABNORMAL
EOSINOPHIL # BLD: 0 K/UL (ref 0–0.4)
EOSINOPHIL NFR BLD: 0 % (ref 0–7)
ERYTHROCYTE [DISTWIDTH] IN BLOOD BY AUTOMATED COUNT: 21.2 % (ref 11.5–14.5)
GLUCOSE BLD STRIP.AUTO-MCNC: 81 MG/DL (ref 65–100)
GLUCOSE BLD STRIP.AUTO-MCNC: 90 MG/DL (ref 65–100)
GLUCOSE BLD STRIP.AUTO-MCNC: 95 MG/DL (ref 65–100)
GLUCOSE SERPL-MCNC: 96 MG/DL (ref 65–100)
HCT VFR BLD AUTO: 26.7 % (ref 35–47)
HGB BLD-MCNC: 9.1 G/DL (ref 11.5–16)
IMM GRANULOCYTES # BLD AUTO: 0 K/UL (ref 0–0.04)
IMM GRANULOCYTES NFR BLD AUTO: 1 % (ref 0–0.5)
LYMPHOCYTES # BLD: 0.8 K/UL (ref 0.8–3.5)
LYMPHOCYTES NFR BLD: 23 % (ref 12–49)
MAGNESIUM SERPL-MCNC: 1.6 MG/DL (ref 1.6–2.4)
MCH RBC QN AUTO: 33.2 PG (ref 26–34)
MCHC RBC AUTO-ENTMCNC: 34.1 G/DL (ref 30–36.5)
MCV RBC AUTO: 97.4 FL (ref 80–99)
MONOCYTES # BLD: 0.5 K/UL (ref 0–1)
MONOCYTES NFR BLD: 13 % (ref 5–13)
NEUTS SEG # BLD: 2.3 K/UL (ref 1.8–8)
NEUTS SEG NFR BLD: 63 % (ref 32–75)
NRBC # BLD: 0 K/UL (ref 0–0.01)
NRBC BLD-RTO: 0 PER 100 WBC
PERFORMED BY, TECHID: NORMAL
PHOSPHATE SERPL-MCNC: 2.3 MG/DL (ref 2.6–4.7)
PLATELET # BLD AUTO: 78 K/UL (ref 150–400)
PMV BLD AUTO: 11.4 FL (ref 8.9–12.9)
POTASSIUM SERPL-SCNC: 3 MMOL/L (ref 3.5–5.1)
RBC # BLD AUTO: 2.74 M/UL (ref 3.8–5.2)
SODIUM SERPL-SCNC: 148 MMOL/L (ref 136–145)
WBC # BLD AUTO: 3.7 K/UL (ref 3.6–11)

## 2022-11-07 PROCEDURE — 74011250637 HC RX REV CODE- 250/637: Performed by: INTERNAL MEDICINE

## 2022-11-07 PROCEDURE — 80048 BASIC METABOLIC PNL TOTAL CA: CPT

## 2022-11-07 PROCEDURE — 83735 ASSAY OF MAGNESIUM: CPT

## 2022-11-07 PROCEDURE — 85025 COMPLETE CBC W/AUTO DIFF WBC: CPT

## 2022-11-07 PROCEDURE — 65270000029 HC RM PRIVATE

## 2022-11-07 PROCEDURE — C1726 CATH, BAL DIL, NON-VASCULAR: HCPCS | Performed by: INTERNAL MEDICINE

## 2022-11-07 PROCEDURE — 74011000250 HC RX REV CODE- 250: Performed by: INTERNAL MEDICINE

## 2022-11-07 PROCEDURE — 0D758DZ DILATION OF ESOPHAGUS WITH INTRALUMINAL DEVICE, VIA NATURAL OR ARTIFICIAL OPENING ENDOSCOPIC: ICD-10-PCS | Performed by: INTERNAL MEDICINE

## 2022-11-07 PROCEDURE — 74011250636 HC RX REV CODE- 250/636: Performed by: INTERNAL MEDICINE

## 2022-11-07 PROCEDURE — 76040000007: Performed by: INTERNAL MEDICINE

## 2022-11-07 PROCEDURE — 2709999900 HC NON-CHARGEABLE SUPPLY: Performed by: INTERNAL MEDICINE

## 2022-11-07 PROCEDURE — 36415 COLL VENOUS BLD VENIPUNCTURE: CPT

## 2022-11-07 PROCEDURE — 84100 ASSAY OF PHOSPHORUS: CPT

## 2022-11-07 PROCEDURE — 82962 GLUCOSE BLOOD TEST: CPT

## 2022-11-07 PROCEDURE — 77030031670 HC DEV INFL ENDOTEK BIG60 MRTM -B: Performed by: INTERNAL MEDICINE

## 2022-11-07 RX ORDER — POTASSIUM CHLORIDE 1.5 G/1.77G
40 POWDER, FOR SOLUTION ORAL
Status: COMPLETED | OUTPATIENT
Start: 2022-11-07 | End: 2022-11-07

## 2022-11-07 RX ORDER — MIDAZOLAM HYDROCHLORIDE 1 MG/ML
INJECTION INTRAMUSCULAR; INTRAVENOUS AS NEEDED
Status: DISCONTINUED | OUTPATIENT
Start: 2022-11-07 | End: 2022-11-11 | Stop reason: HOSPADM

## 2022-11-07 RX ORDER — FENTANYL CITRATE 50 UG/ML
INJECTION, SOLUTION INTRAMUSCULAR; INTRAVENOUS AS NEEDED
Status: DISCONTINUED | OUTPATIENT
Start: 2022-11-07 | End: 2022-11-11 | Stop reason: HOSPADM

## 2022-11-07 RX ADMIN — SODIUM BICARBONATE 650 MG: 650 TABLET ORAL at 20:51

## 2022-11-07 RX ADMIN — SODIUM BICARBONATE 650 MG: 650 TABLET ORAL at 16:31

## 2022-11-07 RX ADMIN — Medication 27 MG: at 16:32

## 2022-11-07 RX ADMIN — SODIUM CHLORIDE, PRESERVATIVE FREE 10 ML: 5 INJECTION INTRAVENOUS at 20:51

## 2022-11-07 RX ADMIN — POTASSIUM PHOSPHATE, MONOBASIC 500 MG: 500 TABLET, SOLUBLE ORAL at 20:50

## 2022-11-07 RX ADMIN — DEXTROSE MONOHYDRATE, SODIUM CHLORIDE, AND POTASSIUM CHLORIDE 50 ML/HR: 50; 4.5; 1.49 INJECTION, SOLUTION INTRAVENOUS at 10:01

## 2022-11-07 RX ADMIN — Medication 27 MG: at 20:51

## 2022-11-07 RX ADMIN — POTASSIUM PHOSPHATE, MONOBASIC 500 MG: 500 TABLET, SOLUBLE ORAL at 16:00

## 2022-11-07 RX ADMIN — PANTOPRAZOLE SODIUM 40 MG: 40 GRANULE, DELAYED RELEASE ORAL at 20:51

## 2022-11-07 RX ADMIN — SODIUM CHLORIDE, PRESERVATIVE FREE 10 ML: 5 INJECTION INTRAVENOUS at 05:46

## 2022-11-07 RX ADMIN — POTASSIUM CHLORIDE 40 MEQ: 1.5 FOR SOLUTION ORAL at 16:31

## 2022-11-07 RX ADMIN — SODIUM CHLORIDE, PRESERVATIVE FREE 10 ML: 5 INJECTION INTRAVENOUS at 13:10

## 2022-11-07 RX ADMIN — SODIUM BICARBONATE 650 MG: 650 TABLET ORAL at 10:03

## 2022-11-07 NOTE — PROGRESS NOTES
Bedside shift change report given to Shelly DO (oncoming nurse) by Luz Askew (offgoing nurse). Report included the following information SBAR, Kardex, MAR, and Recent Results.

## 2022-11-07 NOTE — PROGRESS NOTES
OT attempted to see pt at 11:03 am, however pt off floor for endo. Will try again at a later time. Thanks!

## 2022-11-07 NOTE — PROGRESS NOTES
CM in to speak with patient and her daughter. Daughter and patient agreed patient will discharge home with her daughter and they have declined [facility] at this time. CM also asked about any equipment patient may need. Patient states she does not need a cane or a walker at this time.

## 2022-11-07 NOTE — PROGRESS NOTES
Hospitalist Progress Note    Daily Progress Note: 11/7/2022 2:10 PM    Assessment/Plan     78 AA Female with Esophageal Cancer and has completed XRT + Chemo with:     # Critical Life-threatening Hypokalemia  # SEBAS with Metabolic Acidosis; resolved  # Hx HTN  # N/V --> S/p EGD 11/7/22: Esophageal Stricture/Dilated; Gastritis  # Pancytopenia     => S/p IVF with NaHCO3  => Repeat labs; cont replacing K+  => PPI  => C/s Renal/Damodar following  => Hold ACE-I  => Reviewed home meds in Epic  => Free water 100cc 3x daily via PEG     Disposition Status: Continue Inpatient Mgmt; SNF   DVT Prophylaxis: Lovenox  GI Prophylaxis: Protonix  Code Status: FULL  POA: Self/Daughter  Advanced Directive Discussion: N/A  -----------------------------------------------------    Admission Hx/HPI:  Krish Sinha is a 78 y.o. female who follows with Dr. Keturah Gillespie for Esophageal CA and is s/p XRT and Chemo over past 2 months with increasing fatigue, n/v. Outpatient w/up showed low potassium for which she was prescribed replacement and she took. However, d/t persistent weakness, she came to ER. She is accompanied by her daughter at bedside. Patient lives by herself. + Sob, cough. No definite fever. In ER, K+ was 2.0 with CO2 of 12. Replacement as started. ----------------------------------------    Subjective:  No new issue; EGD today. Denies recurrent n/v, sob, CP. Per Nutrition:    Advance diet when medically appropriate to Puree   Consult SLP for diet texture/consistency   When able Restart Feeding via Peg (per home regimen) of Two Alok 237 ml   Bolus 4 x day   4.   Flush tube with 180 ml water q 4 hours  This will provide 1896 kcal (100%), 79 g pro (85%), 1744 ml (97%)  Monitor diet advancement, initiation of enteral feeding, I/O's, weight     Per Physical Therapist: SNF      Review of Systems    Constitutional: No fevers, No chills, No night sweats, + fatigue/weakness, No significant weight change  Eyes: No visual disturbance, No loss of vision  HENT: No nasal congestion, No sore throat, No head lesion, No hearing deficit  Respiratory: No cough, No sputum, No wheezing, No SOB, No hemoptysis, No pleuritic CP  Cardiovascular: No chest pain, No lower extremity edema, No palpitations, No PND, No orthopnea, No JIANG  Gastrointestinal: + nausea/vomiting, No diarrhea, No constipation, No abdominal pain, No Melena, No hematemesis, No BRBPR.  PEG  Genitourinary: No frequency, No dysuria, No hematuria, No discharge  Integument: No rash, No new skin lesion(s), No dryness, No new palpable nodule  Musculoskeletal: No arthralgias, No neck pain, No back pain, No joint pain, No fall or injury  Neurological: No headaches, No dizziness, No confusion,  No seizures, No focal weakness, No LOC, No paresthesia  Heme/Onc: No overt bleeding noted, No obvious swollen glands  Behavioral/Psychiatric: No change in mood; no SI; no hallucination      Current Facility-Administered Medications   Medication Dose Route Frequency    fentaNYL citrate (PF) injection    PRN    midazolam (PF) (VERSED) 1 mg/mL injection    PRN    pantoprazole (PROTONIX) granules for oral suspension 40 mg  40 mg Per NG tube BID    potassium phosphate, monobasic (K-PHOS) tablet 500 mg  1 Tablet Oral TID    magnesium gluconate tablet 27 mg [elemental]  1 Tablet Oral TID    famotidine (PF) (PEPCID) 20 mg in 0.9% sodium chloride 10 mL injection  20 mg IntraVENous Q12H PRN    sodium bicarbonate tablet 650 mg  650 mg Per G Tube TID    prochlorperazine (COMPAZINE) injection 10 mg  10 mg IntraVENous Q6H PRN    sodium chloride (NS) flush 5-40 mL  5-40 mL IntraVENous Q8H    sodium chloride (NS) flush 5-40 mL  5-40 mL IntraVENous PRN    acetaminophen (TYLENOL) tablet 650 mg  650 mg Oral Q6H PRN    Or    acetaminophen (TYLENOL) suppository 650 mg  650 mg Rectal Q6H PRN    polyethylene glycol (MIRALAX) packet 17 g  17 g Oral DAILY PRN    ondansetron (ZOFRAN ODT) tablet 4 mg  4 mg Oral Q8H PRN    Or    ondansetron (ZOFRAN) injection 4 mg  4 mg IntraVENous Q6H PRN    amLODIPine (NORVASC) tablet 5 mg  5 mg Oral DAILY    atorvastatin (LIPITOR) tablet 20 mg  20 mg Oral DAILY    [Held by provider] lisinopriL (PRINIVIL, ZESTRIL) tablet 10 mg  10 mg Oral DAILY    dextrose 5% - 0.45% NaCl with KCl 20 mEq/L infusion  50 mL/hr IntraVENous CONTINUOUS       Objective     Visit Vitals  /74 (BP 1 Location: Left upper arm, BP Patient Position: At rest)   Pulse 72   Temp 97.2 °F (36.2 °C)   Resp 16   Ht 5' 6\" (1.676 m)   Wt 71.8 kg (158 lb 4.8 oz)   SpO2 100%   Breastfeeding No   BMI 25.55 kg/m²      O2 Device: Nasal cannula    Temp (24hrs), Av °F (36.7 °C), Min:97.2 °F (36.2 °C), Max:98.5 °F (36.9 °C)      701 - 1900  In: -   Out: 500 [Urine:500]  1901 - 700  In: 0   Out: 750 [Urine:750]      PHYSICAL EXAM    General: Awake and responsive, NAD; sitting in chair  Neck: Supple, radiation scarring and inflammatory changes  Vascular: No carotid bruit, palpable pulses bilat  Lung: CTA bilat, vesicular breath sounds  Heart: S1S2, No significant murmur appreciated; Sinus on Tele  Abdomen: Soft, NT, No rigidity, No rebound, Bowel sounds +; PEG in place  Extremities: No edema  M/S: No traumatic change, active mobility noted  Skin: Keloid scar upper chest  Neurologic: No overt focal motor deficit. AxOx3.    Psychiatric: Coherent and age appropriate affect      Data Review    Recent Results (from the past 24 hour(s))   GLUCOSE, POC    Collection Time: 22  8:20 AM   Result Value Ref Range    Glucose (POC) 90 65 - 100 mg/dL    Performed by Abbe Ponce    CBC WITH AUTOMATED DIFF    Collection Time: 22  8:28 AM   Result Value Ref Range    WBC 3.7 3.6 - 11.0 K/uL    RBC 2.74 (L) 3.80 - 5.20 M/uL    HGB 9.1 (L) 11.5 - 16.0 g/dL    HCT 26.7 (L) 35.0 - 47.0 %    MCV 97.4 80.0 - 99.0 FL    MCH 33.2 26.0 - 34.0 PG    MCHC 34.1 30.0 - 36.5 g/dL    RDW 21.2 (H) 11.5 - 14.5 %    PLATELET 78 (L) 057 - 400 K/uL    MPV 11.4 8.9 - 12.9 FL    NRBC 0.0 0.0  WBC    ABSOLUTE NRBC 0.00 0.00 - 0.01 K/uL    NEUTROPHILS 63 32 - 75 %    LYMPHOCYTES 23 12 - 49 %    MONOCYTES 13 5 - 13 %    EOSINOPHILS 0 0 - 7 %    BASOPHILS 0 0 - 1 %    IMMATURE GRANULOCYTES 1 (H) 0 - 0.5 %    ABS. NEUTROPHILS 2.3 1.8 - 8.0 K/UL    ABS. LYMPHOCYTES 0.8 0.8 - 3.5 K/UL    ABS. MONOCYTES 0.5 0.0 - 1.0 K/UL    ABS. EOSINOPHILS 0.0 0.0 - 0.4 K/UL    ABS. BASOPHILS 0.0 0.0 - 0.1 K/UL    ABS. IMM.  GRANS. 0.0 0.00 - 0.04 K/UL    DF AUTOMATED     MAGNESIUM    Collection Time: 11/07/22  8:28 AM   Result Value Ref Range    Magnesium 1.6 1.6 - 2.4 mg/dL   METABOLIC PANEL, BASIC    Collection Time: 11/07/22  8:28 AM   Result Value Ref Range    Sodium 148 (H) 136 - 145 mmol/L    Potassium 3.0 (L) 3.5 - 5.1 mmol/L    Chloride 113 (H) 97 - 108 mmol/L    CO2 27 21 - 32 mmol/L    Anion gap 8 5 - 15 mmol/L    Glucose 96 65 - 100 mg/dL    BUN 4 (L) 6 - 20 mg/dL    Creatinine 0.47 (L) 0.55 - 1.02 mg/dL    BUN/Creatinine ratio 9 (L) 12 - 20      eGFR >60 >60 ml/min/1.73m2    Calcium 7.9 (L) 8.5 - 10.1 mg/dL   PHOSPHORUS    Collection Time: 11/07/22  8:28 AM   Result Value Ref Range    Phosphorus 2.3 (L) 2.6 - 4.7 mg/dL       XR CHEST PORT   Final Result   No Acute Disease.              ________________________________________  Roxanne Kaba MD

## 2022-11-07 NOTE — PROGRESS NOTES
Problem: Falls - Risk of  Goal: *Absence of Falls  Description: Document Susi Arzola Fall Risk and appropriate interventions in the flowsheet. Outcome: Progressing Towards Goal  Note: Fall Risk Interventions:  Mobility Interventions: Bed/chair exit alarm, Utilize walker, cane, or other assistive device, Patient to call before getting OOB         Medication Interventions: Bed/chair exit alarm, Patient to call before getting OOB, Teach patient to arise slowly    Elimination Interventions: Bed/chair exit alarm, Call light in reach, Patient to call for help with toileting needs              Problem: Patient Education: Go to Patient Education Activity  Goal: Patient/Family Education  Outcome: Progressing Towards Goal     Problem: Pressure Injury - Risk of  Goal: *Prevention of pressure injury  Description: Document Wesly Scale and appropriate interventions in the flowsheet.   Outcome: Progressing Towards Goal  Note: Pressure Injury Interventions:  Sensory Interventions: Keep linens dry and wrinkle-free, Maintain/enhance activity level, Minimize linen layers    Moisture Interventions: Absorbent underpads, Internal/External urinary devices, Minimize layers    Activity Interventions: Assess need for specialty bed, Increase time out of bed    Mobility Interventions: Assess need for specialty bed, HOB 30 degrees or less    Nutrition Interventions: Document food/fluid/supplement intake                     Problem: Patient Education: Go to Patient Education Activity  Goal: Patient/Family Education  Outcome: Progressing Towards Goal     Problem: Aspiration - Risk of  Goal: *Absence of aspiration  Outcome: Progressing Towards Goal     Problem: Patient Education: Go to Patient Education Activity  Goal: Patient/Family Education  Outcome: Progressing Towards Goal     Problem: Nausea/Vomiting (Adult)  Goal: *Absence of nausea/vomiting  Outcome: Progressing Towards Goal  Goal: *Palliation of nausea/vomiting (Palliative Care)  Outcome: Progressing Towards Goal     Problem: Patient Education: Go to Patient Education Activity  Goal: Patient/Family Education  Outcome: Progressing Towards Goal     Problem: Patient Education: Go to Patient Education Activity  Goal: Patient/Family Education  Outcome: Progressing Towards Goal     Problem: Patient Education: Go to Patient Education Activity  Goal: Patient/Family Education  Outcome: Progressing Towards Goal

## 2022-11-07 NOTE — PROGRESS NOTES
*ATTENTION:  This note has been created by a medical student for educational purposes only. Please do not refer to the content of this note for clinical decision-making, billing, or other purposes. Please see attending physicians note to obtain clinical information on this patient. *              Progress Note    Patient: Jessica Aparicio MRN: [id number]  SSN: [id number]    YOB: 1955  Age: 78 y.o. Sex: female      Admit Date: 11/3/2022    LOS: 4 days     Subjective:     Saw the patient at bedside. She states she is doing well overall, no complaints at this time. She had EGD done this morning in which dilatation of the esophagus to 11 mm was performed. The patient has not had any food since that time so cannot assess for improvement of dysphagia. The patient's potassium is 3.0 today, improved from prior. Objective:     Vitals:    11/07/22 1159 11/07/22 1201 11/07/22 1211 11/07/22 1607   BP: 128/74 129/88 124/74 139/81   Pulse: 83 82 72 76   Resp: 16 18 16 23   Temp:    98.3 °F (36.8 °C)   SpO2:    100%   Weight:       Height:            Intake and Output:  Current Shift: 11/07 0701 - 11/07 1900  In: 450 [P.O.:350]  Out: 500 [Urine:500]  Last three shifts: 11/05 1901 - 11/07 0700  In: 0   Out: 750 [Urine:750]    Physical Exam:   Skin:  Extremities and face reveal no rashes. No beavers erythema. HEENT: Sclerae anicteric. Extra-occular muscles are intact. No abnormal pigmentation of the lips. The neck is supple. Cardiovascular: Regular rate and rhythm. Respiratory:  Comfortable breathing with no accessory muscle use. GI:  Abdomen nondistended, soft, and nontender. No enlargement of the liver or spleen. No masses palpable. Rectal:  Deferred  Musculoskeletal: Extremities have good range of motion. Neurological:  Gross memory appears intact. Patient is alert and oriented. Psychiatric:  Mood appears appropriate with judgement intact.   Lymphatic:  No visible adenopathy      Lab/Data Review:  Recent Results (from the past 24 hour(s))   GLUCOSE, POC    Collection Time: 11/07/22  8:20 AM   Result Value Ref Range    Glucose (POC) 90 65 - 100 mg/dL    Performed by Selene Veloz    CBC WITH AUTOMATED DIFF    Collection Time: 11/07/22  8:28 AM   Result Value Ref Range    WBC 3.7 3.6 - 11.0 K/uL    RBC 2.74 (L) 3.80 - 5.20 M/uL    HGB 9.1 (L) 11.5 - 16.0 g/dL    HCT 26.7 (L) 35.0 - 47.0 %    MCV 97.4 80.0 - 99.0 FL    MCH 33.2 26.0 - 34.0 PG    MCHC 34.1 30.0 - 36.5 g/dL    RDW 21.2 (H) 11.5 - 14.5 %    PLATELET 78 (L) 855 - 400 K/uL    MPV 11.4 8.9 - 12.9 FL    NRBC 0.0 0.0  WBC    ABSOLUTE NRBC 0.00 0.00 - 0.01 K/uL    NEUTROPHILS 63 32 - 75 %    LYMPHOCYTES 23 12 - 49 %    MONOCYTES 13 5 - 13 %    EOSINOPHILS 0 0 - 7 %    BASOPHILS 0 0 - 1 %    IMMATURE GRANULOCYTES 1 (H) 0 - 0.5 %    ABS. NEUTROPHILS 2.3 1.8 - 8.0 K/UL    ABS. LYMPHOCYTES 0.8 0.8 - 3.5 K/UL    ABS. MONOCYTES 0.5 0.0 - 1.0 K/UL    ABS. EOSINOPHILS 0.0 0.0 - 0.4 K/UL    ABS. BASOPHILS 0.0 0.0 - 0.1 K/UL    ABS. IMM. GRANS. 0.0 0.00 - 0.04 K/UL    DF AUTOMATED     MAGNESIUM    Collection Time: 11/07/22  8:28 AM   Result Value Ref Range    Magnesium 1.6 1.6 - 2.4 mg/dL   METABOLIC PANEL, BASIC    Collection Time: 11/07/22  8:28 AM   Result Value Ref Range    Sodium 148 (H) 136 - 145 mmol/L    Potassium 3.0 (L) 3.5 - 5.1 mmol/L    Chloride 113 (H) 97 - 108 mmol/L    CO2 27 21 - 32 mmol/L    Anion gap 8 5 - 15 mmol/L    Glucose 96 65 - 100 mg/dL    BUN 4 (L) 6 - 20 mg/dL    Creatinine 0.47 (L) 0.55 - 1.02 mg/dL    BUN/Creatinine ratio 9 (L) 12 - 20      eGFR >60 >60 ml/min/1.73m2    Calcium 7.9 (L) 8.5 - 10.1 mg/dL   PHOSPHORUS    Collection Time: 11/07/22  8:28 AM   Result Value Ref Range    Phosphorus 2.3 (L) 2.6 - 4.7 mg/dL   GLUCOSE, POC    Collection Time: 11/07/22  4:11 PM   Result Value Ref Range    Glucose (POC) 81 65 - 100 mg/dL    Performed by Selene Veloz               XR CHEST PORT   Final Result   No Acute Disease. Assessment:     Active Problems:    Hypokalemia (65/7/8035)      Metabolic acidosis (94/1/7737)        Plan:     EGD done today with dilatation of the esophagus. Will trial PO with soft diet to assess for improvement in dysphagia symptoms. Plan to have the patient follow-up in clinic in 4 weeks. Continue Protonix  Consult Oncology to determine need for steroids  Will continue monitoring labs including potassium level. Replete potassium as needed. Thank you for allowing me to participate in this patients care    Signed By: Bob Rosas     November 7, 2022    Patient seen and examined agree with impression and plan.   Alesia Bass MD

## 2022-11-08 LAB
ANION GAP SERPL CALC-SCNC: 7 MMOL/L (ref 5–15)
BASOPHILS # BLD: 0 K/UL (ref 0–0.1)
BASOPHILS NFR BLD: 0 % (ref 0–1)
BUN SERPL-MCNC: 3 MG/DL (ref 6–20)
BUN/CREAT SERPL: 6 (ref 12–20)
CA-I BLD-MCNC: 8.1 MG/DL (ref 8.5–10.1)
CHLORIDE SERPL-SCNC: 111 MMOL/L (ref 97–108)
CO2 SERPL-SCNC: 27 MMOL/L (ref 21–32)
CREAT SERPL-MCNC: 0.48 MG/DL (ref 0.55–1.02)
DIFFERENTIAL METHOD BLD: ABNORMAL
EOSINOPHIL # BLD: 0 K/UL (ref 0–0.4)
EOSINOPHIL NFR BLD: 0 % (ref 0–7)
ERYTHROCYTE [DISTWIDTH] IN BLOOD BY AUTOMATED COUNT: 20.4 % (ref 11.5–14.5)
GLUCOSE SERPL-MCNC: 98 MG/DL (ref 65–100)
HCT VFR BLD AUTO: 28.2 % (ref 35–47)
HGB BLD-MCNC: 9.5 G/DL (ref 11.5–16)
IMM GRANULOCYTES # BLD AUTO: 0 K/UL (ref 0–0.04)
IMM GRANULOCYTES NFR BLD AUTO: 1 % (ref 0–0.5)
LYMPHOCYTES # BLD: 0.7 K/UL (ref 0.8–3.5)
LYMPHOCYTES NFR BLD: 22 % (ref 12–49)
MAGNESIUM SERPL-MCNC: 1.5 MG/DL (ref 1.6–2.4)
MCH RBC QN AUTO: 33 PG (ref 26–34)
MCHC RBC AUTO-ENTMCNC: 33.7 G/DL (ref 30–36.5)
MCV RBC AUTO: 97.9 FL (ref 80–99)
MONOCYTES # BLD: 0.4 K/UL (ref 0–1)
MONOCYTES NFR BLD: 11 % (ref 5–13)
NEUTS SEG # BLD: 2.2 K/UL (ref 1.8–8)
NEUTS SEG NFR BLD: 66 % (ref 32–75)
NRBC # BLD: 0 K/UL (ref 0–0.01)
NRBC BLD-RTO: 0 PER 100 WBC
PHOSPHATE SERPL-MCNC: 3 MG/DL (ref 2.6–4.7)
PLATELET # BLD AUTO: 71 K/UL (ref 150–400)
PMV BLD AUTO: 10.3 FL (ref 8.9–12.9)
POTASSIUM SERPL-SCNC: 2.9 MMOL/L (ref 3.5–5.1)
RBC # BLD AUTO: 2.88 M/UL (ref 3.8–5.2)
SODIUM SERPL-SCNC: 145 MMOL/L (ref 136–145)
WBC # BLD AUTO: 3.3 K/UL (ref 3.6–11)

## 2022-11-08 PROCEDURE — 84100 ASSAY OF PHOSPHORUS: CPT

## 2022-11-08 PROCEDURE — 74011250637 HC RX REV CODE- 250/637: Performed by: INTERNAL MEDICINE

## 2022-11-08 PROCEDURE — 97530 THERAPEUTIC ACTIVITIES: CPT

## 2022-11-08 PROCEDURE — 36415 COLL VENOUS BLD VENIPUNCTURE: CPT

## 2022-11-08 PROCEDURE — 85025 COMPLETE CBC W/AUTO DIFF WBC: CPT

## 2022-11-08 PROCEDURE — 83735 ASSAY OF MAGNESIUM: CPT

## 2022-11-08 PROCEDURE — 80048 BASIC METABOLIC PNL TOTAL CA: CPT

## 2022-11-08 PROCEDURE — 74011000250 HC RX REV CODE- 250: Performed by: INTERNAL MEDICINE

## 2022-11-08 PROCEDURE — 74011250636 HC RX REV CODE- 250/636: Performed by: INTERNAL MEDICINE

## 2022-11-08 PROCEDURE — 65270000029 HC RM PRIVATE

## 2022-11-08 RX ORDER — MAGNESIUM SULFATE HEPTAHYDRATE 40 MG/ML
2 INJECTION, SOLUTION INTRAVENOUS ONCE
Status: COMPLETED | OUTPATIENT
Start: 2022-11-08 | End: 2022-11-08

## 2022-11-08 RX ORDER — PANTOPRAZOLE SODIUM 40 MG/1
40 GRANULE, DELAYED RELEASE ORAL DAILY
Status: DISCONTINUED | OUTPATIENT
Start: 2022-11-09 | End: 2022-11-11 | Stop reason: HOSPADM

## 2022-11-08 RX ORDER — POTASSIUM CHLORIDE 7.45 MG/ML
10 INJECTION INTRAVENOUS
Status: COMPLETED | OUTPATIENT
Start: 2022-11-08 | End: 2022-11-08

## 2022-11-08 RX ADMIN — Medication 27 MG: at 21:24

## 2022-11-08 RX ADMIN — PANTOPRAZOLE SODIUM 40 MG: 40 GRANULE, DELAYED RELEASE ORAL at 08:09

## 2022-11-08 RX ADMIN — SODIUM BICARBONATE 650 MG: 650 TABLET ORAL at 16:14

## 2022-11-08 RX ADMIN — POTASSIUM PHOSPHATE, MONOBASIC 500 MG: 500 TABLET, SOLUBLE ORAL at 16:13

## 2022-11-08 RX ADMIN — POTASSIUM CHLORIDE 10 MEQ: 7.46 INJECTION, SOLUTION INTRAVENOUS at 11:41

## 2022-11-08 RX ADMIN — MAGNESIUM SULFATE HEPTAHYDRATE 2 G: 40 INJECTION, SOLUTION INTRAVENOUS at 10:20

## 2022-11-08 RX ADMIN — POTASSIUM PHOSPHATE, MONOBASIC 500 MG: 500 TABLET, SOLUBLE ORAL at 08:12

## 2022-11-08 RX ADMIN — SODIUM CHLORIDE, PRESERVATIVE FREE 10 ML: 5 INJECTION INTRAVENOUS at 08:11

## 2022-11-08 RX ADMIN — ACETAMINOPHEN 650 MG: 325 TABLET ORAL at 11:50

## 2022-11-08 RX ADMIN — SODIUM BICARBONATE 650 MG: 650 TABLET ORAL at 21:23

## 2022-11-08 RX ADMIN — POTASSIUM CHLORIDE 10 MEQ: 7.46 INJECTION, SOLUTION INTRAVENOUS at 10:20

## 2022-11-08 RX ADMIN — SODIUM CHLORIDE, PRESERVATIVE FREE 10 ML: 5 INJECTION INTRAVENOUS at 13:54

## 2022-11-08 RX ADMIN — AMLODIPINE BESYLATE 5 MG: 5 TABLET ORAL at 08:09

## 2022-11-08 RX ADMIN — SODIUM CHLORIDE, PRESERVATIVE FREE 10 ML: 5 INJECTION INTRAVENOUS at 21:24

## 2022-11-08 RX ADMIN — ATORVASTATIN CALCIUM 20 MG: 20 TABLET, FILM COATED ORAL at 08:09

## 2022-11-08 RX ADMIN — Medication 27 MG: at 16:13

## 2022-11-08 RX ADMIN — SODIUM BICARBONATE 650 MG: 650 TABLET ORAL at 08:09

## 2022-11-08 RX ADMIN — POTASSIUM PHOSPHATE, MONOBASIC 500 MG: 500 TABLET, SOLUBLE ORAL at 21:24

## 2022-11-08 RX ADMIN — Medication 27 MG: at 08:11

## 2022-11-08 NOTE — CONSULTS
Hematology/Oncology Consult    Patient: Vik Yancey MRN: [id number]     YOB: 1955  Age: 78 y.o. Sex: female      HPI      Vik Yancey is a 78 y.o. female who is being seen for esophageal cancer. Ms. 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 Marcus Barrios. Posttreatment scans showed good response. She was admitted with generalized weakness, nausea and vomiting. She was noted to have electrolyte abnormalities including severe hypokalemia and hypomagnesemia which are being replaced by primary team.  She underwent a EGD on 11/7/2022 which showed esophageal stricture/stenosis which was dilated. She feels much better today and was able to have for lunch today.     Past Medical History:   Diagnosis Date    Cancer [facility])      Past Surgical History:   Procedure Laterality Date    HX CATARACT REMOVAL      HX HYSTERECTOMY      HX OOPHORECTOMY      IR CHOLECYSTOSTOMY PERCUTANEOUS      IR INSERT TUNL CVC W PORT OVER 5 YEARS  7/20/2022      Family History   Problem Relation Age of Onset    Breast Cancer Mother      Social History     Tobacco Use    Smoking status: Never    Smokeless tobacco: Never   Substance Use Topics    Alcohol use: Not Currently      Current Facility-Administered Medications   Medication Dose Route Frequency Provider Last Rate Last Admin    [START ON 11/9/2022] pantoprazole (PROTONIX) granules for oral suspension 40 mg  40 mg Per NG tube DAILY Carmelita Shah MD        fentaNYL citrate (PF) injection    PRN Sarina Burr MD   25 mcg at 11/07/22 1137    midazolam (PF) (VERSED) 1 mg/mL injection    PRN Sarina Burr MD   1 mg at 11/07/22 1135    potassium phosphate, monobasic (K-PHOS) tablet 500 mg  1 Tablet Oral TID Carmelita Shah MD   500 mg at 11/08/22 1613    magnesium gluconate tablet 27 mg [elemental]  1 Tablet Oral TID Carmelita Shah MD   27 mg at 11/08/22 1613    famotidine (PF) (PEPCID) 20 mg in 0.9% sodium chloride 10 mL injection  20 mg IntraVENous Q12H PRN Gisell Cam MD        sodium bicarbonate tablet 650 mg  650 mg Per G Tube TID Richar Rene MD   650 mg at 11/08/22 1614    prochlorperazine (COMPAZINE) injection 10 mg  10 mg IntraVENous Q6H PRN Rayo Rojas NP        sodium chloride (NS) flush 5-40 mL  5-40 mL IntraVENous Q8H Dean Bolton MD   10 mL at 11/08/22 1354    sodium chloride (NS) flush 5-40 mL  5-40 mL IntraVENous PRN Dean Bolton MD        acetaminophen (TYLENOL) tablet 650 mg  650 mg Oral Q6H PRN Dean Bolton MD   650 mg at 11/08/22 1150    Or    acetaminophen (TYLENOL) suppository 650 mg  650 mg Rectal Q6H PRN Dean Bolton MD        polyethylene glycol (MIRALAX) packet 17 g  17 g Oral DAILY PRN Dean Bolton MD        ondansetron (ZOFRAN ODT) tablet 4 mg  4 mg Oral Q8H PRN Dean Bolton MD        Or    [facility] injection 4 mg  4 mg IntraVENous Q6H PRN Dean Bolton MD   4 mg at 11/03/22 2338    amLODIPine (NORVASC) tablet 5 mg  5 mg Oral DAILY Dean Bolton MD   5 mg at 11/08/22 0809    atorvastatin (LIPITOR) tablet 20 mg  20 mg Oral DAILY Dean Bolton MD   20 mg at 11/08/22 0809    [Held by provider] lisinopriL (PRINIVIL, ZESTRIL) tablet 10 mg  10 mg Oral DAILY Dean Bolton MD            No Known Allergies    Review of Systems:  Constitutional No fevers, chills, night sweats, excessive fatigue or weight loss. Allergic/Immunologic No recent allergic reactions   Eyes No significant visual difficulties. No diplopia. ENMT No problems with hearing, no sore throat, no sinus drainage. Endocrine No hot flashes or night sweats. No cold intolerance, polyuria, or polydipsia   Hematologic/Lymphatic No easy bruising or bleeding. The patient denies any tender or palpable lymph nodes   Breasts No abnormal masses of breast, nipple discharge or pain.    Respiratory No dyspnea on exertion, orthopnea, chest pain, cough or hemoptysis. Cardiovascular No anginal chest pain, irregular heart beat, tachycardia, palpitations or orthopnea. Gastrointestinal No nausea, vomiting, diarrhea, constipation, cramping, dysphagia, reflux, heartburn, GI bleeding, or early satiety. No change in bowel habits. Genitourinary (M) No hematuria, dysuria, increased frequency, urgency, hesitancy or incontinence. Musculoskeletal No joint pain, swelling or redness. No decreased range of motion. Integumentary No chronic rashes, inflammation, ulcerations, pruritus, petechiae, purpura, ecchymoses, or skin changes. Neurologic No headache, blurred vision, and no areas of focal weakness or numbness. Normal gait. No sensory problems. Psychiatric No insomnia, depression, zainab or mood swings. No psychotropic drugs. Objective:     Vitals:    11/07/22 1607 11/07/22 2035 11/08/22 0812 11/08/22 1456   BP: 139/81 135/87 131/78 (!) 136/91   Pulse: 76 81 78 94   Resp: 23 18 20 17   Temp: 98.3 °F (36.8 °C) 98.4 °F (36.9 °C) 97.9 °F (36.6 °C) 97.5 °F (36.4 °C)   SpO2: 100% 100% 100% 100%   Weight:       Height:            Physical Exam:  Constitutional Alert, cooperative, oriented. Mood and affect appropriate. Appears close to chronological age. Well nourished. Well developed. Head Normocephalic; no scars   Eyes Conjunctivae and sclerae are clear and without icterus. Pupils are reactive and equal.   ENMT Sinuses are nontender. No oral exudates, ulcers, masses, thrush or mucositis. Oropharynx clear. Tongue normal.   Neck Supple without masses or thyromegaly. No jugular venous distension. Hematologic/Lymphatic No petechiae or purpura. No tender or palpable lymph nodes in the cervical, supraclavicular, axillary or inguinal area. Respiratory Lungs are clear to auscultation without rhonchi or wheezing. Cardiovascular Regular rate and rhythm of heart without murmurs, gallops or rubs.    Chest / Line Site Chest is symmetric with no chest wall deformities. Abdomen Non-tender, non-distended, no masses, ascites or hepatosplenomegaly. Good bowel sounds. No guarding or rebound tenderness. No pulsatile masses. Musculoskeletal No tenderness or swelling, normal range of motion without obvious weakness. Extremities No visible deformities, no cyanosis, clubbing or edema. Skin No rashes, scars, or lesions suggestive of malignancy. No petechiae, purpura, or ecchymoses. No excoriations. Neurologic No sensory or motor deficits, normal cerebellar function, normal gait, cranial nerves intact. Psychiatric Alert and oriented times three. Coherent speech. Verbalizes understanding of our discussions today. Lab/Data Review:  Recent Labs     11/08/22  0834 11/07/22  0828 11/06/22  1216   WBC 3.3* 3.7 3.2*   HGB 9.5* 9.1* 9.3*   HCT 28.2* 26.7* 27.3*   PLT 71* 78* 85*     Recent Labs     11/08/22  0834 11/07/22  0828 11/06/22  1216    148* 147*   K 2.9* 3.0* 2.9*   * 113* 115*   CO2 27 27 27   GLU 98 96 102*   BUN 3* 4* 7   CREA 0.48* 0.47* 0.46*   CA 8.1* 7.9* 8.1*   MG 1.5* 1.6 1.7   PHOS 3.0 2.3* 1.6*     No results for input(s): PH, PCO2, PO2, HCO3, FIO2 in the last 72 hours.   Recent Results (from the past 24 hour(s))   GLUCOSE, POC    Collection Time: 11/07/22  8:41 PM   Result Value Ref Range    Glucose (POC) 95 65 - 100 mg/dL    Performed by Ar Wells    MAGNESIUM    Collection Time: 11/08/22  8:34 AM   Result Value Ref Range    Magnesium 1.5 (L) 1.6 - 2.4 mg/dL   PHOSPHORUS    Collection Time: 11/08/22  8:34 AM   Result Value Ref Range    Phosphorus 3.0 2.6 - 4.7 mg/dL   CBC WITH AUTOMATED DIFF    Collection Time: 11/08/22  8:34 AM   Result Value Ref Range    WBC 3.3 (L) 3.6 - 11.0 K/uL    RBC 2.88 (L) 3.80 - 5.20 M/uL    HGB 9.5 (L) 11.5 - 16.0 g/dL    HCT 28.2 (L) 35.0 - 47.0 %    MCV 97.9 80.0 - 99.0 FL    MCH 33.0 26.0 - 34.0 PG    MCHC 33.7 30.0 - 36.5 g/dL    RDW 20.4 (H) 11.5 - 14.5 %    PLATELET 71 (L) 360 - 400 K/uL    MPV 10.3 8.9 - 12.9 FL    NRBC 0.0 0.0  WBC    ABSOLUTE NRBC 0.00 0.00 - 0.01 K/uL    NEUTROPHILS 66 32 - 75 %    LYMPHOCYTES 22 12 - 49 %    MONOCYTES 11 5 - 13 %    EOSINOPHILS 0 0 - 7 %    BASOPHILS 0 0 - 1 %    IMMATURE GRANULOCYTES 1 (H) 0 - 0.5 %    ABS. NEUTROPHILS 2.2 1.8 - 8.0 K/UL    ABS. LYMPHOCYTES 0.7 (L) 0.8 - 3.5 K/UL    ABS. MONOCYTES 0.4 0.0 - 1.0 K/UL    ABS. EOSINOPHILS 0.0 0.0 - 0.4 K/UL    ABS. BASOPHILS 0.0 0.0 - 0.1 K/UL    ABS. IMM. GRANS. 0.0 0.00 - 0.04 K/UL    DF AUTOMATED     METABOLIC PANEL, BASIC    Collection Time: 11/08/22  8:34 AM   Result Value Ref Range    Sodium 145 136 - 145 mmol/L    Potassium 2.9 (L) 3.5 - 5.1 mmol/L    Chloride 111 (H) 97 - 108 mmol/L    CO2 27 21 - 32 mmol/L    Anion gap 7 5 - 15 mmol/L    Glucose 98 65 - 100 mg/dL    BUN 3 (L) 6 - 20 mg/dL    Creatinine 0.48 (L) 0.55 - 1.02 mg/dL    BUN/Creatinine ratio 6 (L) 12 - 20      eGFR >60 >60 ml/min/1.73m2    Calcium 8.1 (L) 8.5 - 10.1 mg/dL        XR CHEST PORT    Result Date: 11/3/2022  No Acute Disease. Assessment and Plan:     Hospital Problems  Never Reviewed            Codes Class Noted POA    Hypokalemia ICD-10-CM: E87.6  ICD-9-CM: 276.8  11/3/2022 Unknown        Metabolic acidosis EMPERATRIZ-02-CZ: E87.20  ICD-9-CM: 276.2  11/3/2022 Unknown           Esophageal cancer:  -Completed chemo RT in 9/2022 with good response  -EGD done on 11/7/2022 did not mention esophageal mass  -Status post dilatation  -Follow-up with Dr. Andrew Workman after discharge. Pancytopenia:  -Hemoglobin of 9.3, platelet of 71 and white count of 3.3  -Most likely secondary to recent treatment as well as nutritional  -Continue supportive care  -No bleeding symptoms    Electrolyte abnormalities:  -Replacement per primary team.    Thank you for the consult.     Signed By: Richy Zepeda MD     November 8, 2022

## 2022-11-08 NOTE — PROGRESS NOTES
OCCUPATIONAL THERAPY TREATMENT  Patient: Theodore Parish (16 y.o. female)  Date: 11/8/2022  Diagnosis: Hypokalemia [R85.2]  Metabolic acidosis [Q73.82] <principal problem not specified>  Procedure(s) (LRB):  ESOPHAGOGASTRODUODENOSCOPY (EGD) (N/A)  ESOPHAGEAL DILATION (N/A) 1 Day Post-Op  Precautions:    Chart, occupational therapy assessment, plan of care, and goals were reviewed. ASSESSMENT  Pt continues with skilled OT services and is progressing towards goals. Upon KILGORE arrival, pt pt sitting in recliner and agreeable to tx session at this time. Overall, pt continues to present with deficits in generalized strength/AROM, coordination, bed mobility, static/dynamic sitting and standing balance and functional activity tolerance during performance of ADLs/mobility (see below for objective details and assist levels). Pt noted with continued improvement in overall mobility. Pt noted with improvement in balance and able to complete standing grooming at sink with overall SBA. Pt returned to recliner with CGA/SBA requiring cueing for proper transfer technique and hand placement. Therapist retrieved fresh ice pack for pt swollen RUE. When therapist returned to room pt in semi supine in bed, pt reporting RN assisted pt to bed. Recommend d/c to Home with Home Health Therapy once medically appropriate. Other factors to consider for discharge: family/social support, DME, time since onset, severity of deficits, decline from functional baseline         PLAN :  Patient continues to benefit from skilled intervention to address the above impairments. Continue treatment per established plan of care to address goals.     Recommendation for discharge: (in order for the patient to meet his/her long term goals)  Home with [address] [place] [address]    This discharge recommendation:  Has been made in collaboration with the attending provider and/or case management    IF patient discharges home will need the following DME: TBD SUBJECTIVE:   Patient stated I will clean my mouth.     OBJECTIVE DATA SUMMARY:   Cognitive/Behavioral Status:  Neurologic State: Alert  Orientation Level: Oriented X4  Cognition: Follows commands    Functional Mobility and Transfers for ADLs:    Transfers:  Sit to Stand: Contact guard assistance  Functional Transfers  Bathroom Mobility: Contact guard assistance    Balance:  Sitting: Intact; Without support  Sitting - Static: Good (unsupported)  Sitting - Dynamic: Good (unsupported)  Standing: Impaired; With support  Standing - Static: Constant support;Good  Standing - Dynamic : Constant support; Fair    ADL Intervention:  Grooming  Position Performed: Standing  Washing Face: Set-up; Stand-by assistance  Brushing Teeth: Set-up; Stand-by assistance    Pain:  0/10    Activity Tolerance:   Fair and requires rest breaks  Please refer to the flowsheet for vital signs taken during this treatment. After treatment patient left in no apparent distress:   Bed locked and in lowest position  Supine in bed, Call bell within reach, Bed / chair alarm activated, and Side rails x 3    COMMUNICATION/COLLABORATION:   The patients plan of care was discussed with: Physical therapy assistant and Certified nursing assistant/patient care technician. JAME Lay  Time Calculation: 32 mins    Problem: Self Care Deficits Care Plan (Adult)  Goal: *Acute Goals and Plan of Care (Insert Text)  Description: FUNCTIONAL STATUS PRIOR TO ADMISSION: Patient was independent and active without use of DME. Patient was independent for basic and instrumental ADLs. HOME SUPPORT: The patient lived alone, daughter nearby. Pt states her daughter does not work and pt may potentially d/c to daughter's house to stay with her for a while.      Occupational Therapy Goals  Initiated 11/4/2022    Pt stated goal \"to go home\"  Pt will be IND sup <> sit in prep for EOB ADLs  Pt will be MI grooming standing sink side LRAD  Pt will be IND UB dressing sitting EOB/long sit   Pt will be MI LE dressing sitting EOB/long sit  Pt will be MI sit <>  prep for toileting LRAD  Pt will be IND toileting/toilet transfer/cloth mgmt LRAD  Pt will be IND following UE HEP in prep for self care tasks      Outcome: Progressing Towards Goal

## 2022-11-08 NOTE — PROGRESS NOTES
Problem: Mobility Impaired (Adult and Pediatric)  Goal: *Acute Goals and Plan of Care (Insert Text)  Description: FUNCTIONAL STATUS PRIOR TO ADMISSION: Patient was independent and active without use of DME.    HOME SUPPORT PRIOR TO ADMISSION: The patient lived  alone and did not require assist.    Physical Therapy Goals  Initiated 11/4/2022  Patient/family stated goal: I want to get stronger  1. Patient will move from supine to sit and sit to supine  in bed with modified independence within 7 day(s). 2.  Patient will transfer from bed to chair and chair to bed with modified independence using the least restrictive device within 7 day(s). 3.  Patient will perform sit to stand with modified independence within 7 day(s). 4.  Patient will ambulate with modified independence for 100 feet with the least restrictive device within 7 day(s). 5.  Patient will participate in lower extremity therapeutic exercise with independence  within 7 day(s). Outcome: Progressing Towards Goal   PHYSICAL THERAPY TREATMENT  Patient: Morris Lanza (7 y.o. female)  Date: 11/8/2022  Diagnosis: Hypokalemia [B69.3]  Metabolic acidosis [O42.48] <principal problem not specified>  Procedure(s) (LRB):  ESOPHAGOGASTRODUODENOSCOPY (EGD) (N/A)  ESOPHAGEAL DILATION (N/A) 1 Day Post-Op  Precautions:    Chart, physical therapy assessment, plan of care and goals were reviewed. ASSESSMENT  Patient continues with skilled PT services and is progressing towards goals. Pt found semi supine in bed upon PT arrival, agreeable to session. (See below for objective details and assist levels). Overall pt tolerated session fair today with bed mobility, transfers and ambulation. Demo's increase in ambulation distances and requires less A with mobility. Pt req min A from commode due to lower surface, able to tolerate from EOB. Pt reports fatigue limiting activity tolerance with ambulation, will attempt to progress at a later time.  Will continue to benefit from skilled PT services, and will continue to progress as tolerated. Current Level of Function Impacting Discharge (mobility/balance): medical     Other factors to consider for discharge: PLOF         PLAN :  Patient continues to benefit from skilled intervention to address the above impairments. Continue treatment per established plan of care to address goals. Recommend with staff: Out of bed to chair for meals, Encourage HEP in prep for ADLs/mobility, Frequent repositioning to prevent skin breakdown, and Amb to bathroom for toileting with gt belt and AD    Recommendation for discharge: (in order for the patient to meet his/her long term goals)  Home with Family [facility] 24x7 and HHPT vs SNF    This discharge recommendation:  Has been made in collaboration with the attending provider and/or case management    IF patient discharges home will need the following DME: to be determined (TBD)       SUBJECTIVE:   Patient stated I'm just ready to go home.     OBJECTIVE DATA SUMMARY:   Critical Behavior:  Neurologic State: Alert  Orientation Level: Oriented X4  Cognition: Follows commands, Appropriate decision making     Functional Mobility Training:  Bed Mobility:  Rolling: Supervision  Supine to Sit: Supervision  Scooting: Supervision  Transfers:  Sit to Stand: Contact guard assistance  Stand to Sit: Contact guard assistance  Balance:  Sitting: Intact; Without support  Sitting - Static: Good (unsupported)  Sitting - Dynamic: Good (unsupported)  Standing: Impaired; With support  Standing - Static: Constant support;Good  Standing - Dynamic : Constant support; Fair  Ambulation/Gait Training:  Distance (ft): 35 Feet (ft)  Assistive Device: Walker, rolling;Gait belt  Gait Abnormalities: Decreased step clearance  Base of Support: Narrowed  Speed/Shelby: Slow      Therapeutic Exercises:       EXERCISE   Sets   Reps   Active Active Assist   Passive Self ROM   Comments   Ankle Pumps   [x] [] [] []    Quad Sets/Glut Sets   [x] [] [] []    Hamstring Sets   [] [] [] []    Short Arc Quads   [] [] [] []    Heel Slides   [] [] [] []    Straight Leg Raises   [] [] [] []    Hip abd/add   [] [] [] []    Long Arc Quads   [x] [] [] []    Marching   [x] [] [] []       [] [] [] []       Pain Rating:  Pt denies pain     Activity Tolerance:   Fair    After treatment patient left in no apparent distress:   Bed locked and returned to lowest position, Sitting in chair and Call bell within reach      COMMUNICATION/COLLABORATION:   The patients plan of care was discussed with: Registered nurse.        Fred Hutchinson PTA, PT   Time Calculation: 35 mins

## 2022-11-08 NOTE — PROGRESS NOTES
*ATTENTION:  This note has been created by a medical student for educational purposes only. Please do not refer to the content of this note for clinical decision-making, billing, or other purposes. Please see attending physicians note to obtain clinical information on this patient. *              Progress Note    Patient: Jessica Gr MRN: [id number]  SSN: [id number]    YOB: 1955  Age: 78 y.o. Sex: female      Admit Date: 11/3/2022    LOS: 5 days     Subjective:     Saw the patient at bedside. She states she is doing well overall, no complaints at this time. The patient has had multiple meals since yesterday with no difficulty swallowing soft foods and small pieces of solids. The patient's potassium is 2.9 today, down from prior, labs drawn prior to repletion. Objective:     Vitals:    11/07/22 1211 11/07/22 1607 11/07/22 2035 11/08/22 0812   BP: 124/74 139/81 135/87 131/78   Pulse: 72 76 81 78   Resp: 16 23 18 20   Temp:  98.3 °F (36.8 °C) 98.4 °F (36.9 °C) 97.9 °F (36.6 °C)   SpO2:  100% 100% 100%   Weight:       Height:            Intake and Output:  Current Shift: No intake/output data recorded. Last three shifts: 11/06 1901 - 11/08 0700  In: 450 [P.O.:350]  Out: 850 [Urine:850]    Physical Exam:   Skin:  Extremities and face reveal no rashes. No beavers erythema. HEENT: Sclerae anicteric. Extra-occular muscles are intact. No abnormal pigmentation of the lips. The neck is supple. Cardiovascular: Regular rate and rhythm. Respiratory:  Comfortable breathing with no accessory muscle use. GI:  Abdomen nondistended, soft, and nontender. No enlargement of the liver or spleen. No masses palpable. Rectal:  Deferred  Musculoskeletal: Extremities have good range of motion. Neurological:  Gross memory appears intact. Patient is alert and oriented. Psychiatric:  Mood appears appropriate with judgement intact.   Lymphatic:  No visible adenopathy      Lab/Data Review:  Recent Results (from the past 24 hour(s))   GLUCOSE, POC    Collection Time: 11/07/22  4:11 PM   Result Value Ref Range    Glucose (POC) 81 65 - 100 mg/dL    Performed by Maria E Talley    GLUCOSE, POC    Collection Time: 11/07/22  8:41 PM   Result Value Ref Range    Glucose (POC) 95 65 - 100 mg/dL    Performed by Reema Riley    MAGNESIUM    Collection Time: 11/08/22  8:34 AM   Result Value Ref Range    Magnesium 1.5 (L) 1.6 - 2.4 mg/dL   PHOSPHORUS    Collection Time: 11/08/22  8:34 AM   Result Value Ref Range    Phosphorus 3.0 2.6 - 4.7 mg/dL   CBC WITH AUTOMATED DIFF    Collection Time: 11/08/22  8:34 AM   Result Value Ref Range    WBC 3.3 (L) 3.6 - 11.0 K/uL    RBC 2.88 (L) 3.80 - 5.20 M/uL    HGB 9.5 (L) 11.5 - 16.0 g/dL    HCT 28.2 (L) 35.0 - 47.0 %    MCV 97.9 80.0 - 99.0 FL    MCH 33.0 26.0 - 34.0 PG    MCHC 33.7 30.0 - 36.5 g/dL    RDW 20.4 (H) 11.5 - 14.5 %    PLATELET 71 (L) 286 - 400 K/uL    MPV 10.3 8.9 - 12.9 FL    NRBC 0.0 0.0  WBC    ABSOLUTE NRBC 0.00 0.00 - 0.01 K/uL    NEUTROPHILS 66 32 - 75 %    LYMPHOCYTES 22 12 - 49 %    MONOCYTES 11 5 - 13 %    EOSINOPHILS 0 0 - 7 %    BASOPHILS 0 0 - 1 %    IMMATURE GRANULOCYTES 1 (H) 0 - 0.5 %    ABS. NEUTROPHILS 2.2 1.8 - 8.0 K/UL    ABS. LYMPHOCYTES 0.7 (L) 0.8 - 3.5 K/UL    ABS. MONOCYTES 0.4 0.0 - 1.0 K/UL    ABS. EOSINOPHILS 0.0 0.0 - 0.4 K/UL    ABS. BASOPHILS 0.0 0.0 - 0.1 K/UL    ABS. IMM. GRANS. 0.0 0.00 - 0.04 K/UL    DF AUTOMATED     METABOLIC PANEL, BASIC    Collection Time: 11/08/22  8:34 AM   Result Value Ref Range    Sodium 145 136 - 145 mmol/L    Potassium 2.9 (L) 3.5 - 5.1 mmol/L    Chloride 111 (H) 97 - 108 mmol/L    CO2 27 21 - 32 mmol/L    Anion gap 7 5 - 15 mmol/L    Glucose 98 65 - 100 mg/dL    BUN 3 (L) 6 - 20 mg/dL    Creatinine 0.48 (L) 0.55 - 1.02 mg/dL    BUN/Creatinine ratio 6 (L) 12 - 20      eGFR >60 >60 ml/min/1.73m2    Calcium 8.1 (L) 8.5 - 10.1 mg/dL              XR CHEST PORT   Final Result   No Acute Disease.               Assessment: Active Problems:    Hypokalemia (04/1/9774)      Metabolic acidosis (83/5/9282)      Plan:     EGD done yesterday with dilatation of the esophagus. Dysphagia seems improved based on patient report. Plan to have the patient follow-up in clinic in 4 weeks. Continue Protonix  Oncology consult pending  Will continue monitoring labs including potassium level. Replete potassium as needed. Thank you for allowing me to participate in this patients care    Signed By: Melodie Tanner     November 8, 2022    Patient seen and examined agree with impression and plan.   Med Hogan MD

## 2022-11-08 NOTE — PROGRESS NOTES
Problem: Falls - Risk of  Goal: *Absence of Falls  Description: Document Godwin Nichols Fall Risk and appropriate interventions in the flowsheet. Outcome: Progressing Towards Goal  Note: Fall Risk Interventions:  Mobility Interventions: Bed/chair exit alarm         Medication Interventions: Patient to call before getting OOB    Elimination Interventions: Call light in reach              Problem: Patient Education: Go to Patient Education Activity  Goal: Patient/Family Education  Outcome: Progressing Towards Goal     Problem: Pressure Injury - Risk of  Goal: *Prevention of pressure injury  Description: Document Wesly Scale and appropriate interventions in the flowsheet.   Outcome: Progressing Towards Goal  Note: Pressure Injury Interventions:  Sensory Interventions: Keep linens dry and wrinkle-free    Moisture Interventions: Limit adult briefs    Activity Interventions: PT/OT evaluation    Mobility Interventions: PT/OT evaluation    Nutrition Interventions: Document food/fluid/supplement intake    Friction and Shear Interventions: Minimize layers                Problem: Patient Education: Go to Patient Education Activity  Goal: Patient/Family Education  Outcome: Progressing Towards Goal     Problem: Aspiration - Risk of  Goal: *Absence of aspiration  Outcome: Progressing Towards Goal     Problem: Patient Education: Go to Patient Education Activity  Goal: Patient/Family Education  Outcome: Progressing Towards Goal     Problem: Nausea/Vomiting (Adult)  Goal: *Absence of nausea/vomiting  Outcome: Progressing Towards Goal  Goal: *Palliation of nausea/vomiting (Palliative Care)  Outcome: Progressing Towards Goal     Problem: Patient Education: Go to Patient Education Activity  Goal: Patient/Family Education  Outcome: Progressing Towards Goal     Problem: Patient Education: Go to Patient Education Activity  Goal: Patient/Family Education  Outcome: Progressing Towards Goal     Problem: Patient Education: Go to Patient Education Activity  Goal: Patient/Family Education  Outcome: Progressing Towards Goal

## 2022-11-08 NOTE — PROGRESS NOTES
Hospitalist Progress Note    Daily Progress Note: 11/8/2022 2:10 PM    Assessment/Plan     78 AA Female with Esophageal Cancer and has completed XRT + Chemo with:     # Critical Life-threatening Hypokalemia  # SEBAS with Metabolic Acidosis; resolved  # Hx HTN  # N/V --> S/p EGD 11/7/22: Esophageal Stricture/Dilated; Gastritis  # Pancytopenia     => S/p IVF with NaHCO3  => Repeat labs; cont replacing K+, Mg++ - still low despite multiple repletions and scheduled dosing  => PPI daily  => Renal/Damodar following  => Hold ACE-I  => Reviewed home meds in Epic  => Free water 100cc 3x daily via PEG     Disposition Status: Continue Inpatient Mgmt; SNF   DVT Prophylaxis: Lovenox  GI Prophylaxis: Protonix  Code Status: FULL  POA: Self/Daughter  Advanced Directive Discussion: N/A  -----------------------------------------------------    Admission Hx/HPI:  Abel Muñiz is a 78 y.o. female who follows with Dr. Shruthi Darling for Esophageal CA and is s/p XRT and Chemo over past 2 months with increasing fatigue, n/v. Outpatient w/up showed low potassium for which she was prescribed replacement and she took. However, d/t persistent weakness, she came to ER. She is accompanied by her daughter at bedside. Patient lives by herself. + Sob, cough. No definite fever. In ER, K+ was 2.0 with CO2 of 12. Replacement as started. ----------------------------------------    Subjective:  No new issue; eating without problems. Denies recurrent n/v, sob, CP. Per Nutrition:    Advance diet when medically appropriate to Puree   Consult SLP for diet texture/consistency   When able Restart Feeding via Peg (per home regimen) of Two Alok 237 ml   Bolus 4 x day   4.   Flush tube with 180 ml water q 4 hours  This will provide 1896 kcal (100%), 79 g pro (85%), 1744 ml (97%)  Monitor diet advancement, initiation of enteral feeding, I/O's, weight     Per Physical Therapist: SNF      Review of Systems    Constitutional: No fevers, No chills, No night sweats, + fatigue/weakness, No significant weight change  Eyes: No visual disturbance, No loss of vision  HENT: No nasal congestion, No sore throat, No head lesion, No hearing deficit  Respiratory: No cough, No sputum, No wheezing, No SOB, No hemoptysis, No pleuritic CP  Cardiovascular: No chest pain, No lower extremity edema, No palpitations, No PND, No orthopnea, No JIANG  Gastrointestinal: resolved nausea/vomiting, No diarrhea, No constipation, No abdominal pain, No Melena, No hematemesis, No BRBPR.  PEG  Genitourinary: No frequency, No dysuria, No hematuria, No discharge  Integument: No rash, No new skin lesion(s), No dryness, No new palpable nodule  Musculoskeletal: No arthralgias, No neck pain, No back pain, No joint pain, No fall or injury  Neurological: No headaches, No dizziness, No confusion,  No seizures, No focal weakness, No LOC, No paresthesia  Heme/Onc: No overt bleeding noted, No obvious swollen glands  Behavioral/Psychiatric: No change in mood; no SI; no hallucination      Current Facility-Administered Medications   Medication Dose Route Frequency    potassium chloride 10 mEq in 100 ml IVPB  10 mEq IntraVENous Q1H    magnesium sulfate 2 g/50 ml IVPB (premix or compounded)  2 g IntraVENous ONCE    [START ON 11/9/2022] pantoprazole (PROTONIX) granules for oral suspension 40 mg  40 mg Per NG tube DAILY    fentaNYL citrate (PF) injection    PRN    midazolam (PF) (VERSED) 1 mg/mL injection    PRN    potassium phosphate, monobasic (K-PHOS) tablet 500 mg  1 Tablet Oral TID    magnesium gluconate tablet 27 mg [elemental]  1 Tablet Oral TID    famotidine (PF) (PEPCID) 20 mg in 0.9% sodium chloride 10 mL injection  20 mg IntraVENous Q12H PRN    sodium bicarbonate tablet 650 mg  650 mg Per G Tube TID    prochlorperazine (COMPAZINE) injection 10 mg  10 mg IntraVENous Q6H PRN    sodium chloride (NS) flush 5-40 mL  5-40 mL IntraVENous Q8H    sodium chloride (NS) flush 5-40 mL  5-40 mL IntraVENous PRN    acetaminophen (TYLENOL) tablet 650 mg  650 mg Oral Q6H PRN    Or    acetaminophen (TYLENOL) suppository 650 mg  650 mg Rectal Q6H PRN    polyethylene glycol (MIRALAX) packet 17 g  17 g Oral DAILY PRN    ondansetron (ZOFRAN ODT) tablet 4 mg  4 mg Oral Q8H PRN    Or    ondansetron (ZOFRAN) injection 4 mg  4 mg IntraVENous Q6H PRN    amLODIPine (NORVASC) tablet 5 mg  5 mg Oral DAILY    atorvastatin (LIPITOR) tablet 20 mg  20 mg Oral DAILY    [Held by provider] lisinopriL (PRINIVIL, ZESTRIL) tablet 10 mg  10 mg Oral DAILY       Objective     Visit Vitals  /78 (BP 1 Location: Left upper arm, BP Patient Position: At rest)   Pulse 78   Temp 97.9 °F (36.6 °C)   Resp 20   Ht 5' 6\" (1.676 m)   Wt 71.8 kg (158 lb 4.8 oz)   SpO2 100%   Breastfeeding No   BMI 25.55 kg/m²      O2 Device: None (Room air)    Temp (24hrs), Av °F (36.7 °C), Min:97.2 °F (36.2 °C), Max:98.4 °F (36.9 °C)      No intake/output data recorded.  1901 -  0700  In: 450 [P.O.:350]  Out: 850 [Urine:850]      PHYSICAL EXAM    General: Awake and responsive, NAD; sitting in chair  Neck: Supple, radiation scarring and inflammatory changes  Vascular: No carotid bruit, palpable pulses bilat  Lung: CTA bilat, vesicular breath sounds  Heart: S1S2, No significant murmur appreciated; Sinus on Tele  Abdomen: Soft, NT, No rigidity, No rebound, Bowel sounds +; PEG in place  Extremities: No edema  M/S: No traumatic change, active mobility noted  Skin: Keloid scar upper chest  Neurologic: No overt focal motor deficit. AxOx3.    Psychiatric: Coherent and age appropriate affect; good spiritis      Data Review    Recent Results (from the past 24 hour(s))   GLUCOSE, POC    Collection Time: 22  4:11 PM   Result Value Ref Range    Glucose (POC) 81 65 - 100 mg/dL    Performed by Familia Merritt    GLUCOSE, POC    Collection Time: 22  8:41 PM   Result Value Ref Range    Glucose (POC) 95 65 - 100 mg/dL    Performed by Royal Ch    [facility]    Collection Time: 11/08/22  8:34 AM   Result Value Ref Range    Magnesium 1.5 (L) 1.6 - 2.4 mg/dL   PHOSPHORUS    Collection Time: 11/08/22  8:34 AM   Result Value Ref Range    Phosphorus 3.0 2.6 - 4.7 mg/dL   CBC WITH AUTOMATED DIFF    Collection Time: 11/08/22  8:34 AM   Result Value Ref Range    WBC 3.3 (L) 3.6 - 11.0 K/uL    RBC 2.88 (L) 3.80 - 5.20 M/uL    HGB 9.5 (L) 11.5 - 16.0 g/dL    HCT 28.2 (L) 35.0 - 47.0 %    MCV 97.9 80.0 - 99.0 FL    MCH 33.0 26.0 - 34.0 PG    MCHC 33.7 30.0 - 36.5 g/dL    RDW 20.4 (H) 11.5 - 14.5 %    PLATELET 71 (L) 891 - 400 K/uL    MPV 10.3 8.9 - 12.9 FL    NRBC 0.0 0.0  WBC    ABSOLUTE NRBC 0.00 0.00 - 0.01 K/uL    NEUTROPHILS 66 32 - 75 %    LYMPHOCYTES 22 12 - 49 %    MONOCYTES 11 5 - 13 %    EOSINOPHILS 0 0 - 7 %    BASOPHILS 0 0 - 1 %    IMMATURE GRANULOCYTES 1 (H) 0 - 0.5 %    ABS. NEUTROPHILS 2.2 1.8 - 8.0 K/UL    ABS. LYMPHOCYTES 0.7 (L) 0.8 - 3.5 K/UL    ABS. MONOCYTES 0.4 0.0 - 1.0 K/UL    ABS. EOSINOPHILS 0.0 0.0 - 0.4 K/UL    ABS. BASOPHILS 0.0 0.0 - 0.1 K/UL    ABS. IMM.  GRANS. 0.0 0.00 - 0.04 K/UL    DF AUTOMATED     METABOLIC PANEL, BASIC    Collection Time: 11/08/22  8:34 AM   Result Value Ref Range    Sodium 145 136 - 145 mmol/L    Potassium 2.9 (L) 3.5 - 5.1 mmol/L    Chloride 111 (H) 97 - 108 mmol/L    CO2 27 21 - 32 mmol/L    Anion gap 7 5 - 15 mmol/L    Glucose 98 65 - 100 mg/dL    BUN 3 (L) 6 - 20 mg/dL    Creatinine 0.48 (L) 0.55 - 1.02 mg/dL    BUN/Creatinine ratio 6 (L) 12 - 20      eGFR >60 >60 ml/min/1.73m2    Calcium 8.1 (L) 8.5 - 10.1 mg/dL       XR CHEST PORT   Final Result   No Acute Disease.              ________________________________________  Johann Ramires MD

## 2022-11-09 LAB
ALBUMIN SERPL-MCNC: 2.6 G/DL (ref 3.5–5)
ANION GAP SERPL CALC-SCNC: 6 MMOL/L (ref 5–15)
BASOPHILS # BLD: 0 K/UL (ref 0–0.1)
BASOPHILS NFR BLD: 0 % (ref 0–1)
BUN SERPL-MCNC: 4 MG/DL (ref 6–20)
BUN/CREAT SERPL: 9 (ref 12–20)
CA-I BLD-MCNC: 8.2 MG/DL (ref 8.5–10.1)
CHLORIDE SERPL-SCNC: 108 MMOL/L (ref 97–108)
CO2 SERPL-SCNC: 27 MMOL/L (ref 21–32)
CREAT SERPL-MCNC: 0.44 MG/DL (ref 0.55–1.02)
DIFFERENTIAL METHOD BLD: ABNORMAL
EOSINOPHIL # BLD: 0 K/UL (ref 0–0.4)
EOSINOPHIL NFR BLD: 0 % (ref 0–7)
ERYTHROCYTE [DISTWIDTH] IN BLOOD BY AUTOMATED COUNT: 19.8 % (ref 11.5–14.5)
GLUCOSE SERPL-MCNC: 86 MG/DL (ref 65–100)
HCT VFR BLD AUTO: 28.1 % (ref 35–47)
HGB BLD-MCNC: 9.7 G/DL (ref 11.5–16)
IMM GRANULOCYTES # BLD AUTO: 0 K/UL (ref 0–0.04)
IMM GRANULOCYTES NFR BLD AUTO: 1 % (ref 0–0.5)
LYMPHOCYTES # BLD: 0.6 K/UL (ref 0.8–3.5)
LYMPHOCYTES NFR BLD: 21 % (ref 12–49)
MAGNESIUM SERPL-MCNC: 1.7 MG/DL (ref 1.6–2.4)
MAGNESIUM SERPL-MCNC: 1.7 MG/DL (ref 1.6–2.4)
MCH RBC QN AUTO: 33.8 PG (ref 26–34)
MCHC RBC AUTO-ENTMCNC: 34.5 G/DL (ref 30–36.5)
MCV RBC AUTO: 97.9 FL (ref 80–99)
MONOCYTES # BLD: 0.4 K/UL (ref 0–1)
MONOCYTES NFR BLD: 16 % (ref 5–13)
NEUTS SEG # BLD: 1.6 K/UL (ref 1.8–8)
NEUTS SEG NFR BLD: 62 % (ref 32–75)
NRBC # BLD: 0 K/UL (ref 0–0.01)
NRBC BLD-RTO: 0 PER 100 WBC
PHOSPHATE SERPL-MCNC: 2.9 MG/DL (ref 2.6–4.7)
PLATELET # BLD AUTO: 69 K/UL (ref 150–400)
PMV BLD AUTO: 11.5 FL (ref 8.9–12.9)
POTASSIUM SERPL-SCNC: 3 MMOL/L (ref 3.5–5.1)
RBC # BLD AUTO: 2.87 M/UL (ref 3.8–5.2)
SODIUM SERPL-SCNC: 141 MMOL/L (ref 136–145)
WBC # BLD AUTO: 2.6 K/UL (ref 3.6–11)

## 2022-11-09 PROCEDURE — 74011250636 HC RX REV CODE- 250/636: Performed by: INTERNAL MEDICINE

## 2022-11-09 PROCEDURE — 80069 RENAL FUNCTION PANEL: CPT

## 2022-11-09 PROCEDURE — 97530 THERAPEUTIC ACTIVITIES: CPT

## 2022-11-09 PROCEDURE — 74011250637 HC RX REV CODE- 250/637: Performed by: INTERNAL MEDICINE

## 2022-11-09 PROCEDURE — 36415 COLL VENOUS BLD VENIPUNCTURE: CPT

## 2022-11-09 PROCEDURE — 83735 ASSAY OF MAGNESIUM: CPT

## 2022-11-09 PROCEDURE — 85025 COMPLETE CBC W/AUTO DIFF WBC: CPT

## 2022-11-09 PROCEDURE — 74011000250 HC RX REV CODE- 250: Performed by: INTERNAL MEDICINE

## 2022-11-09 PROCEDURE — 65270000029 HC RM PRIVATE

## 2022-11-09 RX ORDER — POTASSIUM CHLORIDE 7.45 MG/ML
10 INJECTION INTRAVENOUS
Status: COMPLETED | OUTPATIENT
Start: 2022-11-09 | End: 2022-11-09

## 2022-11-09 RX ORDER — MAGNESIUM SULFATE 1 G/100ML
1 INJECTION INTRAVENOUS ONCE
Status: COMPLETED | OUTPATIENT
Start: 2022-11-09 | End: 2022-11-09

## 2022-11-09 RX ORDER — POTASSIUM CHLORIDE 7.45 MG/ML
10 INJECTION INTRAVENOUS
Status: DISPENSED | OUTPATIENT
Start: 2022-11-09 | End: 2022-11-09

## 2022-11-09 RX ORDER — POTASSIUM CHLORIDE 1.5 G/1.77G
40 POWDER, FOR SOLUTION ORAL
Status: COMPLETED | OUTPATIENT
Start: 2022-11-09 | End: 2022-11-09

## 2022-11-09 RX ADMIN — POTASSIUM CHLORIDE 10 MEQ: 7.46 INJECTION, SOLUTION INTRAVENOUS at 19:52

## 2022-11-09 RX ADMIN — SODIUM CHLORIDE, PRESERVATIVE FREE 10 ML: 5 INJECTION INTRAVENOUS at 22:18

## 2022-11-09 RX ADMIN — POTASSIUM CHLORIDE 10 MEQ: 7.46 INJECTION, SOLUTION INTRAVENOUS at 15:53

## 2022-11-09 RX ADMIN — PANTOPRAZOLE SODIUM 40 MG: 40 GRANULE, DELAYED RELEASE ORAL at 11:02

## 2022-11-09 RX ADMIN — Medication 27 MG: at 10:59

## 2022-11-09 RX ADMIN — Medication 27 MG: at 22:18

## 2022-11-09 RX ADMIN — POTASSIUM PHOSPHATE, MONOBASIC 500 MG: 500 TABLET, SOLUBLE ORAL at 10:58

## 2022-11-09 RX ADMIN — POTASSIUM CHLORIDE 10 MEQ: 7.46 INJECTION, SOLUTION INTRAVENOUS at 22:18

## 2022-11-09 RX ADMIN — POTASSIUM CHLORIDE 10 MEQ: 7.46 INJECTION, SOLUTION INTRAVENOUS at 18:45

## 2022-11-09 RX ADMIN — Medication 27 MG: at 18:49

## 2022-11-09 RX ADMIN — POTASSIUM CHLORIDE 40 MEQ: 1.5 FOR SOLUTION ORAL at 14:40

## 2022-11-09 RX ADMIN — SODIUM BICARBONATE 650 MG: 650 TABLET ORAL at 10:58

## 2022-11-09 RX ADMIN — MAGNESIUM SULFATE 1 G: 1 INJECTION INTRAVENOUS at 14:41

## 2022-11-09 RX ADMIN — AMLODIPINE BESYLATE 5 MG: 5 TABLET ORAL at 10:59

## 2022-11-09 RX ADMIN — SODIUM CHLORIDE, PRESERVATIVE FREE 10 ML: 5 INJECTION INTRAVENOUS at 14:42

## 2022-11-09 RX ADMIN — ATORVASTATIN CALCIUM 20 MG: 20 TABLET, FILM COATED ORAL at 10:59

## 2022-11-09 NOTE — PROGRESS NOTES
Problem: Mobility Impaired (Adult and Pediatric)  Goal: *Acute Goals and Plan of Care (Insert Text)  Description: FUNCTIONAL STATUS PRIOR TO ADMISSION: Patient was independent and active without use of DME.    HOME SUPPORT PRIOR TO ADMISSION: The patient lived  alone and did not require assist.    Physical Therapy Goals  Initiated 11/4/2022  Patient/family stated goal: I want to get stronger  1. Patient will move from supine to sit and sit to supine  in bed with modified independence within 7 day(s). 2.  Patient will transfer from bed to chair and chair to bed with modified independence using the least restrictive device within 7 day(s). 3.  Patient will perform sit to stand with modified independence within 7 day(s). 4.  Patient will ambulate with modified independence for 100 feet with the least restrictive device within 7 day(s). 5.  Patient will participate in lower extremity therapeutic exercise with independence  within 7 day(s). Outcome: Progressing Towards Goal   PHYSICAL THERAPY TREATMENT  Patient: Morris Lnaza (85 y.o. female)  Date: 11/9/2022  Diagnosis: Hypokalemia [R11.5]  Metabolic acidosis [X25.34] <principal problem not specified>  Procedure(s) (LRB):  ESOPHAGOGASTRODUODENOSCOPY (EGD) (N/A)  ESOPHAGEAL DILATION (N/A) 2 Days Post-Op  Precautions:    Chart, physical therapy assessment, plan of care and goals were reviewed. ASSESSMENT  Patient continues with skilled PT services and is progressing towards goals. Pt found semi supine in bed upon PT arrival, agreeable to session. (See below for objective details and assist levels). Overall pt tolerated session well today with transfers, ambulation and therex. Pt demo's significant progress towards all goals and req less A with mobility. Pt demo's increase in kelin, gait pattern, and ambulation distances demo's steadiness, no overt LOB. Pt denies pain discomfort or fatigue, no SOB, increased WOB noted.  Will continue to benefit from skilled PT services, and will continue to progress as tolerated. Current Level of Function Impacting Discharge (mobility/balance): medical condition     Other factors to consider for discharge: PLOF         PLAN :  Patient continues to benefit from skilled intervention to address the above impairments. Continue treatment per established plan of care to address goals. Recommend with staff: Encourage HEP in prep for ADLs/mobility and Amb in hallway    Recommendation for discharge: (in order for the patient to meet his/her long term goals)  Alex strauss RASHAAD with 24 x 7 care    This discharge recommendation:  Has been made in collaboration with the attending provider and/or case management    IF patient discharges home will need the following DME: Rolling walker       SUBJECTIVE:   Patient stated I wouldn't be walking if I didn't feel good.     OBJECTIVE DATA SUMMARY:   Critical Behavior:  Neurologic State: Alert  Orientation Level: Oriented X4  Cognition: Follows commands, Appropriate decision making, Appropriate safety awareness     Functional Mobility Training:  Bed Mobility:  Rolling: Modified independent  Supine to Sit: Modified independent  Scooting: Modified independent  Transfers:  Sit to Stand: Stand-by assistance  Stand to Sit: Stand-by assistance  Balance:  Sitting: Intact; Without support  Sitting - Static: Good (unsupported)  Sitting - Dynamic: Good (unsupported)  Standing: Intact; Without support  Standing - Static: Good  Standing - Dynamic : Good;Constant support  Ambulation/Gait Training:  Distance (ft): 215 Feet (ft)  Assistive Device: Gait belt;Walker, rolling  Ambulation - Level of Assistance: Stand-by assistance  Gait Abnormalities: Decreased step clearance  Base of Support: Narrowed    Therapeutic Exercises:       EXERCISE   Sets   Reps   Active Active Assist   Passive Self ROM   Comments   Ankle Pumps   [x] [] [] []    Quad Sets/Glut Sets   [] [] [] []    Hamstring Sets   [] [] [] []    Short Arc Quads   [] [] [] []    Heel Slides   [] [] [] []    Straight Leg Raises   [] [] [] []    Hip abd/add   [x] [] [] []    Long Arc Quads   [x] [] [] []    Marching   [x] [] [] []       [] [] [] []       Pain Rating:  Pt denies pain     Activity Tolerance:   Good and Fair    After treatment patient left in no apparent distress:   Bed locked and returned to lowest position, Sitting in chair and Call bell within reach      COMMUNICATION/COLLABORATION:   The patients plan of care was discussed with: Registered nurse.        Julian Berrios PTA, PT   Time Calculation: 16 mins

## 2022-11-09 NOTE — PROGRESS NOTES
Renal Consult Note    Admit Date: 11/3/2022      HPI:   Seen and examined follow-up\  She is eating and drinking well. No diarrhea. No constipation  No abdominal pain  Does not report cramps at this time.     No palpitations    Current Facility-Administered Medications   Medication Dose Route Frequency    potassium chloride 10 mEq in 100 ml IVPB  10 mEq IntraVENous Q1H    pantoprazole (PROTONIX) granules for oral suspension 40 mg  40 mg Per NG tube DAILY    fentaNYL citrate (PF) injection    PRN    midazolam (PF) (VERSED) 1 mg/mL injection    PRN    magnesium gluconate tablet 27 mg [elemental]  1 Tablet Oral TID    famotidine (PF) (PEPCID) 20 mg in 0.9% sodium chloride 10 mL injection  20 mg IntraVENous Q12H PRN    prochlorperazine (COMPAZINE) injection 10 mg  10 mg IntraVENous Q6H PRN    sodium chloride (NS) flush 5-40 mL  5-40 mL IntraVENous Q8H    sodium chloride (NS) flush 5-40 mL  5-40 mL IntraVENous PRN    acetaminophen (TYLENOL) tablet 650 mg  650 mg Oral Q6H PRN    Or    acetaminophen (TYLENOL) suppository 650 mg  650 mg Rectal Q6H PRN    polyethylene glycol (MIRALAX) packet 17 g  17 g Oral DAILY PRN    ondansetron (ZOFRAN ODT) tablet 4 mg  4 mg Oral Q8H PRN    Or    ondansetron (ZOFRAN) injection 4 mg  4 mg IntraVENous Q6H PRN    amLODIPine (NORVASC) tablet 5 mg  5 mg Oral DAILY    atorvastatin (LIPITOR) tablet 20 mg  20 mg Oral DAILY    lisinopriL (PRINIVIL, ZESTRIL) tablet 10 mg  10 mg Oral DAILY        Past Medical History:   Diagnosis Date    Cancer ([facility] [address]       Past Surgical History:   Procedure Laterality Date    HX CATARACT REMOVAL      HX HYSTERECTOMY      HX OOPHORECTOMY      IR CHOLECYSTOSTOMY PERCUTANEOUS      IR INSERT TUNL CVC W PORT OVER 5 YEARS  7/20/2022     Family History   Problem Relation Age of Onset    Breast Cancer Mother       Social History     Tobacco Use    Smoking status: Never    Smokeless tobacco: Never   Substance Use Topics    Alcohol use: Not Currently         Review of Systems    Review of Systems   Constitutional:  Negative for fever. Respiratory:  Negative for cough and shortness of breath. Gastrointestinal:  Negative for abdominal pain. Neurological:  Negative for headaches. Physical Exam:     Physical Exam  Cardiovascular:      Rate and Rhythm: Regular rhythm. Pulmonary:      Breath sounds: Normal breath sounds. Abdominal:      General: Bowel sounds are normal.      Palpations: Abdomen is soft. Comments: PEG tube present   Musculoskeletal:         General: No swelling. Neurological:      Mental Status: She is alert. Hyperpigmentation her on the neck since radiation therapy. No edema. Next  Alert oriented x3  No neuropsych abnormalities    Patient Vitals for the past 8 hrs:   BP Temp Pulse Resp SpO2 Height   11/09/22 1438 131/76 98.7 °F (37.1 °C) 72 18 100 % --   11/09/22 1241 -- -- -- -- -- 5' 5.98\" (1.676 m)       No intake/output data recorded. 11/07 1901 - 11/09 0700  In: 750 [P.O.:650]  Out: 500 [Urine:500]          XR CHEST PORT   Final Result   No Acute Disease. Data Review   Recent Labs     11/09/22  1136 11/08/22  0834 11/07/22  0828   WBC 2.6* 3.3* 3.7   HGB 9.7* 9.5* 9.1*   HCT 28.1* 28.2* 26.7*   PLT 69* 71* 78*       Recent Labs     11/09/22  1136 11/08/22  0834 11/07/22  0828    145 148*   K 3.0* 2.9* 3.0*    111* 113*   CO2 27 27 27   GLU 86 98 96   BUN 4* 3* 4*   CREA 0.44* 0.48* 0.47*   CA 8.2* 8.1* 7.9*   MG 1.7  1.7 1.5* 1.6   PHOS 2.9 3.0 2.3*   ALB 2.6*  --   --        No components found for: GLPOC  No results for input(s): PH, PCO2, PO2, HCO3, FIO2 in the last 72 hours. No results for input(s): INR, INREXT, INREXT, INREXT in the last 72 hours. Assessment:           Active Problems:    Hypokalemia (92/3/7123)      Metabolic acidosis (57/4/0998)  Hypokalemia, moderate     Hypertension, essential, under fairly good control.     Anemia    Thrombocytopenia- Plt 70    Esophageal cancer s/p radiation and chemotherapy    Plan: We will give 40 mEq KCl by mouth-in liquid form and 4 runs of 10 mEq KCl to be given. Magnesium is corrected, and there should be no renal potassium wasting. Will check potassium again in the morning. Probably would need to discharge her on liquid KCl 20 mEq twice a day. Would need a BMP check in 5 days of discharge.   Thank you for this consult

## 2022-11-09 NOTE — PROGRESS NOTES
Spoke with nephrology. MD clarified that only 4 rounds of IV potassium is to be administered of the 6.

## 2022-11-09 NOTE — PROGRESS NOTES
OCCUPATIONAL THERAPY TREATMENT  Patient: Aleksandr Malagon (75 y.o. female)  Date: 11/9/2022  Diagnosis: Hypokalemia [T82.6]  Metabolic acidosis [Z17.56] <principal problem not specified>  Procedure(s) (LRB):  ESOPHAGOGASTRODUODENOSCOPY (EGD) (N/A)  ESOPHAGEAL DILATION (N/A) 2 Days Post-Op  Precautions: fall risk    Chart, occupational therapy assessment, plan of care, and goals were reviewed. ASSESSMENT  Pt continues with skilled OT services and is progressing towards goals. Pt received semi-supine in bed upon arrival, AXO x4, and agreeable to OT tx at this time despite report she worked with PT earlier in the day. RN ok'd mobility at this time. Overall, pt continues to present with deficits in generalized strength/AROM, standing balance, and functional activity tolerance during performance of ADLs/mobility (see below for objective details and assist levels). Pt continues to progress with therapy. She tolerated ambulating within room and completing bathroom commode transfer w/ CGA/SBA; no LOB or unsteadiness. Pt did req cues for walker navigation during turns and for hand placement for safe transfer. She tolerated sitting at EOB for ~3-5 mins while completing BUE exs w/ good sitting balance (see note below for further details). Will continue to progress. Frequency reduced from 5x/wk to 3x/wk to match pt's progress. Recommend d/c home w/ family care and HHOT once medically appropriate. Other factors to consider for discharge: time since onset, severity of deficits         PLAN :  Patient continues to benefit from skilled intervention to address the above impairments. Continue treatment per established plan of care. to address goals.     Recommend with staff: Amb to bathroom for toileting with gt belt and AD    Recommend next session: Standing grooming    Recommendation for discharge: (in order for the patient to meet his/her long term goals)  Home with [facility] and [facility]    This discharge recommendation:  Has been made in collaboration with the attending provider and/or case management    IF patient discharges home will need the following DME: RW       SUBJECTIVE:   Patient stated I am ready to go home.     OBJECTIVE DATA SUMMARY:   Cognitive/Behavioral Status:  Neurologic State: Alert  Orientation Level: Oriented X4  Cognition: Follows commands             Functional Mobility and Transfers for ADLs:  Bed Mobility:  Rolling: Modified independent  Supine to Sit: Modified independent  Scooting: Modified independent    Transfers:  Sit to Stand: Stand-by assistance;Contact guard assistance  Functional Transfers  Bathroom Mobility: Stand-by assistance;Contact guard assistance  Toilet Transfer : Stand-by assistance;Contact guard assistance       Balance:  Sitting: Intact; Without support  Sitting - Static: Good (unsupported)  Sitting - Dynamic: Good (unsupported)  Standing: Intact; Without support  Standing - Static: Good  Standing - Dynamic : Good;Occasional    ADL Intervention:                                Toileting  Bladder Hygiene: Independent (sitting on commode)         Therapeutic Exercises:   Exercise Sets Reps AROM AAROM PROM Self PROM Comments   Shoulder flex/ext 1 10 [x] [] [] []    Elbow flex/ext 1 10 [x] [] [] []    Forward chest press 1 10 [x] [] [] []        Pain:  No reports of pain    Activity Tolerance:   Good and tolerates ADLs without rest breaks    After treatment patient left in no apparent distress:   Supine in bed, Call bell within reach, Bed / chair alarm activated, and Side rails x 3, bed locked and in lowest position    COMMUNICATION/COLLABORATION:   The patients plan of care was discussed with: Registered nurse. Angelito Samson OT  Time Calculation: 15 mins    Problem: Self Care Deficits Care Plan (Adult)  Goal: *Acute Goals and Plan of Care (Insert Text)  Description: FUNCTIONAL STATUS PRIOR TO ADMISSION: Patient was independent and active without use of DME.  Patient was independent for basic and instrumental ADLs. HOME SUPPORT: The patient lived alone, daughter nearby. Pt states her daughter does not work and pt may potentially d/c to daughter's house to stay with her for a while.      Occupational Therapy Goals  Initiated 11/4/2022    Pt stated goal \"to go home\"  Pt will be IND sup <> sit in prep for EOB ADLs  Pt will be MI grooming standing sink side LRAD  Pt will be IND UB dressing sitting EOB/long sit   Pt will be MI LE dressing sitting EOB/long sit  Pt will be MI sit <>  prep for toileting LRAD  Pt will be IND toileting/toilet transfer/cloth mgmt LRAD  Pt will be IND following UE HEP in prep for self care tasks      Outcome: Progressing Towards Goal

## 2022-11-09 NOTE — PROGRESS NOTES
Potassium infusion continues. Patient tolerating at a slower infusion due to mild discomfort, occasional beeping due to occlusion at IV insertion site in the [facility], and delay related to additional IV medication infusions. Patient on 2nd bag of four.

## 2022-11-09 NOTE — PROGRESS NOTES
Hematology/Oncology   Progress Note    Patient: Kb Gayle MRN: [id number]     YOB: 1955  Age: 78 y.o. Sex: female      Admit Date: 11/3/2022    LOS: 6 days     Chief Complaint: Admitted with generalized weakness    Subjective:     Feels better today. Denies dysphagia. Constitutional No fevers, chills, night sweats, excessive fatigue or weight loss. Allergic/Immunologic No recent allergic reactions   Eyes No significant visual difficulties. No diplopia. ENMT No problems with hearing, no sore throat, no sinus drainage. Endocrine No hot flashes or night sweats. No cold intolerance, polyuria, or polydipsia   Hematologic/Lymphatic No easy bruising or bleeding. The patient denies any tender or palpable lymph nodes   Breasts No abnormal masses of breast, nipple discharge or pain. Respiratory No dyspnea on exertion, orthopnea, chest pain, cough or hemoptysis. Cardiovascular No anginal chest pain, irregular heart beat, tachycardia, palpitations or orthopnea. Gastrointestinal No nausea, vomiting, diarrhea, constipation, cramping, dysphagia, reflux, heartburn, GI bleeding, or early satiety. No change in bowel habits. Genitourinary (M) No hematuria, dysuria, increased frequency, urgency, hesitancy or incontinence. Musculoskeletal No joint pain, swelling or redness. No decreased range of motion. Integumentary No chronic rashes, inflammation, ulcerations, pruritus, petechiae, purpura, ecchymoses, or skin changes. Neurologic No headache, blurred vision, and no areas of focal weakness or numbness. Normal gait. No sensory problems. Psychiatric No insomnia, depression, zainab or mood swings. No psychotropic drugs.         Current Facility-Administered Medications:     potassium chloride 10 mEq in 100 ml IVPB, 10 mEq, IntraVENous, Q1H, Jc Elizabeth MD, Last Rate: 100 mL/hr at 11/09/22 1553, 10 mEq at 11/09/22 1553    pantoprazole (PROTONIX) granules for oral suspension 40 mg, 40 mg, Per NG tube, DAILY, Jordyn Glasgow MD, 40 mg at 11/09/22 1102    fentaNYL citrate (PF) injection, , , PRN, Michele Schlatter, MD, 25 mcg at 11/07/22 1137    midazolam (PF) (VERSED) 1 mg/mL injection, , , PRN, Michele Schlatter, MD, 1 mg at 11/07/22 1135    magnesium gluconate tablet 27 mg [elemental], 1 Tablet, Oral, TID, Jordyn Glasgow MD, 27 mg at 11/09/22 1059    famotidine (PF) (PEPCID) 20 mg in 0.9% sodium chloride 10 mL injection, 20 mg, IntraVENous, Q12H PRN, [place], Shary Mcburney, MD    prochlorperazine (COMPAZINE) injection 10 mg, 10 mg, IntraVENous, Q6H PRN, Jcarlos Seeds F, NP    sodium chloride (NS) flush 5-40 mL, 5-40 mL, IntraVENous, Q8H, Dari Bernal MD, 10 mL at 11/09/22 1442    sodium chloride (NS) flush 5-40 mL, 5-40 mL, IntraVENous, PRN, Jordyn Glasgow MD    acetaminophen (TYLENOL) tablet 650 mg, 650 mg, Oral, Q6H PRN, 650 mg at 11/08/22 1150 **OR** acetaminophen (TYLENOL) suppository 650 mg, 650 mg, Rectal, Q6H PRN, Dari Greco MD    polyethylene glycol (MIRALAX) packet 17 g, 17 g, Oral, DAILY PRN, Dari Greco MD    ondansetron (ZOFRAN ODT) tablet 4 mg, 4 mg, Oral, Q8H PRN **OR** ondansetron (ZOFRAN) injection 4 mg, 4 mg, IntraVENous, Q6H PRN, Jordyn Glasgow MD, 4 mg at 11/03/22 2338    amLODIPine (NORVASC) tablet 5 mg, 5 mg, Oral, DAILY, Jordyn Glasgow MD, 5 mg at 11/09/22 1059    atorvastatin (LIPITOR) tablet 20 mg, 20 mg, Oral, DAILY, Jordyn Glasgow MD, 20 mg at 11/09/22 1059    lisinopriL (PRINIVIL, ZESTRIL) tablet 10 mg, 10 mg, Oral, DAILY, Jordyn Glasgow MD     Objective:     Vitals:    11/08/22 2009 11/09/22 0736 11/09/22 1241 11/09/22 1438   BP: (!) 162/87 117/72  131/76   Pulse: 76 76  72   Resp: 18 18  18   Temp: 98.2 °F (36.8 °C) 98.3 °F (36.8 °C)  98.7 °F (37.1 °C)   SpO2: 100% 99%  100%   Weight:       Height:   5' 5.98\" (1.676 m)           Physical Exam:   Constitutional Alert, cooperative, oriented. Mood and affect appropriate. Appears close to chronological age.  Well nourished. Well developed. Head Normocephalic; no scars   Eyes Conjunctivae and sclerae are clear and without icterus. Pupils are reactive and equal.   ENMT Sinuses are nontender. No oral exudates, ulcers, masses, thrush or mucositis. Oropharynx clear. Tongue normal.   Neck Supple without masses or thyromegaly. No jugular venous distension. Hematologic/Lymphatic No petechiae or purpura. No tender or palpable lymph nodes in the cervical, supraclavicular, axillary or inguinal area. Respiratory Lungs are clear to auscultation without rhonchi or wheezing. Cardiovascular Regular rate and rhythm of heart without murmurs, gallops or rubs. Chest / Line Site Chest is symmetric with no chest wall deformities. Abdomen Non-tender, non-distended, no masses, ascites or hepatosplenomegaly. Good bowel sounds. No guarding or rebound tenderness. No pulsatile masses. Musculoskeletal No tenderness or swelling, normal range of motion without obvious weakness. Extremities No visible deformities, no cyanosis, clubbing or edema. Skin No rashes, scars, or lesions suggestive of malignancy. No petechiae, purpura, or ecchymoses. No excoriations. Neurologic No sensory or motor deficits, normal cerebellar function, normal gait, cranial nerves intact. Psychiatric Alert and oriented times three. Coherent speech. Verbalizes understanding of our discussions today. Lab/Data Review:  Recent Labs     11/09/22  1136 11/08/22  0834 11/07/22  0828   WBC 2.6* 3.3* 3.7   HGB 9.7* 9.5* 9.1*   HCT 28.1* 28.2* 26.7*   PLT 69* 71* 78*     Recent Labs     11/09/22  1136 11/08/22  0834 11/07/22  0828    145 148*   K 3.0* 2.9* 3.0*    111* 113*   CO2 27 27 27   GLU 86 98 96   BUN 4* 3* 4*   CREA 0.44* 0.48* 0.47*   CA 8.2* 8.1* 7.9*   MG 1.7  1.7 1.5* 1.6   PHOS 2.9 3.0 2.3*   ALB 2.6*  --   --      No results for input(s): PH, PCO2, PO2, HCO3, FIO2 in the last 72 hours.   Recent Results (from the past 24 hour(s))   CBC WITH AUTOMATED DIFF    Collection Time: 11/09/22 11:36 AM   Result Value Ref Range    WBC 2.6 (L) 3.6 - 11.0 K/uL    RBC 2.87 (L) 3.80 - 5.20 M/uL    HGB 9.7 (L) 11.5 - 16.0 g/dL    HCT 28.1 (L) 35.0 - 47.0 %    MCV 97.9 80.0 - 99.0 FL    MCH 33.8 26.0 - 34.0 PG    MCHC 34.5 30.0 - 36.5 g/dL    RDW 19.8 (H) 11.5 - 14.5 %    PLATELET 69 (L) 238 - 400 K/uL    MPV 11.5 8.9 - 12.9 FL    NRBC 0.0 0.0  WBC    ABSOLUTE NRBC 0.00 0.00 - 0.01 K/uL    NEUTROPHILS 62 32 - 75 %    LYMPHOCYTES 21 12 - 49 %    MONOCYTES 16 (H) 5 - 13 %    EOSINOPHILS 0 0 - 7 %    BASOPHILS 0 0 - 1 %    IMMATURE GRANULOCYTES 1 (H) 0 - 0.5 %    ABS. NEUTROPHILS 1.6 (L) 1.8 - 8.0 K/UL    ABS. LYMPHOCYTES 0.6 (L) 0.8 - 3.5 K/UL    ABS. MONOCYTES 0.4 0.0 - 1.0 K/UL    ABS. EOSINOPHILS 0.0 0.0 - 0.4 K/UL    ABS. BASOPHILS 0.0 0.0 - 0.1 K/UL    ABS. IMM. GRANS. 0.0 0.00 - 0.04 K/UL    DF AUTOMATED     MAGNESIUM    Collection Time: 11/09/22 11:36 AM   Result Value Ref Range    Magnesium 1.7 1.6 - 2.4 mg/dL   RENAL FUNCTION PANEL    Collection Time: 11/09/22 11:36 AM   Result Value Ref Range    Sodium 141 136 - 145 mmol/L    Potassium 3.0 (L) 3.5 - 5.1 mmol/L    Chloride 108 97 - 108 mmol/L    CO2 27 21 - 32 mmol/L    Anion gap 6 5 - 15 mmol/L    Glucose 86 65 - 100 mg/dL    BUN 4 (L) 6 - 20 mg/dL    Creatinine 0.44 (L) 0.55 - 1.02 mg/dL    BUN/Creatinine ratio 9 (L) 12 - 20      eGFR >60 >60 ml/min/1.73m2    Calcium 8.2 (L) 8.5 - 10.1 mg/dL    Phosphorus 2.9 2.6 - 4.7 mg/dL    Albumin 2.6 (L) 3.5 - 5.0 g/dL   MAGNESIUM    Collection Time: 11/09/22 11:36 AM   Result Value Ref Range    Magnesium 1.7 1.6 - 2.4 mg/dL        Radiology:   CT Results  (Last 48 hours)      None             Assessment and Plan:      Active Problems:    Hypokalemia (61/0/2035)      Metabolic acidosis (95/6/2321)       Esophageal cancer:  -Completed chemo RT in 9/2022 with good response  -EGD done on 11/7/2022 did not mention esophageal mass  -Status post dilatation  -Follow-up with Dr. Carleen Sanchez after discharge.     Pancytopenia:  -Hemoglobin of 9.7, white count 2.6, platelet of 69.  -Most likely secondary to recent treatment as well as nutritional  -Continue supportive care  -No bleeding symptoms    Electrolyte abnormalities:  -Replacement per primary team.      Signed By: Nathen Rodas MD     November 9, 2022

## 2022-11-09 NOTE — PROGRESS NOTES
*ATTENTION:  This note has been created by a medical student for educational purposes only. Please do not refer to the content of this note for clinical decision-making, billing, or other purposes. Please see attending physicians note to obtain clinical information on this patient. *              Progress Note    Patient: Shira Carr MRN: [id number]  SSN: [id number]    YOB: 1955  Age: 78 y.o. Sex: female      Admit Date: 11/3/2022    LOS: 6 days     Subjective:     Saw the patient at bedside. She states she is doing well overall, no complaints at this time. She continues to have no difficulty swallowing the foods she has been given. The patient's potassium is 3.0 today. Objective:     Vitals:    11/08/22 2009 11/09/22 0736 11/09/22 1241 11/09/22 1438   BP: (!) 162/87 117/72  131/76   Pulse: 76 76  72   Resp: 18 18  18   Temp: 98.2 °F (36.8 °C) 98.3 °F (36.8 °C)  98.7 °F (37.1 °C)   SpO2: 100% 99%  100%   Weight:       Height:   5' 5.98\" (1.676 m)         Intake and Output:  Current Shift: No intake/output data recorded. Last three shifts: 11/07 1901 - 11/09 0700  In: 750 [P.O.:650]  Out: 500 [Urine:500]    Physical Exam:   Skin:  Extremities and face reveal no rashes. No beavers erythema. HEENT: Sclerae anicteric. Extra-occular muscles are intact. No abnormal pigmentation of the lips. The neck is supple. Cardiovascular: Regular rate and rhythm. Respiratory:  Comfortable breathing with no accessory muscle use. GI:  Abdomen nondistended, soft, and nontender. No enlargement of the liver or spleen. No masses palpable. Rectal:  Deferred  Musculoskeletal: Extremities have good range of motion. Neurological:  Gross memory appears intact. Patient is alert and oriented. Psychiatric:  Mood appears appropriate with judgement intact.   Lymphatic:  No visible adenopathy      Lab/Data Review:  Recent Results (from the past 24 hour(s))   CBC WITH AUTOMATED DIFF    Collection Time: 11/09/22 11:36 AM Result Value Ref Range    WBC 2.6 (L) 3.6 - 11.0 K/uL    RBC 2.87 (L) 3.80 - 5.20 M/uL    HGB 9.7 (L) 11.5 - 16.0 g/dL    HCT 28.1 (L) 35.0 - 47.0 %    MCV 97.9 80.0 - 99.0 FL    MCH 33.8 26.0 - 34.0 PG    MCHC 34.5 30.0 - 36.5 g/dL    RDW 19.8 (H) 11.5 - 14.5 %    PLATELET 69 (L) 742 - 400 K/uL    MPV 11.5 8.9 - 12.9 FL    NRBC 0.0 0.0  WBC    ABSOLUTE NRBC 0.00 0.00 - 0.01 K/uL    NEUTROPHILS 62 32 - 75 %    LYMPHOCYTES 21 12 - 49 %    MONOCYTES 16 (H) 5 - 13 %    EOSINOPHILS 0 0 - 7 %    BASOPHILS 0 0 - 1 %    IMMATURE GRANULOCYTES 1 (H) 0 - 0.5 %    ABS. NEUTROPHILS 1.6 (L) 1.8 - 8.0 K/UL    ABS. LYMPHOCYTES 0.6 (L) 0.8 - 3.5 K/UL    ABS. MONOCYTES 0.4 0.0 - 1.0 K/UL    ABS. EOSINOPHILS 0.0 0.0 - 0.4 K/UL    ABS. BASOPHILS 0.0 0.0 - 0.1 K/UL    ABS. IMM. GRANS. 0.0 0.00 - 0.04 K/UL    DF AUTOMATED     MAGNESIUM    Collection Time: 11/09/22 11:36 AM   Result Value Ref Range    Magnesium 1.7 1.6 - 2.4 mg/dL   RENAL FUNCTION PANEL    Collection Time: 11/09/22 11:36 AM   Result Value Ref Range    Sodium 141 136 - 145 mmol/L    Potassium 3.0 (L) 3.5 - 5.1 mmol/L    Chloride 108 97 - 108 mmol/L    CO2 27 21 - 32 mmol/L    Anion gap 6 5 - 15 mmol/L    Glucose 86 65 - 100 mg/dL    BUN 4 (L) 6 - 20 mg/dL    Creatinine 0.44 (L) 0.55 - 1.02 mg/dL    BUN/Creatinine ratio 9 (L) 12 - 20      eGFR >60 >60 ml/min/1.73m2    Calcium 8.2 (L) 8.5 - 10.1 mg/dL    Phosphorus 2.9 2.6 - 4.7 mg/dL    Albumin 2.6 (L) 3.5 - 5.0 g/dL   MAGNESIUM    Collection Time: 11/09/22 11:36 AM   Result Value Ref Range    Magnesium 1.7 1.6 - 2.4 mg/dL              XR CHEST PORT   Final Result   No Acute Disease. Assessment:     Active Problems:    Hypokalemia (68/9/1850)      Metabolic acidosis (94/7/8000)      Plan:     EGD done 2 days ago with dilatation of the esophagus. Dysphagia seems improved based on patient report. Plan to have the patient follow-up in clinic in 4 weeks.   Continue Protonix  Oncology plans to see her as outpatient  Will continue monitoring labs including potassium level. Replete potassium as needed. Will continue to follow peripherally while the patient is here. Thank you for allowing me to participate in this patients care    Signed By: Rosalie Royal     November 9, 2022    Patient seen and examined agree with impression and plan.   Agustin Richardson MD

## 2022-11-09 NOTE — PROGRESS NOTES
Hospitalist Progress Note    Daily Progress Note: 11/9/2022 2:10 PM    Assessment/Plan     78 AA Female with Esophageal Cancer and has completed XRT + Chemo with:     # Critical Life-threatening Hypokalemia  # SEBAS with Metabolic Acidosis; resolved  # Hx HTN  # N/V --> S/p EGD 11/7/22: Esophageal Stricture/Dilated; Gastritis  # Pancytopenia     => S/p IVF with NaHCO3  => Repeat labs; cont replacing K+, Mg++ - still low despite multiple repletions and scheduled dosing  => PPI daily  => Renal/Damodar following  => Hold ACE-I  => Reviewed home meds in [facility]  => Free water 100cc 3x daily via PEG     Disposition Status: Continue Inpatient Mgmt; SNF   DVT Prophylaxis: Lovenox  GI Prophylaxis: Protonix  Code Status: FULL  POA: Self/Daughter  Advanced Directive Discussion: N/A  -----------------------------------------------------    Admission Hx/HPI:  Skyla Bernstein is a 78 y.o. female who follows with Dr. Adriana Koenig for Esophageal CA and is s/p XRT and Chemo over past 2 months with increasing fatigue, n/v. Outpatient w/up showed low potassium for which she was prescribed replacement and she took. However, d/t persistent weakness, she came to ER. She is accompanied by her daughter at bedside. Patient lives by herself. + Sob, cough. No definite fever. In ER, K+ was 2.0 with CO2 of 12. Replacement as started. ----------------------------------------    Subjective:  No new issue; eating without problems. Denies recurrent n/v, sob, CP. Discussed with Dr. Elie Dean    Per Nutrition:    Advance diet when medically appropriate to Puree   Consult SLP for diet texture/consistency   When able Restart Feeding via Peg (per home regimen) of Two Alok 237 ml   Bolus 4 x day   4.   Flush tube with 180 ml water q 4 hours  This will provide 1896 kcal (100%), 79 g pro (85%), 1744 ml (97%)  Monitor diet advancement, initiation of enteral feeding, I/O's, weight     Per Physical Therapist: SNF      Review of Systems    Constitutional: No fevers, No chills, No night sweats, + fatigue/weakness, No significant weight change  Eyes: No visual disturbance, No loss of vision  HENT: No nasal congestion, No sore throat, No head lesion, No hearing deficit  Respiratory: No cough, No sputum, No wheezing, No SOB, No hemoptysis, No pleuritic CP  Cardiovascular: No chest pain, No lower extremity edema, No palpitations, No PND, No orthopnea, No JIANG  Gastrointestinal: resolved nausea/vomiting, No diarrhea, No constipation, No abdominal pain, No Melena, No hematemesis, No BRBPR.  PEG  Genitourinary: No frequency, No dysuria, No hematuria, No discharge  Integument: No rash, No new skin lesion(s), No dryness, No new palpable nodule  Musculoskeletal: No arthralgias, No neck pain, No back pain, No joint pain, No fall or injury  Neurological: No headaches, No dizziness, No confusion,  No seizures, No focal weakness, No LOC, No paresthesia  Heme/Onc: No overt bleeding noted, No obvious swollen glands  Behavioral/Psychiatric: No change in mood; no SI; no hallucination      Current Facility-Administered Medications   Medication Dose Route Frequency    potassium chloride 10 mEq in 100 ml IVPB  10 mEq IntraVENous Q1H    magnesium sulfate 1 g/100 ml IVPB (premix or compounded)  1 g IntraVENous ONCE    pantoprazole (PROTONIX) granules for oral suspension 40 mg  40 mg Per NG tube DAILY    fentaNYL citrate (PF) injection    PRN    midazolam (PF) (VERSED) 1 mg/mL injection    PRN    potassium phosphate, monobasic (K-PHOS) tablet 500 mg  1 Tablet Oral TID    magnesium gluconate tablet 27 mg [elemental]  1 Tablet Oral TID    famotidine (PF) (PEPCID) 20 mg in 0.9% sodium chloride 10 mL injection  20 mg IntraVENous Q12H PRN    prochlorperazine (COMPAZINE) injection 10 mg  10 mg IntraVENous Q6H PRN    sodium chloride (NS) flush 5-40 mL  5-40 mL IntraVENous Q8H    sodium chloride (NS) flush 5-40 mL  5-40 mL IntraVENous PRN    acetaminophen (TYLENOL) tablet 650 mg  650 mg Oral Q6H PRN    Or acetaminophen (TYLENOL) suppository 650 mg  650 mg Rectal Q6H PRN    polyethylene glycol (MIRALAX) packet 17 g  17 g Oral DAILY PRN    ondansetron (ZOFRAN ODT) tablet 4 mg  4 mg Oral Q8H PRN    Or    ondansetron (ZOFRAN) injection 4 mg  4 mg IntraVENous Q6H PRN    amLODIPine (NORVASC) tablet 5 mg  5 mg Oral DAILY    atorvastatin (LIPITOR) tablet 20 mg  20 mg Oral DAILY    [Held by provider] lisinopriL (PRINIVIL, ZESTRIL) tablet 10 mg  10 mg Oral DAILY       Objective     Visit Vitals  /72   Pulse 76   Temp 98.3 °F (36.8 °C)   Resp 18   Ht 5' 5.98\" (1.676 m)   Wt 71.8 kg (158 lb 4.8 oz)   SpO2 99%   Breastfeeding No   BMI 25.56 kg/m²      O2 Device: None (Room air)    Temp (24hrs), Av °F (36.7 °C), Min:97.5 °F (36.4 °C), Max:98.3 °F (36.8 °C)      No intake/output data recorded.  1901 -  0700  In: 750 [P.O.:650]  Out: 500 [Urine:500]      PHYSICAL EXAM    General: Awake and responsive, NAD; sitting in chair  Neck: Supple, radiation scarring and inflammatory changes  Vascular: No carotid bruit, palpable pulses bilat  Lung: CTA bilat, vesicular breath sounds  Heart: S1S2, No significant murmur appreciated; Sinus on Tele  Abdomen: Soft, NT, No rigidity, No rebound, Bowel sounds +; PEG in place  Extremities: No edema  M/S: No traumatic change, active mobility noted  Skin: Keloid scar upper chest  Neurologic: No overt focal motor deficit. AxOx3.    Psychiatric: Coherent and age appropriate affect; good spiritis      Data Review    Recent Results (from the past 24 hour(s))   CBC WITH AUTOMATED DIFF    Collection Time: 22 11:36 AM   Result Value Ref Range    WBC 2.6 (L) 3.6 - 11.0 K/uL    RBC 2.87 (L) 3.80 - 5.20 M/uL    HGB 9.7 (L) 11.5 - 16.0 g/dL    HCT 28.1 (L) 35.0 - 47.0 %    MCV 97.9 80.0 - 99.0 FL    MCH 33.8 26.0 - 34.0 PG    MCHC 34.5 30.0 - 36.5 g/dL    RDW 19.8 (H) 11.5 - 14.5 %    PLATELET 69 (L) 735 - 400 K/uL    MPV 11.5 8.9 - 12.9 FL    NRBC 0.0 0.0  WBC    ABSOLUTE NRBC 0.00 0.00 - 0.01 K/uL    NEUTROPHILS 62 32 - 75 %    LYMPHOCYTES 21 12 - 49 %    MONOCYTES 16 (H) 5 - 13 %    EOSINOPHILS 0 0 - 7 %    BASOPHILS 0 0 - 1 %    IMMATURE GRANULOCYTES 1 (H) 0 - 0.5 %    ABS. NEUTROPHILS 1.6 (L) 1.8 - 8.0 K/UL    ABS. LYMPHOCYTES 0.6 (L) 0.8 - 3.5 K/UL    ABS. MONOCYTES 0.4 0.0 - 1.0 K/UL    ABS. EOSINOPHILS 0.0 0.0 - 0.4 K/UL    ABS. BASOPHILS 0.0 0.0 - 0.1 K/UL    ABS. IMM.  GRANS. 0.0 0.00 - 0.04 K/UL    DF AUTOMATED     MAGNESIUM    Collection Time: 11/09/22 11:36 AM   Result Value Ref Range    Magnesium 1.7 1.6 - 2.4 mg/dL   RENAL FUNCTION PANEL    Collection Time: 11/09/22 11:36 AM   Result Value Ref Range    Sodium 141 136 - 145 mmol/L    Potassium 3.0 (L) 3.5 - 5.1 mmol/L    Chloride 108 97 - 108 mmol/L    CO2 27 21 - 32 mmol/L    Anion gap 6 5 - 15 mmol/L    Glucose 86 65 - 100 mg/dL    BUN 4 (L) 6 - 20 mg/dL    Creatinine 0.44 (L) 0.55 - 1.02 mg/dL    BUN/Creatinine ratio 9 (L) 12 - 20      eGFR >60 >60 ml/min/1.73m2    Calcium 8.2 (L) 8.5 - 10.1 mg/dL    Phosphorus 2.9 2.6 - 4.7 mg/dL    Albumin 2.6 (L) 3.5 - 5.0 g/dL       XR CHEST PORT   Final Result   No Acute Disease.              ________________________________________  Maude Crisostomo MD

## 2022-11-09 NOTE — PROGRESS NOTES
Patient DCP is home alone. Patient states she has a good support system. [facility] services at discharge but patient declined.

## 2022-11-09 NOTE — PROGRESS NOTES
Nutrition Assessment     Type and Reason for Visit: Reassess (interim)    Nutrition Recommendations/Plan:   Continue current diet per SLP  Tfs via Peg (per home regimen) of Two Alok 237mL 4 x day H2O flushes 180mL q4hrs- provides 1896 kcal (100%), 79 g pro (85%), 1744 ml (97%)  Monitor and record PO intake, TF rate, flushes, and Bms in I/Os     Nutrition Assessment:  Pt admitted with hypokalemia and metabolic acidosis. Pt with increasing fatigue over 2 mo, n/v and weakness. Currently Npo. She does have a peg tube in place for most nutritional needs @ home s/t esophageal CA. Takes some foods, but limited. Reports weight loss but weight noted as stable since FT placement in September. (11/9) EGD 11/7 showed esophageal stricture/stenosis. Able to eat lunch yesterday. Labs: Na 145, K 2.9, BUN 3, Creat 0.48, Gluc 98, Alb 2.7. Meds: atorvastatin, magnesium gluconate, pantoprazole, KCl, potassium phosphate    Malnutrition Assessment:  Malnutrition Status: Moderate malnutrition     Estimated Daily Nutrient Needs:  Energy (kcal):  1321-0758 kcal (25-28 kcal/kg)  Protein (g):   g (1.3-1.4 g/kg, CA)       Fluid (ml/day):  ~1800 ml    Nutrition Related Findings:  NFPE findings for Moderate malnutrition. No n/v/c/d. +PEG. Requires puree foods s/t esophageal ca, no coughing reported with liquids. No Bm recorded. No edema. + taste changes. Current Nutrition Therapies:  ADULT DIET Dysphagia - Soft & Bite Sized; 4 carb choices (60 gm/meal)    Anthropometric Measures:  Height:  5' 5.98\" (167.6 cm)  Current Body Wt:  71.8 kg (158 lb 4.6 oz)  BMI: 25.6    Nutrition Diagnosis:    In context of acute illness or injury, Moderate malnutrition related to catabolic illness, other (specify) (esophageal cancer) as evidenced by Criteria as identified in malnutrition assessment, weight loss 7.5% in 3 months    Nutrition Interventions:   Food and/or Nutrient Delivery: Continue current diet, Start tube feeding  Nutrition Education/Counseling: No recommendations at this time  Coordination of Nutrition Care: Continue to monitor while inpatient, Feeding assistance/environmental change, Speech therapy  Plan of Care discussed with: Pt    Goals:  Previous Goal Met: Progressing toward goal(s)  Goals: Initiate nutrition support, Initiate PO diet, by next RD assessment    Nutrition Monitoring and Evaluation:   Behavioral-Environmental Outcomes: None identified  Food/Nutrient Intake Outcomes: Diet advancement/tolerance, Enteral nutrition intake/tolerance  Physical Signs/Symptoms Outcomes: Chewing or swallowing, Meal time behavior, Weight    Discharge Planning:    Enteral nutrition    Joanna Salas RD  Contact: [phone]

## 2022-11-10 LAB
ALBUMIN SERPL-MCNC: 2.5 G/DL (ref 3.5–5)
ANION GAP SERPL CALC-SCNC: 6 MMOL/L (ref 5–15)
BASOPHILS # BLD: 0 K/UL (ref 0–0.1)
BASOPHILS NFR BLD: 0 % (ref 0–1)
BUN SERPL-MCNC: 3 MG/DL (ref 6–20)
BUN/CREAT SERPL: 7 (ref 12–20)
CA-I BLD-MCNC: 8.2 MG/DL (ref 8.5–10.1)
CHLORIDE SERPL-SCNC: 108 MMOL/L (ref 97–108)
CO2 SERPL-SCNC: 26 MMOL/L (ref 21–32)
CREAT SERPL-MCNC: 0.41 MG/DL (ref 0.55–1.02)
DIFFERENTIAL METHOD BLD: ABNORMAL
EOSINOPHIL # BLD: 0 K/UL (ref 0–0.4)
EOSINOPHIL NFR BLD: 0 % (ref 0–7)
ERYTHROCYTE [DISTWIDTH] IN BLOOD BY AUTOMATED COUNT: 19.4 % (ref 11.5–14.5)
GLUCOSE SERPL-MCNC: 88 MG/DL (ref 65–100)
HCT VFR BLD AUTO: 28 % (ref 35–47)
HGB BLD-MCNC: 9.5 G/DL (ref 11.5–16)
IMM GRANULOCYTES # BLD AUTO: 0 K/UL (ref 0–0.04)
IMM GRANULOCYTES NFR BLD AUTO: 1 % (ref 0–0.5)
LYMPHOCYTES # BLD: 0.7 K/UL (ref 0.8–3.5)
LYMPHOCYTES NFR BLD: 26 % (ref 12–49)
MCH RBC QN AUTO: 33.7 PG (ref 26–34)
MCHC RBC AUTO-ENTMCNC: 33.9 G/DL (ref 30–36.5)
MCV RBC AUTO: 99.3 FL (ref 80–99)
MONOCYTES # BLD: 0.4 K/UL (ref 0–1)
MONOCYTES NFR BLD: 15 % (ref 5–13)
NEUTS SEG # BLD: 1.4 K/UL (ref 1.8–8)
NEUTS SEG NFR BLD: 58 % (ref 32–75)
NRBC # BLD: 0 K/UL (ref 0–0.01)
NRBC BLD-RTO: 0 PER 100 WBC
PHOSPHATE SERPL-MCNC: 2.7 MG/DL (ref 2.6–4.7)
PLATELET # BLD AUTO: 66 K/UL (ref 150–400)
PMV BLD AUTO: 12.4 FL (ref 8.9–12.9)
POTASSIUM SERPL-SCNC: 3.3 MMOL/L (ref 3.5–5.1)
RBC # BLD AUTO: 2.82 M/UL (ref 3.8–5.2)
SODIUM SERPL-SCNC: 140 MMOL/L (ref 136–145)
WBC # BLD AUTO: 2.5 K/UL (ref 3.6–11)

## 2022-11-10 PROCEDURE — 80069 RENAL FUNCTION PANEL: CPT

## 2022-11-10 PROCEDURE — 65270000029 HC RM PRIVATE

## 2022-11-10 PROCEDURE — 36415 COLL VENOUS BLD VENIPUNCTURE: CPT

## 2022-11-10 PROCEDURE — 85025 COMPLETE CBC W/AUTO DIFF WBC: CPT

## 2022-11-10 PROCEDURE — 74011250637 HC RX REV CODE- 250/637: Performed by: INTERNAL MEDICINE

## 2022-11-10 PROCEDURE — 74011000250 HC RX REV CODE- 250: Performed by: INTERNAL MEDICINE

## 2022-11-10 RX ORDER — POTASSIUM CHLORIDE 750 MG/1
40 TABLET, FILM COATED, EXTENDED RELEASE ORAL
Status: COMPLETED | OUTPATIENT
Start: 2022-11-10 | End: 2022-11-10

## 2022-11-10 RX ORDER — POTASSIUM CHLORIDE 750 MG/1
40 TABLET, FILM COATED, EXTENDED RELEASE ORAL DAILY
Status: DISCONTINUED | OUTPATIENT
Start: 2022-11-11 | End: 2022-11-11 | Stop reason: RX

## 2022-11-10 RX ADMIN — Medication 27 MG: at 22:16

## 2022-11-10 RX ADMIN — PANTOPRAZOLE SODIUM 40 MG: 40 GRANULE, DELAYED RELEASE ORAL at 09:06

## 2022-11-10 RX ADMIN — Medication 27 MG: at 17:08

## 2022-11-10 RX ADMIN — POTASSIUM CHLORIDE 40 MEQ: 750 TABLET, FILM COATED, EXTENDED RELEASE ORAL at 13:51

## 2022-11-10 RX ADMIN — SODIUM CHLORIDE, PRESERVATIVE FREE 10 ML: 5 INJECTION INTRAVENOUS at 05:00

## 2022-11-10 RX ADMIN — ATORVASTATIN CALCIUM 20 MG: 20 TABLET, FILM COATED ORAL at 09:08

## 2022-11-10 RX ADMIN — Medication 27 MG: at 09:14

## 2022-11-10 RX ADMIN — SODIUM CHLORIDE, PRESERVATIVE FREE 10 ML: 5 INJECTION INTRAVENOUS at 14:09

## 2022-11-10 RX ADMIN — LISINOPRIL 10 MG: 10 TABLET ORAL at 09:06

## 2022-11-10 RX ADMIN — AMLODIPINE BESYLATE 5 MG: 5 TABLET ORAL at 09:08

## 2022-11-10 RX ADMIN — SODIUM CHLORIDE, PRESERVATIVE FREE 10 ML: 5 INJECTION INTRAVENOUS at 22:16

## 2022-11-10 RX ADMIN — POTASSIUM CHLORIDE 40 MEQ: 750 TABLET, FILM COATED, EXTENDED RELEASE ORAL at 17:08

## 2022-11-10 NOTE — PROGRESS NOTES
Bedside and Verbal shift change report given to Antonino Gomez (oncoming nurse) by Debora Patterson (offgoing nurse). Report included the following information SBAR, Kardex, ED Summary, OR Summary, Procedure Summary, Intake/Output, MAR, Recent Results, and Med Rec Status.

## 2022-11-10 NOTE — PROGRESS NOTES
Hospitalist Progress Note    Daily Progress Note: 11/10/2022 2:10 PM    Assessment/Plan     78 AA Female with Esophageal Cancer and has completed XRT + Chemo with:     # Critical Life-threatening Hypokalemia; refractory  # SEBAS with Metabolic Acidosis; resolved  # Hx HTN  # N/V --> S/p EGD 11/7/22: Esophageal Stricture/Dilated; Gastritis  # Pancytopenia     => S/p IVF with NaHCO3  => Repeat labs; cont replacing K+, Mg++ - still low despite multiple repletions and scheduled dosing  => PPI daily  => Renal/Damodar following  => Hold ACE-I  => Free water 100cc 3x daily via PEG     Disposition Status: DC hopefully tomorrow/Friday if K+ remains stable  DVT Prophylaxis: Lovenox  GI Prophylaxis: Protonix  Code Status: FULL  POA: Self/Daughter  Advanced Directive Discussion: N/A  -----------------------------------------------------    Admission Hx/HPI:  Carlitos Soifa is a 78 y.o. female who follows with Dr. Melvin Butler for Esophageal CA and is s/p XRT and Chemo over past 2 months with increasing fatigue, n/v. Outpatient w/up showed low potassium for which she was prescribed replacement and she took. However, d/t persistent weakness, she came to ER. She is accompanied by her daughter at bedside. Patient lives by herself. + Sob, cough. No definite fever. In ER, K+ was 2.0 with CO2 of 12. Replacement as started. ----------------------------------------    Subjective:  No new issue; eating without problems. Denies recurrent n/v, sob, CP. Discussed with Dr. Jessica Clark    Per Nutrition:    Advance diet when medically appropriate to Puree   Consult SLP for diet texture/consistency   When able Restart Feeding via Peg (per home regimen) of Two Alok 237 ml   Bolus 4 x day   4.   Flush tube with 180 ml water q 4 hours  This will provide 1896 kcal (100%), 79 g pro (85%), 1744 ml (97%)  Monitor diet advancement, initiation of enteral feeding, I/O's, weight     Per Physical Therapist: SNF      Review of Systems    Constitutional: No fevers, No chills, No night sweats, + fatigue/weakness, No significant weight change  Eyes: No visual disturbance, No loss of vision  HENT: No nasal congestion, No sore throat, No head lesion, No hearing deficit  Respiratory: No cough, No sputum, No wheezing, No SOB, No hemoptysis, No pleuritic CP  Cardiovascular: No chest pain, No lower extremity edema, No palpitations, No PND, No orthopnea, No JIANG  Gastrointestinal: resolved nausea/vomiting, No diarrhea, No constipation, No abdominal pain, No Melena, No hematemesis, No BRBPR.  PEG  Genitourinary: No frequency, No dysuria, No hematuria, No discharge  Integument: No rash, No new skin lesion(s), No dryness, No new palpable nodule  Musculoskeletal: No arthralgias, No neck pain, No back pain, No joint pain, No fall or injury  Neurological: No headaches, No dizziness, No confusion,  No seizures, No focal weakness, No LOC, No paresthesia  Heme/Onc: No overt bleeding noted, No obvious swollen glands  Behavioral/Psychiatric: No change in mood; no SI; no hallucination      Current Facility-Administered Medications   Medication Dose Route Frequency    [START ON 11/11/2022] potassium chloride SR (KLOR-CON 10) tablet 40 mEq  40 mEq Oral DAILY    potassium chloride SR (KLOR-CON 10) tablet 40 mEq  40 mEq Oral NOW    pantoprazole (PROTONIX) granules for oral suspension 40 mg  40 mg Per NG tube DAILY    fentaNYL citrate (PF) injection    PRN    midazolam (PF) (VERSED) 1 mg/mL injection    PRN    magnesium gluconate tablet 27 mg [elemental]  1 Tablet Oral TID    famotidine (PF) (PEPCID) 20 mg in 0.9% sodium chloride 10 mL injection  20 mg IntraVENous Q12H PRN    prochlorperazine (COMPAZINE) injection 10 mg  10 mg IntraVENous Q6H PRN    sodium chloride (NS) flush 5-40 mL  5-40 mL IntraVENous Q8H    sodium chloride (NS) flush 5-40 mL  5-40 mL IntraVENous PRN    acetaminophen (TYLENOL) tablet 650 mg  650 mg Oral Q6H PRN    Or    acetaminophen (TYLENOL) suppository 650 mg  650 mg Rectal Q6H PRN polyethylene glycol (MIRALAX) packet 17 g  17 g Oral DAILY PRN    ondansetron (ZOFRAN ODT) tablet 4 mg  4 mg Oral Q8H PRN    Or    ondansetron (ZOFRAN) injection 4 mg  4 mg IntraVENous Q6H PRN    amLODIPine (NORVASC) tablet 5 mg  5 mg Oral DAILY    atorvastatin (LIPITOR) tablet 20 mg  20 mg Oral DAILY    lisinopriL (PRINIVIL, ZESTRIL) tablet 10 mg  10 mg Oral DAILY       Objective     Visit Vitals  BP (!) 146/85   Pulse 94   Temp 98 °F (36.7 °C)   Resp 18   Ht 5' 5.98\" (1.676 m)   Wt 71.8 kg (158 lb 4.8 oz)   SpO2 98%   Breastfeeding No   BMI 25.56 kg/m²      O2 Device: None (Room air)    Temp (24hrs), Av.3 °F (36.8 °C), Min:98 °F (36.7 °C), Max:98.7 °F (37.1 °C)      11/10 0701 - 11/10 1900  In: 120   Out: -   1901 - 11/10 0700  In: 100 [I.V.:100]  Out: 500 [Urine:500]      PHYSICAL EXAM    General: Awake and responsive, NAD; sitting in chair  Neck: Supple, radiation scarring and inflammatory changes  Vascular: No carotid bruit, palpable pulses bilat  Lung: CTA bilat, vesicular breath sounds  Heart: S1S2, No significant murmur appreciated; Sinus on Tele  Abdomen: Soft, NT, No rigidity, No rebound, Bowel sounds +; PEG in place  Extremities: No edema  M/S: No traumatic change, active mobility noted  Skin: Keloid scar upper chest  Neurologic: No overt focal motor deficit. AxOx3.    Psychiatric: Coherent and age appropriate affect; good spiritis      Data Review    Recent Results (from the past 24 hour(s))   CBC WITH AUTOMATED DIFF    Collection Time: 11/10/22 11:00 AM   Result Value Ref Range    WBC 2.5 (L) 3.6 - 11.0 K/uL    RBC 2.82 (L) 3.80 - 5.20 M/uL    HGB 9.5 (L) 11.5 - 16.0 g/dL    HCT 28.0 (L) 35.0 - 47.0 %    MCV 99.3 (H) 80.0 - 99.0 FL    MCH 33.7 26.0 - 34.0 PG    MCHC 33.9 30.0 - 36.5 g/dL    RDW 19.4 (H) 11.5 - 14.5 %    PLATELET 66 (L) 525 - 400 K/uL    MPV 12.4 8.9 - 12.9 FL    NRBC 0.0 0.0  WBC    ABSOLUTE NRBC 0.00 0.00 - 0.01 K/uL    NEUTROPHILS 58 32 - 75 %    LYMPHOCYTES 26 12 - 49 %    MONOCYTES 15 (H) 5 - 13 %    EOSINOPHILS 0 0 - 7 %    BASOPHILS 0 0 - 1 %    IMMATURE GRANULOCYTES 1 (H) 0 - 0.5 %    ABS. NEUTROPHILS 1.4 (L) 1.8 - 8.0 K/UL    ABS. LYMPHOCYTES 0.7 (L) 0.8 - 3.5 K/UL    ABS. MONOCYTES 0.4 0.0 - 1.0 K/UL    ABS. EOSINOPHILS 0.0 0.0 - 0.4 K/UL    ABS. BASOPHILS 0.0 0.0 - 0.1 K/UL    ABS. IMM.  GRANS. 0.0 0.00 - 0.04 K/UL    DF AUTOMATED     RENAL FUNCTION PANEL    Collection Time: 11/10/22 11:00 AM   Result Value Ref Range    Sodium 140 136 - 145 mmol/L    Potassium 3.3 (L) 3.5 - 5.1 mmol/L    Chloride 108 97 - 108 mmol/L    CO2 26 21 - 32 mmol/L    Anion gap 6 5 - 15 mmol/L    Glucose 88 65 - 100 mg/dL    BUN 3 (L) 6 - 20 mg/dL    Creatinine 0.41 (L) 0.55 - 1.02 mg/dL    BUN/Creatinine ratio 7 (L) 12 - 20      eGFR >60 >60 ml/min/1.73m2    Calcium 8.2 (L) 8.5 - 10.1 mg/dL    Phosphorus 2.7 2.6 - 4.7 mg/dL    Albumin 2.5 (L) 3.5 - 5.0 g/dL       XR CHEST PORT   Final Result   No Acute Disease.              ________________________________________  Maude Crisostomo MD

## 2022-11-10 NOTE — PROGRESS NOTES
Renal Consult Note    Admit Date: 11/3/2022      HPI:   Seen and examined follow-up\  She is eating and drinking well. No abdominal pain  Does not report cramps at this time.     No palpitations    Current Facility-Administered Medications   Medication Dose Route Frequency    [START ON 11/11/2022] potassium chloride SR (KLOR-CON 10) tablet 40 mEq  40 mEq Oral DAILY    pantoprazole (PROTONIX) granules for oral suspension 40 mg  40 mg Per NG tube DAILY    fentaNYL citrate (PF) injection    PRN    midazolam (PF) (VERSED) 1 mg/mL injection    PRN    magnesium gluconate tablet 27 mg [elemental]  1 Tablet Oral TID    famotidine (PF) (PEPCID) 20 mg in 0.9% sodium chloride 10 mL injection  20 mg IntraVENous Q12H PRN    prochlorperazine (COMPAZINE) injection 10 mg  10 mg IntraVENous Q6H PRN    sodium chloride (NS) flush 5-40 mL  5-40 mL IntraVENous Q8H    sodium chloride (NS) flush 5-40 mL  5-40 mL IntraVENous PRN    acetaminophen (TYLENOL) tablet 650 mg  650 mg Oral Q6H PRN    Or    acetaminophen (TYLENOL) suppository 650 mg  650 mg Rectal Q6H PRN    polyethylene glycol (MIRALAX) packet 17 g  17 g Oral DAILY PRN    ondansetron (ZOFRAN ODT) tablet 4 mg  4 mg Oral Q8H PRN    Or    ondansetron (ZOFRAN) injection 4 mg  4 mg IntraVENous Q6H PRN    amLODIPine (NORVASC) tablet 5 mg  5 mg Oral DAILY    atorvastatin (LIPITOR) tablet 20 mg  20 mg Oral DAILY    lisinopriL (PRINIVIL, ZESTRIL) tablet 10 mg  10 mg Oral DAILY        Past Medical History:   Diagnosis Date    Cancer ([facility] [address]       Past Surgical History:   Procedure Laterality Date    HX CATARACT REMOVAL      HX HYSTERECTOMY      HX OOPHORECTOMY      IR CHOLECYSTOSTOMY PERCUTANEOUS      IR INSERT TUNL CVC W PORT OVER 5 YEARS  7/20/2022     Family History   Problem Relation Age of Onset    Breast Cancer Mother       Social History     Tobacco Use    Smoking status: Never    Smokeless tobacco: Never   Substance Use Topics    Alcohol use: Not Currently         Review of Systems    Review of Systems   Constitutional:  Negative for fever. Respiratory:  Negative for cough and shortness of breath. Gastrointestinal:  Negative for abdominal pain. Neurological:  Negative for headaches. Physical Exam:     Physical Exam  Cardiovascular:      Rate and Rhythm: Regular rhythm. Pulmonary:      Breath sounds: Normal breath sounds. Abdominal:      General: Bowel sounds are normal.      Palpations: Abdomen is soft. Comments: PEG tube present   Musculoskeletal:         General: No swelling. Neurological:      Mental Status: She is alert. Hyperpigmentation her on the neck since radiation therapy. No edema. Next  Alert oriented x3  No neuropsych abnormalities    Patient Vitals for the past 8 hrs:   BP Temp Pulse Resp SpO2   11/10/22 0833 (!) 146/85 98 °F (36.7 °C) 94 18 98 %       11/10 0701 - 11/10 1900  In: 6583 [P.O.:860]  Out: -   11/08 1901 - 11/10 0700  In: 100 [I.V.:100]  Out: 500 [Urine:500]          XR CHEST PORT   Final Result   No Acute Disease. Data Review   Recent Labs     11/10/22  1100 11/09/22  1136 11/08/22  0834   WBC 2.5* 2.6* 3.3*   HGB 9.5* 9.7* 9.5*   HCT 28.0* 28.1* 28.2*   PLT 66* 69* 71*       Recent Labs     11/10/22  1100 11/09/22  1136 11/08/22  0834    141 145   K 3.3* 3.0* 2.9*    108 111*   CO2 26 27 27   GLU 88 86 98   BUN 3* 4* 3*   CREA 0.41* 0.44* 0.48*   CA 8.2* 8.2* 8.1*   MG  --  1.7  1.7 1.5*   PHOS 2.7 2.9 3.0   ALB 2.5* 2.6*  --        No components found for: GLPOC  No results for input(s): PH, PCO2, PO2, HCO3, FIO2 in the last 72 hours. No results for input(s): INR, INREXT, INREXT, INREXT in the last 72 hours. Assessment:           Active Problems:    Hypokalemia (03/3/8109)      Metabolic acidosis (78/3/5733)  Hypokalemia, moderate     Hypertension, essential, under fairly good control.     Anemia    Thrombocytopenia- Plt 70    Esophageal cancer s/p radiation and chemotherapy    Plan:   K is 3.3 at this time. Give oral KCL 40 now. Probably would need to discharge her on liquid KCl 20 mEq twice a day. Would need a BMP check in 5 days of discharge.   Thank you for this consult

## 2022-11-10 NOTE — PROGRESS NOTES
Problem: Falls - Risk of  Goal: *Absence of Falls  Description: Document Georgina Daly Fall Risk and appropriate interventions in the flowsheet.   Outcome: Progressing Towards Goal  Note: Fall Risk Interventions:  Mobility Interventions: Bed/chair exit alarm         Medication Interventions: Assess postural VS orthostatic hypotension, Evaluate medications/consider consulting pharmacy, Teach patient to arise slowly    Elimination Interventions: Call light in reach

## 2022-11-10 NOTE — PROGRESS NOTES
0805: Chart reviewed. Potassium being repleted per MD notes. Current Dispo: Home with Family. Patient has declined HH.     CM will continue to follow patient and recs of medical team.

## 2022-11-10 NOTE — PROGRESS NOTES
*ATTENTION:  This note has been created by a medical student for educational purposes only. Please do not refer to the content of this note for clinical decision-making, billing, or other purposes. Please see attending physicians note to obtain clinical information on this patient. *              Progress Note    Patient: Nell Orellana MRN: [id number]  SSN: [id number]    YOB: 1955  Age: 78 y.o. Sex: female      Admit Date: 11/3/2022    LOS: 7 days     Subjective:     Saw the patient at bedside. She states she is doing well overall, no complaints at this time. She continues to have no difficulty swallowing the foods she has been given. The patient's potassium is 3.3 today. Objective:     Vitals:    11/09/22 2126 11/10/22 0459 11/10/22 0833 11/10/22 1553   BP: 128/74  (!) 146/85 134/73   Pulse: 75 82 94 73   Resp: 18  18 19   Temp: 98.2 °F (36.8 °C)  98 °F (36.7 °C) 98.9 °F (37.2 °C)   SpO2: 100%  98% 100%   Weight:       Height:            Intake and Output:  Current Shift: 11/10 0701 - 11/10 1900  In: 1060 [P.O.:860]  Out: -   Last three shifts: 11/08 1901 - 11/10 0700  In: 100 [I.V.:100]  Out: 500 [Urine:500]    Physical Exam:   Skin:  Extremities and face reveal no rashes. No beavers erythema. HEENT: Sclerae anicteric. Extra-occular muscles are intact. No abnormal pigmentation of the lips. The neck is supple. Cardiovascular: Regular rate and rhythm. Respiratory:  Comfortable breathing with no accessory muscle use. GI:  Abdomen nondistended, soft, and nontender. No enlargement of the liver or spleen. No masses palpable. Rectal:  Deferred  Musculoskeletal: Extremities have good range of motion. Neurological:  Gross memory appears intact. Patient is alert and oriented. Psychiatric:  Mood appears appropriate with judgement intact.   Lymphatic:  No visible adenopathy      Lab/Data Review:  Recent Results (from the past 24 hour(s))   CBC WITH AUTOMATED DIFF    Collection Time: 11/10/22 11:00 AM   Result Value Ref Range    WBC 2.5 (L) 3.6 - 11.0 K/uL    RBC 2.82 (L) 3.80 - 5.20 M/uL    HGB 9.5 (L) 11.5 - 16.0 g/dL    HCT 28.0 (L) 35.0 - 47.0 %    MCV 99.3 (H) 80.0 - 99.0 FL    MCH 33.7 26.0 - 34.0 PG    MCHC 33.9 30.0 - 36.5 g/dL    RDW 19.4 (H) 11.5 - 14.5 %    PLATELET 66 (L) 189 - 400 K/uL    MPV 12.4 8.9 - 12.9 FL    NRBC 0.0 0.0  WBC    ABSOLUTE NRBC 0.00 0.00 - 0.01 K/uL    NEUTROPHILS 58 32 - 75 %    LYMPHOCYTES 26 12 - 49 %    MONOCYTES 15 (H) 5 - 13 %    EOSINOPHILS 0 0 - 7 %    BASOPHILS 0 0 - 1 %    IMMATURE GRANULOCYTES 1 (H) 0 - 0.5 %    ABS. NEUTROPHILS 1.4 (L) 1.8 - 8.0 K/UL    ABS. LYMPHOCYTES 0.7 (L) 0.8 - 3.5 K/UL    ABS. MONOCYTES 0.4 0.0 - 1.0 K/UL    ABS. EOSINOPHILS 0.0 0.0 - 0.4 K/UL    ABS. BASOPHILS 0.0 0.0 - 0.1 K/UL    ABS. IMM. GRANS. 0.0 0.00 - 0.04 K/UL    DF AUTOMATED     RENAL FUNCTION PANEL    Collection Time: 11/10/22 11:00 AM   Result Value Ref Range    Sodium 140 136 - 145 mmol/L    Potassium 3.3 (L) 3.5 - 5.1 mmol/L    Chloride 108 97 - 108 mmol/L    CO2 26 21 - 32 mmol/L    Anion gap 6 5 - 15 mmol/L    Glucose 88 65 - 100 mg/dL    BUN 3 (L) 6 - 20 mg/dL    Creatinine 0.41 (L) 0.55 - 1.02 mg/dL    BUN/Creatinine ratio 7 (L) 12 - 20      eGFR >60 >60 ml/min/1.73m2    Calcium 8.2 (L) 8.5 - 10.1 mg/dL    Phosphorus 2.7 2.6 - 4.7 mg/dL    Albumin 2.5 (L) 3.5 - 5.0 g/dL              XR CHEST PORT   Final Result   No Acute Disease. Assessment:     Active Problems:    Hypokalemia (60/7/6115)      Metabolic acidosis (87/2/3287)      Plan:     EGD done 2 days ago with dilatation of the esophagus. Dysphagia seems improved based on patient report. Plan to have the patient follow-up in clinic in 4 weeks. Continue Protonix  Oncology plans to see her as outpatient  Will continue monitoring labs including potassium level. Replete potassium as needed. Will continue to follow peripherally while the patient is here.     Thank you for allowing me to participate in this patients care    Signed By: Bill Pereyra     November 10, 2022    Patient seen and examined agree with impression and plan.   Liliana Duenas MD

## 2022-11-11 VITALS
HEIGHT: 66 IN | DIASTOLIC BLOOD PRESSURE: 82 MMHG | TEMPERATURE: 97.7 F | WEIGHT: 158.3 LBS | RESPIRATION RATE: 19 BRPM | OXYGEN SATURATION: 100 % | BODY MASS INDEX: 25.44 KG/M2 | SYSTOLIC BLOOD PRESSURE: 119 MMHG | HEART RATE: 89 BPM

## 2022-11-11 LAB
ANION GAP SERPL CALC-SCNC: 7 MMOL/L (ref 5–15)
BASOPHILS # BLD: 0 K/UL (ref 0–0.1)
BASOPHILS NFR BLD: 0 % (ref 0–1)
BUN SERPL-MCNC: 2 MG/DL (ref 6–20)
BUN/CREAT SERPL: 5 (ref 12–20)
CA-I BLD-MCNC: 8.5 MG/DL (ref 8.5–10.1)
CHLORIDE SERPL-SCNC: 106 MMOL/L (ref 97–108)
CO2 SERPL-SCNC: 26 MMOL/L (ref 21–32)
CREAT SERPL-MCNC: 0.42 MG/DL (ref 0.55–1.02)
DIFFERENTIAL METHOD BLD: ABNORMAL
EOSINOPHIL # BLD: 0 K/UL (ref 0–0.4)
EOSINOPHIL NFR BLD: 1 % (ref 0–7)
ERYTHROCYTE [DISTWIDTH] IN BLOOD BY AUTOMATED COUNT: 18.9 % (ref 11.5–14.5)
GLUCOSE SERPL-MCNC: 87 MG/DL (ref 65–100)
HCT VFR BLD AUTO: 28.5 % (ref 35–47)
HGB BLD-MCNC: 9.6 G/DL (ref 11.5–16)
IMM GRANULOCYTES # BLD AUTO: 0 K/UL (ref 0–0.04)
IMM GRANULOCYTES NFR BLD AUTO: 1 % (ref 0–0.5)
LYMPHOCYTES # BLD: 0.8 K/UL (ref 0.8–3.5)
LYMPHOCYTES NFR BLD: 28 % (ref 12–49)
MCH RBC QN AUTO: 33.7 PG (ref 26–34)
MCHC RBC AUTO-ENTMCNC: 33.7 G/DL (ref 30–36.5)
MCV RBC AUTO: 100 FL (ref 80–99)
MONOCYTES # BLD: 0.4 K/UL (ref 0–1)
MONOCYTES NFR BLD: 15 % (ref 5–13)
NEUTS SEG # BLD: 1.5 K/UL (ref 1.8–8)
NEUTS SEG NFR BLD: 55 % (ref 32–75)
NRBC # BLD: 0 K/UL (ref 0–0.01)
NRBC BLD-RTO: 0 PER 100 WBC
PLATELET # BLD AUTO: 71 K/UL (ref 150–400)
PMV BLD AUTO: 11.7 FL (ref 8.9–12.9)
POTASSIUM SERPL-SCNC: 3.4 MMOL/L (ref 3.5–5.1)
RBC # BLD AUTO: 2.85 M/UL (ref 3.8–5.2)
SODIUM SERPL-SCNC: 139 MMOL/L (ref 136–145)
WBC # BLD AUTO: 2.8 K/UL (ref 3.6–11)

## 2022-11-11 PROCEDURE — 74011000250 HC RX REV CODE- 250: Performed by: INTERNAL MEDICINE

## 2022-11-11 PROCEDURE — 80048 BASIC METABOLIC PNL TOTAL CA: CPT

## 2022-11-11 PROCEDURE — 74011250637 HC RX REV CODE- 250/637: Performed by: INTERNAL MEDICINE

## 2022-11-11 PROCEDURE — 85025 COMPLETE CBC W/AUTO DIFF WBC: CPT

## 2022-11-11 PROCEDURE — 36415 COLL VENOUS BLD VENIPUNCTURE: CPT

## 2022-11-11 RX ORDER — POTASSIUM CHLORIDE 20 MEQ/1
40 TABLET, EXTENDED RELEASE ORAL DAILY
Status: DISCONTINUED | OUTPATIENT
Start: 2022-11-11 | End: 2022-11-11

## 2022-11-11 RX ORDER — PANTOPRAZOLE SODIUM 40 MG/1
40 GRANULE, DELAYED RELEASE ORAL DAILY
Qty: 30 EACH | Refills: 0 | Status: SHIPPED | OUTPATIENT
Start: 2022-11-12

## 2022-11-11 RX ORDER — POTASSIUM CHLORIDE 1.5 G/1.77G
20 POWDER, FOR SOLUTION ORAL 2 TIMES DAILY WITH MEALS
Status: DISCONTINUED | OUTPATIENT
Start: 2022-11-11 | End: 2022-11-11 | Stop reason: HOSPADM

## 2022-11-11 RX ORDER — POTASSIUM CHLORIDE 1.5 G/1.77G
20 POWDER, FOR SOLUTION ORAL 2 TIMES DAILY WITH MEALS
Qty: 14 PACKET | Refills: 1 | Status: SHIPPED | OUTPATIENT
Start: 2022-11-11

## 2022-11-11 RX ORDER — UREA 10 %
1 LOTION (ML) TOPICAL 3 TIMES DAILY
Qty: 45 TABLET | Refills: 0 | Status: SHIPPED | OUTPATIENT
Start: 2022-11-11

## 2022-11-11 RX ORDER — POTASSIUM CHLORIDE 750 MG/1
40 TABLET, FILM COATED, EXTENDED RELEASE ORAL
Status: DISCONTINUED | OUTPATIENT
Start: 2022-11-11 | End: 2022-11-11 | Stop reason: HOSPADM

## 2022-11-11 RX ADMIN — ATORVASTATIN CALCIUM 20 MG: 20 TABLET, FILM COATED ORAL at 09:51

## 2022-11-11 RX ADMIN — AMLODIPINE BESYLATE 5 MG: 5 TABLET ORAL at 09:49

## 2022-11-11 RX ADMIN — POTASSIUM CHLORIDE 40 MEQ: 1500 TABLET, EXTENDED RELEASE ORAL at 11:06

## 2022-11-11 RX ADMIN — PANTOPRAZOLE SODIUM 40 MG: 40 GRANULE, DELAYED RELEASE ORAL at 09:51

## 2022-11-11 RX ADMIN — SODIUM CHLORIDE, PRESERVATIVE FREE 10 ML: 5 INJECTION INTRAVENOUS at 06:07

## 2022-11-11 RX ADMIN — LISINOPRIL 10 MG: 10 TABLET ORAL at 09:51

## 2022-11-11 RX ADMIN — Medication 27 MG: at 09:56

## 2022-11-11 NOTE — PROGRESS NOTES
Patient DCP is home alone but does report a good support system. Patient has declined [facility] and SNF services. Medicare pt has received, reviewed, and signed 2nd IM letter informing them of their right to appeal the discharge. Signed copied has been placed on pt bedside chart. Discharge plan of care/case management plan validated with provider discharge order.

## 2022-11-11 NOTE — PROGRESS NOTES
Hematology/Oncology   Progress Note    Patient: Letty Wooten MRN: [id number]     YOB: 1955  Age: 78 y.o. Sex: female      Admit Date: 11/3/2022    LOS: 7 days     Chief Complaint: Admitted with generalized weakness    Subjective:     Feels better today. Denies dysphagia. Constitutional No fevers, chills, night sweats, excessive fatigue or weight loss. Allergic/Immunologic No recent allergic reactions   Eyes No significant visual difficulties. No diplopia. ENMT No problems with hearing, no sore throat, no sinus drainage. Endocrine No hot flashes or night sweats. No cold intolerance, polyuria, or polydipsia   Hematologic/Lymphatic No easy bruising or bleeding. The patient denies any tender or palpable lymph nodes   Breasts No abnormal masses of breast, nipple discharge or pain. Respiratory No dyspnea on exertion, orthopnea, chest pain, cough or hemoptysis. Cardiovascular No anginal chest pain, irregular heart beat, tachycardia, palpitations or orthopnea. Gastrointestinal No nausea, vomiting, diarrhea, constipation, cramping, dysphagia, reflux, heartburn, GI bleeding, or early satiety. No change in bowel habits. Genitourinary (M) No hematuria, dysuria, increased frequency, urgency, hesitancy or incontinence. Musculoskeletal No joint pain, swelling or redness. No decreased range of motion. Integumentary No chronic rashes, inflammation, ulcerations, pruritus, petechiae, purpura, ecchymoses, or skin changes. Neurologic No headache, blurred vision, and no areas of focal weakness or numbness. Normal gait. No sensory problems. Psychiatric No insomnia, depression, zaniab or mood swings. No psychotropic drugs.         Current Facility-Administered Medications:     [START ON 11/11/2022] potassium chloride SR (KLOR-CON 10) tablet 40 mEq, 40 mEq, Oral, DAILY, Chadd Bernal MD    pantoprazole (PROTONIX) granules for oral suspension 40 mg, 40 mg, Per NG tube, DAILY, Chadd Bernal, MD, 40 mg at 11/10/22 0906    fentaNYL citrate (PF) injection, , , PRN, Ras Pink MD, 25 mcg at 11/07/22 1137    midazolam (PF) (VERSED) 1 mg/mL injection, , , PRN, Ras Pink MD, 1 mg at 11/07/22 1135    magnesium gluconate tablet 27 mg [elemental], 1 Tablet, Oral, TID, Sunshine Olguin MD, 27 mg at 11/10/22 1708    famotidine (PF) (PEPCID) 20 mg in 0.9% sodium chloride 10 mL injection, 20 mg, IntraVENous, Q12H PRN, Joce Michelle MD    prochlorperazine (COMPAZINE) injection 10 mg, 10 mg, IntraVENous, Q6H PRN, Nery Vitale NP    sodium chloride (NS) flush 5-40 mL, 5-40 mL, IntraVENous, Q8H, Heidy Bernal MD, 10 mL at 11/10/22 1409    sodium chloride (NS) flush 5-40 mL, 5-40 mL, IntraVENous, PRN, Sunshine Olguin MD    acetaminophen (TYLENOL) tablet 650 mg, 650 mg, Oral, Q6H PRN, 650 mg at 11/08/22 1150 **OR** acetaminophen (TYLENOL) suppository 650 mg, 650 mg, Rectal, Q6H PRN, Sunshine Olguin MD    polyethylene glycol (MIRALAX) packet 17 g, 17 g, Oral, DAILY PRN, Heidy Doyle MD    ondansetron (ZOFRAN ODT) tablet 4 mg, 4 mg, Oral, Q8H PRN **OR** ondansetron (ZOFRAN) injection 4 mg, 4 mg, IntraVENous, Q6H PRN, Sunshine Olguin MD, 4 mg at 11/03/22 2338    amLODIPine (NORVASC) tablet 5 mg, 5 mg, Oral, DAILY, Sunshine Olguin MD, 5 mg at 11/10/22 0908    atorvastatin (LIPITOR) tablet 20 mg, 20 mg, Oral, DAILY, Sunshine Olguin MD, 20 mg at 11/10/22 0908    lisinopriL (PRINIVIL, ZESTRIL) tablet 10 mg, 10 mg, Oral, DAILY, Sunshine Olguin MD, 10 mg at 11/10/22 0906     Objective:     Vitals:    11/09/22 2126 11/10/22 0459 11/10/22 0833 11/10/22 1553   BP: 128/74  (!) 146/85 134/73   Pulse: 75 82 94 73   Resp: 18  18 19   Temp: 98.2 °F (36.8 °C)  98 °F (36.7 °C) 98.9 °F (37.2 °C)   SpO2: 100%  98% 100%   Weight:       Height:              Physical Exam:   Constitutional Alert, cooperative, oriented. Mood and affect appropriate. Appears close to chronological age. Well nourished. Well developed.    Head Normocephalic; no scars   Eyes Conjunctivae and sclerae are clear and without icterus. Pupils are reactive and equal.   ENMT Sinuses are nontender. No oral exudates, ulcers, masses, thrush or mucositis. Oropharynx clear. Tongue normal.   Neck Supple without masses or thyromegaly. No jugular venous distension. Hematologic/Lymphatic No petechiae or purpura. No tender or palpable lymph nodes in the cervical, supraclavicular, axillary or inguinal area. Respiratory Lungs are clear to auscultation without rhonchi or wheezing. Cardiovascular Regular rate and rhythm of heart without murmurs, gallops or rubs. Chest / Line Site Chest is symmetric with no chest wall deformities. Abdomen Non-tender, non-distended, no masses, ascites or hepatosplenomegaly. Good bowel sounds. No guarding or rebound tenderness. No pulsatile masses. Musculoskeletal No tenderness or swelling, normal range of motion without obvious weakness. Extremities No visible deformities, no cyanosis, clubbing or edema. Skin No rashes, scars, or lesions suggestive of malignancy. No petechiae, purpura, or ecchymoses. No excoriations. Neurologic No sensory or motor deficits, normal cerebellar function, normal gait, cranial nerves intact. Psychiatric Alert and oriented times three. Coherent speech. Verbalizes understanding of our discussions today. Lab/Data Review:  Recent Labs     11/10/22  1100 11/09/22  1136 11/08/22  0834   WBC 2.5* 2.6* 3.3*   HGB 9.5* 9.7* 9.5*   HCT 28.0* 28.1* 28.2*   PLT 66* 69* 71*     Recent Labs     11/10/22  1100 11/09/22  1136 11/08/22  0834    141 145   K 3.3* 3.0* 2.9*    108 111*   CO2 26 27 27   GLU 88 86 98   BUN 3* 4* 3*   CREA 0.41* 0.44* 0.48*   CA 8.2* 8.2* 8.1*   MG  --  1.7  1.7 1.5*   PHOS 2.7 2.9 3.0   ALB 2.5* 2.6*  --      No results for input(s): PH, PCO2, PO2, HCO3, FIO2 in the last 72 hours.   Recent Results (from the past 24 hour(s))   CBC WITH AUTOMATED DIFF    Collection Time: 11/10/22 11:00 AM   Result Value Ref Range    WBC 2.5 (L) 3.6 - 11.0 K/uL    RBC 2.82 (L) 3.80 - 5.20 M/uL    HGB 9.5 (L) 11.5 - 16.0 g/dL    HCT 28.0 (L) 35.0 - 47.0 %    MCV 99.3 (H) 80.0 - 99.0 FL    MCH 33.7 26.0 - 34.0 PG    MCHC 33.9 30.0 - 36.5 g/dL    RDW 19.4 (H) 11.5 - 14.5 %    PLATELET 66 (L) 142 - 400 K/uL    MPV 12.4 8.9 - 12.9 FL    NRBC 0.0 0.0  WBC    ABSOLUTE NRBC 0.00 0.00 - 0.01 K/uL    NEUTROPHILS 58 32 - 75 %    LYMPHOCYTES 26 12 - 49 %    MONOCYTES 15 (H) 5 - 13 %    EOSINOPHILS 0 0 - 7 %    BASOPHILS 0 0 - 1 %    IMMATURE GRANULOCYTES 1 (H) 0 - 0.5 %    ABS. NEUTROPHILS 1.4 (L) 1.8 - 8.0 K/UL    ABS. LYMPHOCYTES 0.7 (L) 0.8 - 3.5 K/UL    ABS. MONOCYTES 0.4 0.0 - 1.0 K/UL    ABS. EOSINOPHILS 0.0 0.0 - 0.4 K/UL    ABS. BASOPHILS 0.0 0.0 - 0.1 K/UL    ABS. IMM. GRANS. 0.0 0.00 - 0.04 K/UL    DF AUTOMATED     RENAL FUNCTION PANEL    Collection Time: 11/10/22 11:00 AM   Result Value Ref Range    Sodium 140 136 - 145 mmol/L    Potassium 3.3 (L) 3.5 - 5.1 mmol/L    Chloride 108 97 - 108 mmol/L    CO2 26 21 - 32 mmol/L    Anion gap 6 5 - 15 mmol/L    Glucose 88 65 - 100 mg/dL    BUN 3 (L) 6 - 20 mg/dL    Creatinine 0.41 (L) 0.55 - 1.02 mg/dL    BUN/Creatinine ratio 7 (L) 12 - 20      eGFR >60 >60 ml/min/1.73m2    Calcium 8.2 (L) 8.5 - 10.1 mg/dL    Phosphorus 2.7 2.6 - 4.7 mg/dL    Albumin 2.5 (L) 3.5 - 5.0 g/dL        Radiology:   CT Results  (Last 48 hours)      None             Assessment and Plan: Active Problems:    Hypokalemia (31/5/2472)      Metabolic acidosis (84/5/7620)     Esophageal cancer:  -Completed chemo RT in 9/2022 with good response  -EGD done on 11/7/2022 did not mention esophageal mass  -Status post dilatation  -Follow-up with Dr. Murvin Bosworth after discharge.     Pancytopenia:  -Hemoglobin of 9.5, white count 2.5, platelet of 66.  -Most likely secondary to recent treatment as well as nutritional  -Continue supportive care  -No bleeding symptoms    Electrolyte abnormalities:  -Replacement per primary team.      Signed By: Ирина Burrows MD     November 10, 2022

## 2022-11-11 NOTE — PROGRESS NOTES
VSS. Patient given discharge instructions. Medications and follow-up appointments reviewed. IV and Telemetry removed. Case management, provider, and primary nurse aware of discharge. Discharge plan of care/case management plan validated with provider discharge order. Patient waiting for daughter.

## 2022-11-11 NOTE — PROGRESS NOTES
Bedside shift change report given to Delisa Montenegro (oncoming nurse) by Waleska Alegria RN (offgoing nurse). Report included the following information SBAR, Kardex, Intake/Output, and Cardiac Rhythm   .

## 2022-11-11 NOTE — PROGRESS NOTES
Bedside and Verbal shift change report given to Brynn Kingston (oncoming nurse) by Jose Lincoln (offgoing nurse). Report included the following information SBAR, Kardex, ED Summary, Procedure Summary, Intake/Output, MAR, Recent Results, and Med Rec Status.

## 2022-11-11 NOTE — DISCHARGE SUMMARY
Hospitalist Discharge Summary     Patient ID:    Ivette Pavon  [id number]  78 y.o.  1955    Admit date: 11/3/2022    Discharge date : 11/11/2022      Final Diagnoses/Acute Mgmt:    78 AA Female with Esophageal Cancer and has completed XRT + Chemo with:     # S/p Refractory, Critical Life-threatening Hypokalemia, Hypomagnesemia; resolved  # SEBAS with Metabolic Acidosis; resolved  # Hx HTN; stable  # N/V --> S/p EGD 11/7/22: Esophageal Stricture/Dilated; Gastritis  # Pancytopenia; stable, related to chemo     => S/p IVF with NaHCO3, IV K+ and IV Mag  => Mg 1.7; K 3.4; replacement continued  => PPI daily  => Consultants: Renal/Damodar, GI/[place], Onc/Sukumaran  => Resume ACE-I; DC Norvasc  => Free water 100cc 3x daily via PEG     -----------------------------------------------------     Admission Hx/HPI:  Ivette Pavon is a 78 y.o. female who follows with Dr. Steph Wood for Esophageal CA and is s/p XRT and Chemo over past 2 months with increasing fatigue, n/v. Outpatient w/up showed low potassium for which she was prescribed replacement and she took. However, d/t persistent weakness, she came to ER. She is accompanied by her daughter at bedside. Patient lives by herself. + Sob, cough. No definite fever. In ER, K+ was 2.0 with CO2 of 12. Replacement as started. ----------------------------------------     Subjective:  No new issue; eating without problems. Denies recurrent n/v, sob, CP. Discussed with Dr. Jagdish Judd      Per Nutrition:     Advance diet when medically appropriate to Puree   Consult SLP for diet texture/consistency   When able Restart Feeding via Peg (per home regimen) of Two Alok 237 ml   Bolus 4 x day   4.   Flush tube with 180 ml water q 4 hours  This will provide 1896 kcal (100%), 79 g pro (85%), 1744 ml (97%)  Monitor diet advancement, initiation of enteral feeding, I/O's, weight      Per Physical Therapist: SNF      Reason for Hospitalization & Hospital Course:       Discharge Medications    Current Discharge Medication List        START taking these medications    Details   potassium chloride (KLOR-CON) 20 mEq pack 1 Packet by Per G Tube route two (2) times daily (with meals). Qty: 14 Packet, Refills: 1  Start date: 11/11/2022      magnesium gluconate 500 mg (27 mg elemental magnesium) tab tablet Take 1 Tablet by mouth three (3) times daily. Qty: 45 Tablet, Refills: 0  Start date: 11/11/2022      pantoprazole (PROTONIX) 40 mg granules for oral suspension 40 mg by Per NG tube route daily. Qty: 30 Each, Refills: 0  Start date: 11/12/2022           CONTINUE these medications which have NOT CHANGED    Details   Lidocaine Viscous 2 % solution       ondansetron (ZOFRAN ODT) 4 mg disintegrating tablet TAKE 1 TABLET BY MOUTH THREE TIMES A DAY AS NEEDED FOR NAUSEA      silver sulfADIAZINE (SILVADENE) 1 % topical cream APPLY TO AFFECTED AREA 3 TIMES A DAY      lisinopriL (PRINIVIL, ZESTRIL) 5 mg tablet Take 10 mg by mouth in the morning. atorvastatin (LIPITOR) 20 mg tablet Take  by mouth daily. STOP taking these medications       potassium chloride (KAON 20%) 40 mEq/15 mL liqd Comments:   Reason for Stopping:         amLODIPine (NORVASC) 5 mg tablet Comments:   Reason for Stopping: Follow up Care    1. Melodie Ho MD in <1 week    Follow-up Information       Follow up With Specialties Details Why Contact Info    Melodie Ho MD Internal Medicine Physician   [address] [facility]  [phone]                Patient Follow Up Instructions: Activity: Activity as tolerated  Diet:  Regular Diet    Condition at Discharge:  Stable    Disposition  Home or Self Care    Code Status:  Full Code    Discharge Exam:  Patient seen and examined by me on discharge day.     General: Awake and responsive, NAD; sitting in chair  Neck: Supple, radiation scarring and inflammatory changes  Vascular: No carotid bruit, palpable pulses bilat  Lung: CTA bilat, vesicular breath sounds  Heart: S1S2, No significant murmur appreciated; Sinus on Tele  Abdomen: Soft, NT, No rigidity, No rebound, Bowel sounds +; PEG in place  Extremities: No edema  M/S: No traumatic change, active mobility noted  Skin: Keloid scar upper chest  Neurologic: No overt focal motor deficit. AxOx3. Psychiatric: Coherent and age appropriate affect; good spiritis      CONSULTATIONS: GI, Nephrology, and Hematology/Oncology    Significant Diagnostic Studies:   Recent Results (from the past 24 hour(s))   CBC WITH AUTOMATED DIFF    Collection Time: 11/11/22  8:18 AM   Result Value Ref Range    WBC 2.8 (L) 3.6 - 11.0 K/uL    RBC 2.85 (L) 3.80 - 5.20 M/uL    HGB 9.6 (L) 11.5 - 16.0 g/dL    HCT 28.5 (L) 35.0 - 47.0 %    .0 (H) 80.0 - 99.0 FL    MCH 33.7 26.0 - 34.0 PG    MCHC 33.7 30.0 - 36.5 g/dL    RDW 18.9 (H) 11.5 - 14.5 %    PLATELET 71 (L) 859 - 400 K/uL    MPV 11.7 8.9 - 12.9 FL    NRBC 0.0 0.0  WBC    ABSOLUTE NRBC 0.00 0.00 - 0.01 K/uL    NEUTROPHILS 55 32 - 75 %    LYMPHOCYTES 28 12 - 49 %    MONOCYTES 15 (H) 5 - 13 %    EOSINOPHILS 1 0 - 7 %    BASOPHILS 0 0 - 1 %    IMMATURE GRANULOCYTES 1 (H) 0 - 0.5 %    ABS. NEUTROPHILS 1.5 (L) 1.8 - 8.0 K/UL    ABS. LYMPHOCYTES 0.8 0.8 - 3.5 K/UL    ABS. MONOCYTES 0.4 0.0 - 1.0 K/UL    ABS. EOSINOPHILS 0.0 0.0 - 0.4 K/UL    ABS. BASOPHILS 0.0 0.0 - 0.1 K/UL    ABS. IMM. GRANS. 0.0 0.00 - 0.04 K/UL    DF AUTOMATED     METABOLIC PANEL, BASIC    Collection Time: 11/11/22  8:18 AM   Result Value Ref Range    Sodium 139 136 - 145 mmol/L    Potassium 3.4 (L) 3.5 - 5.1 mmol/L    Chloride 106 97 - 108 mmol/L    CO2 26 21 - 32 mmol/L    Anion gap 7 5 - 15 mmol/L    Glucose 87 65 - 100 mg/dL    BUN 2 (L) 6 - 20 mg/dL    Creatinine 0.42 (L) 0.55 - 1.02 mg/dL    BUN/Creatinine ratio 5 (L) 12 - 20      eGFR >60 >60 ml/min/1.73m2    Calcium 8.5 8.5 - 10.1 mg/dL     XR CHEST PORT   Final Result   No Acute Disease.               Total DC Time and Coordination of Care: 40 mins    Signed:  Shahram Lewis MD  11/11/2022  11:21 AM

## 2022-11-11 NOTE — PROGRESS NOTES
Renal Consult Note    Admit Date: 11/3/2022      HPI:   Seen and examined in follow up  No new changes  No diarrhea  Wants to go home     Current Facility-Administered Medications   Medication Dose Route Frequency    potassium chloride SR (KLOR-CON 10) tablet 40 mEq  40 mEq Oral NOW    potassium chloride (KLOR-CON) packet for solution 20 mEq  20 mEq Per G Tube BID WITH MEALS    pantoprazole (PROTONIX) granules for oral suspension 40 mg  40 mg Per NG tube DAILY    fentaNYL citrate (PF) injection    PRN    midazolam (PF) (VERSED) 1 mg/mL injection    PRN    magnesium gluconate tablet 27 mg [elemental]  1 Tablet Oral TID    famotidine (PF) (PEPCID) 20 mg in 0.9% sodium chloride 10 mL injection  20 mg IntraVENous Q12H PRN    prochlorperazine (COMPAZINE) injection 10 mg  10 mg IntraVENous Q6H PRN    sodium chloride (NS) flush 5-40 mL  5-40 mL IntraVENous Q8H    sodium chloride (NS) flush 5-40 mL  5-40 mL IntraVENous PRN    acetaminophen (TYLENOL) tablet 650 mg  650 mg Oral Q6H PRN    Or    acetaminophen (TYLENOL) suppository 650 mg  650 mg Rectal Q6H PRN    polyethylene glycol (MIRALAX) packet 17 g  17 g Oral DAILY PRN    ondansetron (ZOFRAN ODT) tablet 4 mg  4 mg Oral Q8H PRN    Or    ondansetron (ZOFRAN) injection 4 mg  4 mg IntraVENous Q6H PRN    amLODIPine (NORVASC) tablet 5 mg  5 mg Oral DAILY    atorvastatin (LIPITOR) tablet 20 mg  20 mg Oral DAILY    lisinopriL (PRINIVIL, ZESTRIL) tablet 10 mg  10 mg Oral DAILY        Past Medical History:   Diagnosis Date    Cancer ([facility] [address]       Past Surgical History:   Procedure Laterality Date    HX CATARACT REMOVAL      HX HYSTERECTOMY      HX OOPHORECTOMY      IR CHOLECYSTOSTOMY PERCUTANEOUS      IR INSERT TUNL CVC W PORT OVER 5 YEARS  7/20/2022     Family History   Problem Relation Age of Onset    Breast Cancer Mother       Social History     Tobacco Use    Smoking status: Never    Smokeless tobacco: Never   Substance Use Topics    Alcohol use: Not Currently         Review of Systems    Review of Systems   Constitutional:  Negative for fever. Respiratory:  Negative for cough and shortness of breath. Gastrointestinal:  Negative for abdominal pain. Neurological:  Negative for headaches. Physical Exam:     Physical Exam  Cardiovascular:      Rate and Rhythm: Regular rhythm. Pulmonary:      Breath sounds: Normal breath sounds. Abdominal:      General: Bowel sounds are normal.      Palpations: Abdomen is soft. Comments: PEG tube present   Musculoskeletal:         General: No swelling. Neurological:      Mental Status: She is alert. Hyperpigmentation her on the neck since radiation therapy. No edema. Alert oriented x3  No neuropsych abnormalities    Patient Vitals for the past 8 hrs:   BP Temp Pulse Resp SpO2   11/11/22 0825 119/82 97.7 °F (36.5 °C) 89 19 100 %       No intake/output data recorded. 11/09 1901 - 11/11 0700  In: 0033 [P.O.:1100; I.V.:100]  Out: -           XR CHEST PORT   Final Result   No Acute Disease. Data Review   Recent Labs     11/11/22  0818 11/10/22  1100 11/09/22  1136   WBC 2.8* 2.5* 2.6*   HGB 9.6* 9.5* 9.7*   HCT 28.5* 28.0* 28.1*   PLT 71* 66* 69*       Recent Labs     11/11/22  0818 11/10/22  1100 11/09/22  1136    140 141   K 3.4* 3.3* 3.0*    108 108   CO2 26 26 27   GLU 87 88 86   BUN 2* 3* 4*   CREA 0.42* 0.41* 0.44*   CA 8.5 8.2* 8.2*   MG  --   --  1.7  1.7   PHOS  --  2.7 2.9   ALB  --  2.5* 2.6*       No components found for: GLPOC  No results for input(s): PH, PCO2, PO2, HCO3, FIO2 in the last 72 hours. No results for input(s): INR, INREXT, INREXT, INREXT in the last 72 hours. Assessment:           Active Problems:    Hypokalemia (52/3/3825)      Metabolic acidosis (44/0/7364)  Hypokalemia, moderate     Hypertension, essential, under fairly good control. Anemia    Thrombocytopenia- Plt 70    Esophageal cancer s/p radiation and chemotherapy    Plan:   Potassium is 3.4. Oral KCl 40 mEq today. Try to administer KCl packet that can be dissolved in water. Continue on KCl on discharge. Resume ACE inhibitor-lisinopril. High potassium diet encouraged. Would need a BMP check in 5 days of discharge.   Thank you for this consult

## 2022-11-11 NOTE — PROGRESS NOTES
Physician Progress Note      PATIENT:               Benita Padilla  CSN #:                  [id number]  :                       1955  ADMIT DATE:       11/3/2022 11:54 AM  DISCH DATE:  RESPONDING  PROVIDER #:        Gualberto Whitman MD          QUERY TEXT:    Dear Attending,    Pt admitted with esophageal stricture. Pt noted to have Esophageal cancer. If possible, please document in progress notes and discharge summary the relationship, if any, between esophageal stricture and Esophageal Cancer. The medical record reflects the following:  Risk Factors: 78year old female, weakness, nausea and vomiting, weight loss, Esophageal cancer s/p chemo and radiation  Clinical Indicators: 11/3 H&P - follows with Dr. Erika Callaway for Esophageal CA and is s/p XRT and Chemo over past 2 months with increasing fatigue, n/v.   GI consult - Nausea, vomiting, Esophageal Cancer   EGD - Esophageal stricture/stenosis  Through-the-scope Balloon dilation dilation of size  11mm. Pneumatic Dilation.  Hematology consult - Esophageal cancer:  -Completed chemo RT in 2022 with good response  -EGD done on 2022 did not mention esophageal mass  -Status post dilatation  Treatment: EGD with Dilatation, IV Pepcid, PO Protonix      Please email [email or web address] with any questions  Options provided:  -- esophageal stricture due to Esophageal cancer  -- esophageal stricture unrelated to Esophageal cancer  -- Other - I will add my own diagnosis  -- Disagree - Not applicable / Not valid  -- Disagree - Clinically unable to determine / Unknown  -- Refer to Clinical Documentation Reviewer    PROVIDER RESPONSE TEXT:    This patient has esophageal stricture due to Esophageal cancer. Query created by:  Bruno Jessica on 2022 9:26 AM      Electronically signed by:  Gualberto Whitman MD 2022 2:09 PM

## 2022-12-23 ENCOUNTER — TRANSCRIBE ORDER (OUTPATIENT)
Dept: SCHEDULING | Age: 67
End: 2022-12-23

## 2022-12-23 DIAGNOSIS — C15.9 ESOPHAGEAL CANCER (HCC): ICD-10-CM

## 2022-12-23 DIAGNOSIS — K80.20 BILIARY CALCULUS: Primary | ICD-10-CM

## 2022-12-28 ENCOUNTER — HOSPITAL ENCOUNTER (OUTPATIENT)
Dept: CT IMAGING | Age: 67
Discharge: HOME | End: 2022-12-28
Attending: INTERNAL MEDICINE
Payer: MEDICARE

## 2022-12-28 DIAGNOSIS — K80.20 BILIARY CALCULUS: ICD-10-CM

## 2022-12-28 DIAGNOSIS — C15.9 ESOPHAGEAL CANCER (HCC): ICD-10-CM

## 2022-12-28 LAB — CREAT BLD-MCNC: 0.6 MG/DL (ref 0.6–1.3)

## 2022-12-28 PROCEDURE — 82565 ASSAY OF CREATININE: CPT

## 2022-12-28 PROCEDURE — 74160 CT ABDOMEN W/CONTRAST: CPT

## 2022-12-28 PROCEDURE — 74011000636 HC RX REV CODE- 636: Performed by: INTERNAL MEDICINE

## 2022-12-28 RX ADMIN — IOPAMIDOL 100 ML: 755 INJECTION, SOLUTION INTRAVENOUS at 14:31

## 2023-02-28 ENCOUNTER — TRANSCRIBE ORDER (OUTPATIENT)
Dept: SCHEDULING | Age: 68
End: 2023-02-28

## 2023-02-28 DIAGNOSIS — C15.9 ESOPHAGEAL CANCER (HCC): ICD-10-CM

## 2023-02-28 DIAGNOSIS — R13.19 ESOPHAGEAL DYSPHAGIA: Primary | ICD-10-CM

## 2023-02-28 DIAGNOSIS — K80.20 BILIARY CALCULUS: Primary | ICD-10-CM

## 2023-02-28 DIAGNOSIS — Z43.1 ATTENTION TO G-TUBE (HCC): ICD-10-CM

## 2023-02-28 DIAGNOSIS — C15.9 PRIMARY SQUAMOUS CELL CARCINOMA OF ESOPHAGUS (HCC): ICD-10-CM

## 2023-03-07 ENCOUNTER — HOSPITAL ENCOUNTER (OUTPATIENT)
Dept: GENERAL RADIOLOGY | Age: 68
Discharge: HOME | End: 2023-03-07
Payer: MEDICARE

## 2023-03-07 DIAGNOSIS — R13.19 ESOPHAGEAL DYSPHAGIA: ICD-10-CM

## 2023-03-07 DIAGNOSIS — Z43.1 ATTENTION TO G-TUBE (HCC): ICD-10-CM

## 2023-03-07 DIAGNOSIS — C15.9 PRIMARY SQUAMOUS CELL CARCINOMA OF ESOPHAGUS (HCC): ICD-10-CM

## 2023-03-07 PROCEDURE — 74220 X-RAY XM ESOPHAGUS 1CNTRST: CPT

## 2023-03-07 PROCEDURE — 74011000250 HC RX REV CODE- 250: Performed by: NURSE PRACTITIONER

## 2023-03-07 RX ADMIN — ANTACID/ANTIFLATULENT 4 G: 380; 550; 10; 10 GRANULE, EFFERVESCENT ORAL at 09:48

## 2023-03-07 RX ADMIN — BARIUM SULFATE 135 ML: 980 POWDER, FOR SUSPENSION ORAL at 09:49

## 2023-03-07 RX ADMIN — BARIUM SULFATE 700 MG: 700 TABLET ORAL at 09:48

## 2023-03-07 RX ADMIN — BARIUM SULFATE 355 ML: 0.6 SUSPENSION ORAL at 09:49

## 2023-03-28 ENCOUNTER — HOSPITAL ENCOUNTER (OUTPATIENT)
Dept: CT IMAGING | Age: 68
Discharge: HOME | End: 2023-03-28
Attending: INTERNAL MEDICINE
Payer: MEDICARE

## 2023-03-28 DIAGNOSIS — K80.20 BILIARY CALCULUS: ICD-10-CM

## 2023-03-28 DIAGNOSIS — C15.9 ESOPHAGEAL CANCER (HCC): ICD-10-CM

## 2023-03-28 LAB — CREAT BLD-MCNC: 0.5 MG/DL (ref 0.6–1.3)

## 2023-03-28 PROCEDURE — 71260 CT THORAX DX C+: CPT

## 2023-03-28 PROCEDURE — 74011000636 HC RX REV CODE- 636: Performed by: INTERNAL MEDICINE

## 2023-03-28 PROCEDURE — 82565 ASSAY OF CREATININE: CPT

## 2023-03-28 PROCEDURE — 70491 CT SOFT TISSUE NECK W/DYE: CPT

## 2023-03-28 RX ADMIN — IOPAMIDOL 100 ML: 755 INJECTION, SOLUTION INTRAVENOUS at 11:14

## 2023-04-20 ENCOUNTER — HOSPITAL ENCOUNTER (OUTPATIENT)
Dept: RADIATION THERAPY | Age: 68
Discharge: HOME | End: 2023-04-20

## 2023-06-21 ENCOUNTER — HOSPITAL ENCOUNTER (OUTPATIENT)
Facility: HOSPITAL | Age: 68
Discharge: HOME | End: 2023-06-24
Attending: INTERNAL MEDICINE
Payer: MEDICARE

## 2023-06-21 DIAGNOSIS — Z12.31 ENCOUNTER FOR SCREENING MAMMOGRAM FOR MALIGNANT NEOPLASM OF BREAST: ICD-10-CM

## 2023-06-21 PROCEDURE — 77067 SCR MAMMO BI INCL CAD: CPT

## 2023-07-18 ENCOUNTER — HOSPITAL ENCOUNTER (OUTPATIENT)
Facility: HOSPITAL | Age: 68
Discharge: HOME | End: 2023-07-21
Attending: INTERNAL MEDICINE
Payer: MEDICARE

## 2023-07-18 DIAGNOSIS — K80.20 CALCULUS OF CYSTIC DUCT: ICD-10-CM

## 2023-07-18 DIAGNOSIS — C15.9 MALIGNANT NEOPLASM OF ESOPHAGUS, UNSPECIFIED LOCATION (HCC): ICD-10-CM

## 2023-07-18 LAB — CREAT BLD-MCNC: 0.6 MG/DL (ref 0.6–1.3)

## 2023-07-18 PROCEDURE — 71260 CT THORAX DX C+: CPT

## 2023-07-18 PROCEDURE — 6360000004 HC RX CONTRAST MEDICATION: Performed by: INTERNAL MEDICINE

## 2023-07-18 PROCEDURE — 82565 ASSAY OF CREATININE: CPT

## 2023-07-18 RX ADMIN — IOPAMIDOL 100 ML: 755 INJECTION, SOLUTION INTRAVENOUS at 13:42

## 2023-10-09 ENCOUNTER — HOSPITAL ENCOUNTER (OUTPATIENT)
Facility: HOSPITAL | Age: 68
Discharge: HOME | End: 2023-10-12
Attending: INTERNAL MEDICINE
Payer: MEDICARE

## 2023-10-09 DIAGNOSIS — C15.9 MALIGNANT NEOPLASM OF ESOPHAGUS, UNSPECIFIED LOCATION (HCC): ICD-10-CM

## 2023-10-09 DIAGNOSIS — K80.20 CALCULUS OF CYSTIC DUCT: ICD-10-CM

## 2023-10-09 PROCEDURE — 71260 CT THORAX DX C+: CPT

## 2023-10-09 PROCEDURE — 6360000004 HC RX CONTRAST MEDICATION: Performed by: INTERNAL MEDICINE

## 2023-10-09 RX ADMIN — IOPAMIDOL 100 ML: 755 INJECTION, SOLUTION INTRAVENOUS at 13:18

## 2023-10-27 ENCOUNTER — HOSPITAL ENCOUNTER (OUTPATIENT)
Facility: HOSPITAL | Age: 68
End: 2023-10-27

## 2023-10-27 VITALS
RESPIRATION RATE: 18 BRPM | OXYGEN SATURATION: 100 % | BODY MASS INDEX: 26 KG/M2 | TEMPERATURE: 97.4 F | HEART RATE: 65 BPM | SYSTOLIC BLOOD PRESSURE: 147 MMHG | WEIGHT: 161 LBS | DIASTOLIC BLOOD PRESSURE: 81 MMHG

## 2023-10-27 DIAGNOSIS — C15.3 CANCER OF CERVICAL ESOPHAGUS (HCC): Primary | ICD-10-CM

## 2023-10-27 ASSESSMENT — PAIN SCALES - GENERAL: PAINLEVEL_OUTOF10: 0

## 2024-01-17 ENCOUNTER — HOSPITAL ENCOUNTER (OUTPATIENT)
Facility: HOSPITAL | Age: 69
Discharge: HOME | End: 2024-01-20
Attending: INTERNAL MEDICINE
Payer: MEDICARE

## 2024-01-17 DIAGNOSIS — C15.9 MALIGNANT NEOPLASM OF ESOPHAGUS, UNSPECIFIED LOCATION (HCC): ICD-10-CM

## 2024-01-17 DIAGNOSIS — K80.20 CALCULUS OF CYSTIC DUCT: ICD-10-CM

## 2024-01-17 LAB — CREAT BLD-MCNC: 0.6 MG/DL (ref 0.6–1.3)

## 2024-01-17 PROCEDURE — 6360000004 HC RX CONTRAST MEDICATION: Performed by: INTERNAL MEDICINE

## 2024-01-17 PROCEDURE — 82565 ASSAY OF CREATININE: CPT

## 2024-01-17 PROCEDURE — 74177 CT ABD & PELVIS W/CONTRAST: CPT

## 2024-01-17 PROCEDURE — 70491 CT SOFT TISSUE NECK W/DYE: CPT

## 2024-01-17 RX ADMIN — IOPAMIDOL 100 ML: 755 INJECTION, SOLUTION INTRAVENOUS at 12:25

## 2024-04-18 ENCOUNTER — HOSPITAL ENCOUNTER (OUTPATIENT)
Facility: HOSPITAL | Age: 69
Discharge: HOME | End: 2024-04-18
Attending: INTERNAL MEDICINE
Payer: MEDICARE

## 2024-04-18 DIAGNOSIS — C15.9 MALIGNANT NEOPLASM OF ESOPHAGUS, UNSPECIFIED LOCATION (HCC): ICD-10-CM

## 2024-04-18 DIAGNOSIS — K80.20 CALCULUS OF CYSTIC DUCT: ICD-10-CM

## 2024-04-18 LAB — CREAT BLD-MCNC: 0.6 MG/DL (ref 0.6–1.3)

## 2024-04-18 PROCEDURE — 6360000004 HC RX CONTRAST MEDICATION: Performed by: INTERNAL MEDICINE

## 2024-04-18 PROCEDURE — 74177 CT ABD & PELVIS W/CONTRAST: CPT

## 2024-04-18 PROCEDURE — 82565 ASSAY OF CREATININE: CPT

## 2024-04-18 RX ADMIN — IOPAMIDOL 100 ML: 755 INJECTION, SOLUTION INTRAVENOUS at 11:01

## 2024-06-04 ENCOUNTER — TRANSCRIBE ORDERS (OUTPATIENT)
Facility: HOSPITAL | Age: 69
End: 2024-06-04

## 2024-06-04 DIAGNOSIS — Z12.31 VISIT FOR SCREENING MAMMOGRAM: Primary | ICD-10-CM

## 2024-06-27 ENCOUNTER — HOSPITAL ENCOUNTER (OUTPATIENT)
Facility: HOSPITAL | Age: 69
Discharge: HOME | End: 2024-06-27
Attending: INTERNAL MEDICINE
Payer: MEDICARE

## 2024-06-27 VITALS — HEIGHT: 66 IN | WEIGHT: 161 LBS | BODY MASS INDEX: 25.88 KG/M2

## 2024-06-27 DIAGNOSIS — Z12.31 VISIT FOR SCREENING MAMMOGRAM: ICD-10-CM

## 2024-06-27 PROCEDURE — 77063 BREAST TOMOSYNTHESIS BI: CPT

## 2024-07-18 ENCOUNTER — HOSPITAL ENCOUNTER (OUTPATIENT)
Facility: HOSPITAL | Age: 69
Discharge: HOME | End: 2024-07-18
Attending: INTERNAL MEDICINE
Payer: MEDICARE

## 2024-07-18 VITALS — BODY MASS INDEX: 25.88 KG/M2 | WEIGHT: 161 LBS | HEIGHT: 66 IN

## 2024-07-18 DIAGNOSIS — Z78.0 ASYMPTOMATIC MENOPAUSAL STATE: ICD-10-CM

## 2024-07-18 PROCEDURE — 77080 DXA BONE DENSITY AXIAL: CPT

## 2024-10-03 ENCOUNTER — HOSPITAL ENCOUNTER (OUTPATIENT)
Facility: HOSPITAL | Age: 69
Discharge: HOME | End: 2024-10-06
Attending: INTERNAL MEDICINE
Payer: MEDICARE

## 2024-10-03 DIAGNOSIS — C15.8 MALIGNANT NEOPLASM OF OVERLAPPING SITES OF ESOPHAGUS (HCC): ICD-10-CM

## 2024-10-03 LAB — CREAT BLD-MCNC: 0.5 MG/DL (ref 0.6–1.3)

## 2024-10-03 PROCEDURE — 82565 ASSAY OF CREATININE: CPT

## 2024-10-03 PROCEDURE — 74177 CT ABD & PELVIS W/CONTRAST: CPT

## 2024-10-03 PROCEDURE — 70491 CT SOFT TISSUE NECK W/DYE: CPT

## 2024-10-03 PROCEDURE — 6360000004 HC RX CONTRAST MEDICATION: Performed by: INTERNAL MEDICINE

## 2024-10-03 RX ORDER — IOPAMIDOL 755 MG/ML
100 INJECTION, SOLUTION INTRAVASCULAR
Status: COMPLETED | OUTPATIENT
Start: 2024-10-03 | End: 2024-10-03

## 2024-10-03 RX ADMIN — IOPAMIDOL 100 ML: 755 INJECTION, SOLUTION INTRAVENOUS at 11:55

## 2024-11-01 ENCOUNTER — HOSPITAL ENCOUNTER (OUTPATIENT)
Facility: HOSPITAL | Age: 69
End: 2024-11-01
Payer: MEDICARE

## 2024-11-01 VITALS
BODY MASS INDEX: 28.5 KG/M2 | WEIGHT: 176.6 LBS | SYSTOLIC BLOOD PRESSURE: 151 MMHG | HEART RATE: 58 BPM | TEMPERATURE: 97.9 F | RESPIRATION RATE: 18 BRPM | OXYGEN SATURATION: 100 % | DIASTOLIC BLOOD PRESSURE: 79 MMHG

## 2024-11-01 DIAGNOSIS — C15.3 MALIGNANT NEOPLASM OF CERVICAL ESOPHAGUS (HCC): Primary | ICD-10-CM

## 2024-11-01 PROCEDURE — 99213 OFFICE O/P EST LOW 20 MIN: CPT

## 2024-11-01 RX ORDER — RALOXIFENE HYDROCHLORIDE 60 MG/1
60 TABLET, FILM COATED ORAL DAILY
COMMUNITY

## 2024-11-01 RX ORDER — LEVOTHYROXINE SODIUM 75 UG/1
75 TABLET ORAL DAILY
COMMUNITY

## 2024-11-01 ASSESSMENT — PAIN SCALES - GENERAL: PAINLEVEL_OUTOF10: 0

## 2024-11-01 NOTE — PROGRESS NOTES
[facility]    Radiation Oncology Follow Up    Princess Nagy  [id number]  1955     Diagnosis   Squamous cell ca cervical esophagus. [id number]    AJCC Staging has been reviewed.  Oncologic History   Defintiive chemo/xrt    Interval History   Ms. Nagy arrives today for routine follow up 2 years 1 month from end of treatment.    Doing well. Mild dysphagia. Weight up 15 pounds. Energy and activity good. No n/v. No other GI issues.    Recent Ct scan negative for recurrent or metastatic disease.      Review of Systems:  A complete review of systems was obtained and negative except as described above.    Interval Imaging/Labs     Above imaging/lab reports were reviewed.  Allergies and Medications   No Known Allergies    Current Outpatient Medications   Medication Instructions    atorvastatin (LIPITOR) 20 MG tablet Oral, DAILY    levothyroxine (SYNTHROID) 75 mcg, Oral, DAILY    lidocaine viscous hcl (XYLOCAINE) 2 % SOLN solution ceived the following from [facility]: Outside name: Lidocaine Viscous 2 % solution    lisinopril (PRINIVIL;ZESTRIL) 10 mg, Oral, DAILY    magnesium gluconate (MAGONATE) 27 mg, Oral, 3 TIMES DAILY    ondansetron (ZOFRAN-ODT) 4 MG disintegrating tablet TAKE 1 TABLET BY MOUTH THREE TIMES A DAY AS NEEDED FOR NAUSEA    pantoprazole sodium (PROTONIX) 40 mg, Nasogastric, DAILY    potassium chloride (KLOR-CON) 20 MEQ packet 20 mEq, Enteral, 2 TIMES DAILY WITH MEALS    raloxifene (EVISTA) 60 mg, Oral, DAILY    silver sulfADIAZINE (SILVADENE) 1 % cream APPLY TO AFFECTED AREA 3 TIMES A DAY        Physical Exam:   Vitals: Blood pressure (!) 151/79, pulse 58, temperature 97.9 °F (36.6 °C), temperature source Oral, resp. rate 18, weight 80.1 kg (176 lb 9.6 oz), SpO2 100%.   Performance Status: ECOG 1, No physically strenuous activity, but ambulatory and able to carry out light or sedentary work (eg office work, light house work)  Constitutional:

## 2025-01-10 ENCOUNTER — TRANSCRIBE ORDERS (OUTPATIENT)
Facility: HOSPITAL | Age: 70
End: 2025-01-10

## 2025-01-10 DIAGNOSIS — C15.9 MALIGNANT NEOPLASM OF ESOPHAGUS, UNSPECIFIED LOCATION (HCC): ICD-10-CM

## 2025-01-10 DIAGNOSIS — K80.20 CALCULUS OF GALLBLADDER WITHOUT CHOLECYSTITIS WITHOUT OBSTRUCTION: Primary | ICD-10-CM

## 2025-02-07 ENCOUNTER — HOSPITAL ENCOUNTER (OUTPATIENT)
Facility: HOSPITAL | Age: 70
Discharge: HOME | End: 2025-02-10
Attending: INTERNAL MEDICINE
Payer: MEDICARE

## 2025-02-07 DIAGNOSIS — C15.9 ADENOCARCINOMA OF ESOPHAGUS (HCC): ICD-10-CM

## 2025-02-07 DIAGNOSIS — K80.20 CALCULUS OF CYSTIC DUCT: ICD-10-CM

## 2025-02-07 PROCEDURE — 36590 REMOVAL TUNNELED CV CATH: CPT

## 2025-03-06 ENCOUNTER — HOSPITAL ENCOUNTER (OUTPATIENT)
Facility: HOSPITAL | Age: 70
Discharge: HOME | End: 2025-03-09
Attending: INTERNAL MEDICINE
Payer: MEDICARE

## 2025-03-06 DIAGNOSIS — C15.9 MALIGNANT NEOPLASM OF ESOPHAGUS, UNSPECIFIED LOCATION (HCC): ICD-10-CM

## 2025-03-06 DIAGNOSIS — K80.20 CALCULUS OF GALLBLADDER WITHOUT CHOLECYSTITIS WITHOUT OBSTRUCTION: ICD-10-CM

## 2025-03-06 LAB — CREAT BLD-MCNC: 0.7 MG/DL (ref 0.6–1.3)

## 2025-03-06 PROCEDURE — 74177 CT ABD & PELVIS W/CONTRAST: CPT

## 2025-03-06 PROCEDURE — 70491 CT SOFT TISSUE NECK W/DYE: CPT

## 2025-03-06 PROCEDURE — 6360000004 HC RX CONTRAST MEDICATION: Performed by: INTERNAL MEDICINE

## 2025-03-06 PROCEDURE — 82565 ASSAY OF CREATININE: CPT

## 2025-03-06 RX ORDER — IOPAMIDOL 755 MG/ML
100 INJECTION, SOLUTION INTRAVASCULAR
Status: COMPLETED | OUTPATIENT
Start: 2025-03-06 | End: 2025-03-06

## 2025-03-06 RX ADMIN — IOPAMIDOL 100 ML: 755 INJECTION, SOLUTION INTRAVENOUS at 10:36

## 2025-05-14 ENCOUNTER — TRANSCRIBE ORDERS (OUTPATIENT)
Facility: HOSPITAL | Age: 70
End: 2025-05-14

## 2025-05-14 DIAGNOSIS — K80.20 CALCULUS OF GALLBLADDER WITHOUT CHOLECYSTITIS WITHOUT OBSTRUCTION: Primary | ICD-10-CM

## 2025-05-14 DIAGNOSIS — C15.9 MALIGNANT NEOPLASM OF ESOPHAGUS, UNSPECIFIED LOCATION (HCC): ICD-10-CM

## 2025-06-26 ENCOUNTER — HOSPITAL ENCOUNTER (OUTPATIENT)
Facility: HOSPITAL | Age: 70
Discharge: HOME | End: 2025-06-29
Attending: INTERNAL MEDICINE
Payer: MEDICARE

## 2025-06-26 DIAGNOSIS — C15.9 MALIGNANT NEOPLASM OF ESOPHAGUS, UNSPECIFIED LOCATION (HCC): ICD-10-CM

## 2025-06-26 DIAGNOSIS — K80.20 CALCULUS OF GALLBLADDER WITHOUT CHOLECYSTITIS WITHOUT OBSTRUCTION: ICD-10-CM

## 2025-06-26 LAB — CREAT BLD-MCNC: 0.7 MG/DL (ref 0.6–1.3)

## 2025-06-26 PROCEDURE — 71260 CT THORAX DX C+: CPT

## 2025-06-26 PROCEDURE — 70491 CT SOFT TISSUE NECK W/DYE: CPT

## 2025-06-26 PROCEDURE — 6360000004 HC RX CONTRAST MEDICATION: Performed by: INTERNAL MEDICINE

## 2025-06-26 PROCEDURE — 82565 ASSAY OF CREATININE: CPT

## 2025-06-26 RX ORDER — IOPAMIDOL 755 MG/ML
100 INJECTION, SOLUTION INTRAVASCULAR
Status: DISCONTINUED | OUTPATIENT
Start: 2025-06-26 | End: 2025-06-30 | Stop reason: HOSPADM

## 2025-06-26 RX ORDER — IOPAMIDOL 755 MG/ML
100 INJECTION, SOLUTION INTRAVASCULAR
Status: COMPLETED | OUTPATIENT
Start: 2025-06-26 | End: 2025-06-26

## 2025-06-26 RX ADMIN — IOPAMIDOL 100 ML: 755 INJECTION, SOLUTION INTRAVENOUS at 10:39

## 2025-06-30 ENCOUNTER — HOSPITAL ENCOUNTER (OUTPATIENT)
Facility: HOSPITAL | Age: 70
Discharge: HOME | End: 2025-07-03
Attending: INTERNAL MEDICINE
Payer: MEDICARE

## 2025-06-30 DIAGNOSIS — Z12.31 ENCOUNTER FOR SCREENING MAMMOGRAM FOR MALIGNANT NEOPLASM OF BREAST: ICD-10-CM

## 2025-06-30 PROCEDURE — 77063 BREAST TOMOSYNTHESIS BI: CPT

## 2025-08-22 ENCOUNTER — TRANSCRIBE ORDERS (OUTPATIENT)
Facility: HOSPITAL | Age: 70
End: 2025-08-22

## 2025-08-22 DIAGNOSIS — K80.80 BILIARY CALCULUS OF OTHER SITE WITHOUT OBSTRUCTION: Primary | ICD-10-CM

## 2025-08-22 DIAGNOSIS — C15.9 MALIGNANT NEOPLASM OF ESOPHAGUS, UNSPECIFIED LOCATION (HCC): ICD-10-CM

## (undated) DEVICE — MOUTHPIECE ENDOSCP 20X27MM --

## (undated) DEVICE — THE ENDO CARRY-ON PROCEDURE KIT CONTAINS ALL OF THE SUPPLIES AND INFECTION PREVENTION PRODUCTS NEEDED FOR ENDOSCOPIC PROCEDURES: Brand: ENDO CARRY-ON PROCEDURE KIT

## (undated) DEVICE — YANKAUER,BULB TIP,W/O VENT,RIGID,STERILE: Brand: MEDLINE

## (undated) DEVICE — ESOPHAGEAL BALLOON DILATATION CATHETER: Brand: CRE FIXED WIRE

## (undated) DEVICE — DEVICE INFL 60ML 12ATM CONVENIENT LOK REL HNDL HI PRSS FLX